# Patient Record
Sex: MALE | Race: WHITE | NOT HISPANIC OR LATINO | Employment: OTHER | URBAN - NONMETROPOLITAN AREA
[De-identification: names, ages, dates, MRNs, and addresses within clinical notes are randomized per-mention and may not be internally consistent; named-entity substitution may affect disease eponyms.]

---

## 2017-09-11 ENCOUNTER — AMBULATORY - GICH (OUTPATIENT)
Dept: ORTHOPEDICS | Facility: OTHER | Age: 67
End: 2017-09-11

## 2017-09-11 DIAGNOSIS — M25.552 PAIN IN LEFT HIP: ICD-10-CM

## 2017-09-11 DIAGNOSIS — M25.551 PAIN IN RIGHT HIP: ICD-10-CM

## 2017-09-12 ENCOUNTER — AMBULATORY - GICH (OUTPATIENT)
Dept: ORTHOPEDICS | Facility: OTHER | Age: 67
End: 2017-09-12

## 2017-09-12 ENCOUNTER — OFFICE VISIT - GICH (OUTPATIENT)
Dept: ORTHOPEDICS | Facility: OTHER | Age: 67
End: 2017-09-12

## 2017-09-12 ENCOUNTER — HOSPITAL ENCOUNTER (OUTPATIENT)
Dept: RADIOLOGY | Facility: OTHER | Age: 67
End: 2017-09-12
Attending: ORTHOPAEDIC SURGERY

## 2017-09-12 ENCOUNTER — HISTORY (OUTPATIENT)
Dept: ORTHOPEDICS | Facility: OTHER | Age: 67
End: 2017-09-12

## 2017-09-12 DIAGNOSIS — M25.552 PAIN IN LEFT HIP: ICD-10-CM

## 2017-09-12 DIAGNOSIS — M25.551 PAIN IN RIGHT HIP: ICD-10-CM

## 2017-09-12 DIAGNOSIS — M70.62 TROCHANTERIC BURSITIS OF LEFT HIP: ICD-10-CM

## 2017-09-12 DIAGNOSIS — M70.61 TROCHANTERIC BURSITIS OF RIGHT HIP: ICD-10-CM

## 2017-09-12 DIAGNOSIS — M16.0 PRIMARY OSTEOARTHRITIS OF BOTH HIPS: ICD-10-CM

## 2017-12-28 NOTE — PROGRESS NOTES
Patient Information     Patient Name MRN Sex Antonio Adamson [id number] Male 1950      Progress Notes by Pascual Faustin DO at 2017 12:45 PM     Author:  Pascual Faustin DO Service:  (none) Author Type:  PHYS- Osteopathic     Filed:  2017  2:58 PM Encounter Date:  2017 Status:  Signed     :  Pascual Faustin DO (PHYS- Osteopathic)            Antonio Rutherford was seen for a chief complaint of bilateral hip pain.    CHIEF COMPLAINT: Antonio Rutherford is a 67 y.o.  male  Chief Complaint     Patient presents with       Consult      Bilateral hip pain       HISTORY OF PRESENTING INJURY:  67-year-old male relates a history of chronic 15+ years of bilateral hip pain. When questioned, the patient describes pain to the outside, lateral aspect of both hips. The right is more painful than the left. No complaint of anterior hip or groin pain. Relates he was seen a couple of years ago and was diagnosed with bursitis of the hip. The patient tried taking Aleve but it cause some problems where he did not feel normal after taking it so he does not want to take Aleve again.  Walks with a limp because of pain on the side of both hips. He often grabs onto objects.  No formal physical therapy.  No prior cortisone injections that he remembers.  Patient used to stand for hours at work as a .    REVIEW OF SYSTEMS:  Constitutional:  Denies constitutional problems  Cardiovascular: normal  Respiratory: normal    The review of systems as documented in the HPI and on the intake questionnaire, completed by the patient 2017, have been reviewed by myself and the pertinent positives and negatives addressed.  The remainder of the complete review of systems was non-contributory.    (PFSH) PAST, FAMILY, and/or SOCIAL HISTORY:    PAST MEDICAL HISTORY:  No past medical history on file.    PAST SURGICAL HISTORY:  No past surgical history on file.    ALLERGIES:  No Active Allergies    CURRENT  "MEDICATIONS:  No current outpatient prescriptions on file.     No current facility-administered medications for this visit.      Medications have been reviewed by me and are current to the best of my knowledge and ability.      FAMILY HISTORY:  Family History       Problem   Relation Age of Onset     Other  Sister      b12 deficiency         Additional PFSH information documented on the intake form completed by the patient 9/12/2017 was reviewed by myself.    PHYSICAL EXAM:   /78  Pulse 72  Ht 1.778 m (5' 10\")  Wt 99.8 kg (220 lb)  BMI 31.57 kg/m2 Body mass index is 31.57 kg/(m^2).    General Appearance: Pleasant male in good appearance, mood and affect.  Alert and orientated times three ( time, date and location).    Musculoskeletal Exam:    Standing, seated, supine exam:  Leg lengths: Equal  Ambulates with a limp: yes  standing exam demonstrates mild weakness of both hip abductors with single leg stand.    bilateral  Hip:  Skin: rashes or lesions: No   Palpation: moderately tender, along the side of both hips over the greater trochanters and trochanteric bursa with palpation. Reproduces symptoms.  No pain with passive motion of the hip joints while seated or supine. No anterior hip or groin pain elicited.  ROM: Passive extension: 0 degrees  Flexion: 90+ degrees  Abduction: 40+ while supine degrees  Adduction: 10-15+ while supine degrees  Internal rotation while seated: 20-30 degrees  External rotation while seated: 40+ degrees  Special maneuvers:         log roll testing painful: no   Strength:   5 / 5  Neurological / Vascular:  sensation grossly intact and distal pulses normal at 2+    XRAY/MRI/MRA:  Radiographic images where independently read by myself and discussed with the patient:  Attending Doctor: JOSE ALVAREZ ([id number])  :       LAKEISHA GREENE ([id number])  Report Date:       09/12/2017 13:58:13  Report Status:       Final  ======================= Begin of Report Content " ======================    PROCEDURE: XR HIP 2 VIEWS WO PELVIS LEFT  HISTORY: Bilateral hip pain.  COMPARISON: None.  TECHNIQUE: 2 views of the left hip were obtained.  FINDINGS: No fracture or dislocation is identified. There are mild to moderate degenerative changes of the left hip joint. There are mild sacroiliac joint degenerative changes.  IMPRESSION: Moderate degenerative disease. No acute fracture.  Electronically Signed By: Lakeisha Greene M.D. on 9/12/2017 1:54 PM    Attending Doctor: JOSE ALVAREZ ([id number])  :       LAKEISHA GREENE ([id number])  Report Date:       09/12/2017 13:57:38  Report Status:       Final  ======================= Begin of Report Content ======================    Procedure: XR HIP 2 OR 3 VIEWS W PELVIS RIGHT  HISTORY: Bilateral hip pain.  TECHNIQUE: Pelvis one view and 2 views right hip.  COMPARISON: None.  FINDINGS:  No acute fracture or dislocation. There are moderate degenerative changes of both hips, right greater than left. There are also mild degenerative changes in the sacroiliac joints bilaterally.  IMPRESSION: Moderate degenerative disease.  Electronically Signed By: Lakeisha Greene M.D. on 9/12/2017 1:53 PM    ASSESSMENT:  Right hip trochanteric tendinitis/bursitis, chronic  left hip trochanteric tendinitis/bursitis, chronic  bilateral hip osteoarthritis on x-ray. Moderate degenerative changes. Joint space remains. No bone-on-bone  symptoms and findings appear to be extra-articular over the side of both hip joints.  Mild bilateral hip abductor weakness    PLAN:  Discussion included review of x-rays. Discussed with the patient he does have arthritis or narrowing of both hip joint spaces on x-ray. He is not bone-on-bone.  Clinically he has good passive motion of both hip joints. Symptoms appear to be extra-articular over the side of the hips.    Recommendation for nonsurgical management and treatment of trochanteric bursitis/tendinitis.    Risks, benefits, conservative,  surgical, and alternatives of treatment were thoroughly outlined. No guarantees were given.   He did verbalize an understanding. All questions and concerns were addressed.    PROCEDURE:  After written and oral informed consent was received from the patient having listed the risks that do include pain, nerve damage, injury to the tendons ,damage to vascular structures and infection but not limited to these; the patients right trochanteric region was sterilely prepped and draped after a timeout was performed. Utilizing ethyl chloride the area was cooled.  With sterile technique a 22 gauge needle was introduced into the trochanteric bursa region and 8 cc of lidocaine and 2 cc of Celestone was injected.  A sterile bandage was applied and the patient tolerated the procedure well.  He was given a handout about injections to take home.     PROCEDURE:  After written and oral informed consent was received from the patient having listed the risks that do include pain, nerve damage, injury to the tendons ,damage to vascular structures and infection but not limited to these; the patients left trochanteric region was sterilely prepped and draped after a timeout was performed. Utilizing ethyl chloride the area was cooled.  With sterile technique a 22 gauge needle was introduced into the trochanteric bursa region and 8 cc of lidocaine and 2 cc of Celestone was injected.  A sterile bandage was applied and the patient tolerated the procedure well.     Recommended physical therapy referral but the patient declined at this point. He will call back if interested.  Questions answered.  Discussed the use of ice to the area.  Discussed the use of Tylenol or ibuprofen. Caution with use.    Pascual Faustin D.O., F.A.O.A.O.  Orthopedic Surgeon    [facility] and [address] [address]  Phone [phone]  Fax [phone]    1:34 PM 9/12/2017

## 2017-12-30 NOTE — NURSING NOTE
Patient Information     Patient Name MRN Sex Antonio Adamson [id number] Male 1950      Nursing Note by Gosselin, Norma J at 2017 12:45 PM     Author:  Gosselin, Norma J  Service:  (none) Author Type:  (none)     Filed:  2017  2:26 PM  Encounter Date:  2017 Status:  Addendum     :  Gosselin, Norma J        Related Notes: Original Note by Gosselin, Norma J filed at 2017 12:45 PM            Consult bilateral hip pain.  Norma J Gosselin LPN....................  2017   12:44 PM    Universal Protocol    A. Pre-procedure verification complete yes  1-relevant information / documentation available, reviewed and properly matched to the patient; 2-consent accurate and complete, 3-equipment and supplies available    B. Site marking complete N/A  Site marked if not in continuous attendance with patient    C. TIME OUT completed yes  Time Out was conducted just prior to starting procedure to verify the eight required elements: 1-patient identity, 2-consent accurate and complete, 3-position, 4-correct side/site marked (if applicable), 5-procedure, 6-relevant images / results properly labeled and displayed (if applicable), 7-antibiotics / irrigation fluids (if applicable), 8-safety precautions.

## 2018-01-26 VITALS
DIASTOLIC BLOOD PRESSURE: 78 MMHG | SYSTOLIC BLOOD PRESSURE: 148 MMHG | WEIGHT: 220 LBS | HEIGHT: 70 IN | HEART RATE: 72 BPM | BODY MASS INDEX: 31.5 KG/M2

## 2018-02-13 ENCOUNTER — DOCUMENTATION ONLY (OUTPATIENT)
Dept: FAMILY MEDICINE | Facility: OTHER | Age: 68
End: 2018-02-13

## 2019-03-05 ENCOUNTER — OFFICE VISIT (OUTPATIENT)
Dept: OTOLARYNGOLOGY | Facility: OTHER | Age: 69
End: 2019-03-05
Attending: OTOLARYNGOLOGY
Payer: MEDICARE

## 2019-03-05 DIAGNOSIS — R13.19 ESOPHAGEAL DYSPHAGIA: Primary | ICD-10-CM

## 2019-03-05 PROCEDURE — G0463 HOSPITAL OUTPT CLINIC VISIT: HCPCS

## 2019-03-08 NOTE — PROGRESS NOTES
ETELVINAALEJANDRA CANALES KIEL    69 Y old Male, : 1950    Account Number: [id number]    [address] [address]    Home: [phone]     Guarantor: ALEJANDRA MAIN Insurance: [facility] MEDICARE B Payer ID: [id number]   PCP: Dr Pascual Faustin   Appointment Facility: [facility]      2019 Progress Notes: Desmond Burnett MD       Reason for Appointment   1. THROAT ISSUES/FOOD GETS STUCK   2. Dysphagia   History of Present Illness   HPI:   The patient is a 69-year-old gentleman who comes to the office today for assistance with dysphagia. He states he has had difficulties for many years but has had substantial progression of his problems over the last year 2. He has gotten to the point were he really can't swallow meets and tougher foods. When he has he has gotten obstructed and feels material stick substernally. He has spent up to a couple of hours before eventually getting material the past or clear from above. He denies hematemesis hemoptysis, odynophagia, otalgia, voice change.   Examination   General Examination:  Oral cavity oropharynx-free of mucosal lesions or inflammation   Indirect laryngoscopy-unremarkable   Neck-no palpable masses or adenopathy   Nasal-no obstruction or purulence   Neck integument-Clear   General-the patient appears well and in no distress   Neuro-there are no focal cranial nerve deficits.     Assessments   1. Esophageal dysphagia - R13.10 (Primary)   Treatment   1. Others   Notes: The patient's symptoms seem substernal and and it would seem reasonable to send him on for upper GI endoscopy. We will make arrangements with Dr. Cancino of General surgery here in [place] for further workup.  Procedures  [ ].          Follow Up   prn               Electronically signed by DESMOND BURNETT MD on 2019 at 08:59 AM CST   Sign off status: Completed          [facility]  [address] [place] [address]  Tel: [phone]  Fax:            Patient: ALJEANDRA MAIN : 1950 Progress Note: Desmond Burnett MD 2019      Note generated by [employer] EMR/PM Software ([email or web address])

## 2019-03-12 ENCOUNTER — OFFICE VISIT (OUTPATIENT)
Dept: SURGERY | Facility: OTHER | Age: 69
End: 2019-03-12
Payer: MEDICARE

## 2019-03-12 ENCOUNTER — TELEPHONE (OUTPATIENT)
Dept: SURGERY | Facility: OTHER | Age: 69
End: 2019-03-12

## 2019-03-12 VITALS
RESPIRATION RATE: 20 BRPM | BODY MASS INDEX: 34.07 KG/M2 | WEIGHT: 230 LBS | TEMPERATURE: 98.3 F | DIASTOLIC BLOOD PRESSURE: 86 MMHG | SYSTOLIC BLOOD PRESSURE: 186 MMHG | HEART RATE: 88 BPM | HEIGHT: 69 IN

## 2019-03-12 DIAGNOSIS — R13.10 DYSPHAGIA, UNSPECIFIED TYPE: Primary | ICD-10-CM

## 2019-03-12 PROCEDURE — 99204 OFFICE O/P NEW MOD 45 MIN: CPT | Performed by: SURGERY

## 2019-03-12 PROCEDURE — G0463 HOSPITAL OUTPT CLINIC VISIT: HCPCS

## 2019-03-12 ASSESSMENT — PAIN SCALES - GENERAL: PAINLEVEL: MILD PAIN (2)

## 2019-03-12 ASSESSMENT — MIFFLIN-ST. JEOR: SCORE: 1788.65

## 2019-03-12 NOTE — H&P (VIEW-ONLY)
Patient Name: Antonio Rutherford  Address: [address]  [facility] [address]  Age:69 year old  Sex: male     Primary Care Physician: none    HPI:   The patient is 69 year old male with complaints of food sticking. The swallowing problems have been present for more than 20 years but are getting worse. He had a XR study more than 10 years ago but hasn't had an EGD. He hasn't had weight loss. The patient denies nausea, vomiting, constipation and diarrhea. He is a smoker of at least a pack, probably 1 1/2 packs a day since he was 4 years old. He reports his sister has had dilation of her esophagus 3 times.     CONSULTATION ASSESSMENT AND PLAN/RECOMMENDATIONS:   I explained to the patient the risks, benefits and alternatives to diagnostic EGD for evaluating his complaint. We specifically discussed the risks of bleeding, infection, perforation, and the risks of sedation. Risk factors include obesity, tobacco use and daily alcohol use. The patient's questions were answered and the patient wished to proceed. This will be scheduled at a time that is convenient for the patient.    REVIEW OF SYSTEMS  GENERAL: No fevers or chills. Denies fatigue, recent weight loss.  HEENT: No sinus drainage. No changes with vision or hearing.  LYMPHATICS:  No swollen nodes in axilla, neck or groin.  CARDIOVASCULAR: Denies chest pain, palpitations and dyspnea on exertion.  PULMONARY: No shortness of breath or cough. No increase in sputum production.  GI: Denies melena, bright red blood in stools. No hematemesis. No constipation or diarrhea.  : No dysuria or hematuria.  SKIN: No recent rashes or ulcers.   HEMATOLOGY:  No history of easy bruising or bleeding.  ENDOCRINE:  No history of diabetes or thyroid problems.  NEUROLOGY:  No history of seizures or headaches. No motor or sensory changes.  Past Medical History:   Diagnosis Date     Tobacco dependence      History reviewed. No pertinent surgical history.  No current outpatient medications on  "file.     No current facility-administered medications for this visit.      Allergies   Allergen Reactions     Naproxen Unknown     Family History   Problem Relation Age of Onset     Other - See Comments Sister         b12 deficiency     Social History     Socioeconomic History     Marital status:      Spouse name: None     Number of children: None     Years of education: None     Highest education level: None   Occupational History     None   Social Needs     Financial resource strain: None     Food insecurity:     Worry: None     Inability: None     Transportation needs:     Medical: None     Non-medical: None   Tobacco Use     Smoking status: Current Every Day Smoker     Packs/day: 1.50     Years: 62.00     Pack years: 93.00     Types: Cigarettes     Smokeless tobacco: Never Used     Tobacco comment: has cut back his smoking   Substance and Sexual Activity     Alcohol use: Yes     Comment: Alcoholic Drinks/day: \"way too much\" 1 L per week     Drug use: Unknown     Types: Other     Comment: Drug use: Not Asked     Sexual activity: None   Lifestyle     Physical activity:     Days per week: None     Minutes per session: None     Stress: None   Relationships     Social connections:     Talks on phone: None     Gets together: None     Attends Evangelical service: None     Active member of club or organization: None     Attends meetings of clubs or organizations: None     Relationship status: None     Intimate partner violence:     Fear of current or ex partner: None     Emotionally abused: None     Physically abused: None     Forced sexual activity: None   Other Topics Concern     None   Social History Narrative    Has worked as a , now retired.      The above history was reviewed today 3/12/2019    PHYSICAL EXAM  Vitals: /86 (BP Location: Right arm, Patient Position: Sitting, Cuff Size: Adult Large)   Pulse 88   Temp 98.3  F (36.8  C) (Tympanic)   Resp 20   Ht 1.753 m (5' 9\")   Wt " 104.3 kg (230 lb)   BMI 33.97 kg/m    GENERAL: patient in no acute distress. Pleasant and cooperative with exam and interview.   HEENT:Head-normocephalic. Eyes-no scleral icterus. Nose-no nasal drainage. No lesions. Mouth-oral mucosa pink and moist, no lesions.  NECK: Supple. No thyroid nodules. Trachea midline.  LYMPHATICS:  No cervical, axillary or supraclavicular adenopathy.  CV: Regular rate and rhythm, no murmurs. No peripheral edema.  LUNGS:  No respiratory distress. Scattered wheezes bilaterally to auscultation.  ABDOMEN: Non distended. Bowel sounds active. Soft, non-tender, no hepatosplenomegaly or umbilical hernia. No peritoneal signs.  SKIN: Pink, warm and dry. No jaundice. No rash.  NEURO:  Cranial nerves II-XII grossly intact. Alert and oriented.  PSYCH: Appropriate mood and affect.

## 2019-03-12 NOTE — TELEPHONE ENCOUNTER
Screening Questions for the Scheduling of Screening Colonoscopies   (If Colonoscopy is diagnostic, Provider should review the chart before scheduling.)  Are you younger than 50 or older than 80?  NO   Do you take aspirin or fish oil?  NO  (if yes, tell patient to stop 1 week prior to Colonoscopy)  Do you take warfarin (Coumadin), clopidogrel (Plavix), apixaban (Eliquis), dabigatram (Pradaxa), rivaroxaban (Xarelto) or any blood thinner? NO   Do you use oxygen at home?  NO   Do you have kidney disease? NO   Are you on dialysis? NO   Have you had a stroke or heart attack in the last year? NO   Have you had a stent in your heart or any blood vessel in the last year? NO   Have you had a transplant of any organ? NO   Have you had a colonoscopy or upper endoscopy (EGD) before? NO          When?    Date of scheduled EGD  - 03/20/2019  Provider  [place]

## 2019-03-12 NOTE — PROGRESS NOTES
Patient Name: Antonio Rutherford  Address: [address]  [facility] [address]  Age:69 year old  Sex: male     Primary Care Physician: none    HPI:   The patient is 69 year old male with complaints of food sticking. The swallowing problems have been present for more than 20 years but are getting worse. He had a XR study more than 10 years ago but hasn't had an EGD. He hasn't had weight loss. The patient denies nausea, vomiting, constipation and diarrhea. He is a smoker of at least a pack, probably 1 1/2 packs a day since he was 4 years old. He reports his sister has had dilation of her esophagus 3 times.     CONSULTATION ASSESSMENT AND PLAN/RECOMMENDATIONS:   I explained to the patient the risks, benefits and alternatives to diagnostic EGD for evaluating his complaint. We specifically discussed the risks of bleeding, infection, perforation, and the risks of sedation. Risk factors include obesity, tobacco use and daily alcohol use. The patient's questions were answered and the patient wished to proceed. This will be scheduled at a time that is convenient for the patient.    REVIEW OF SYSTEMS  GENERAL: No fevers or chills. Denies fatigue, recent weight loss.  HEENT: No sinus drainage. No changes with vision or hearing.  LYMPHATICS:  No swollen nodes in axilla, neck or groin.  CARDIOVASCULAR: Denies chest pain, palpitations and dyspnea on exertion.  PULMONARY: No shortness of breath or cough. No increase in sputum production.  GI: Denies melena, bright red blood in stools. No hematemesis. No constipation or diarrhea.  : No dysuria or hematuria.  SKIN: No recent rashes or ulcers.   HEMATOLOGY:  No history of easy bruising or bleeding.  ENDOCRINE:  No history of diabetes or thyroid problems.  NEUROLOGY:  No history of seizures or headaches. No motor or sensory changes.  Past Medical History:   Diagnosis Date     Tobacco dependence      History reviewed. No pertinent surgical history.  No current outpatient medications on  "file.     No current facility-administered medications for this visit.      Allergies   Allergen Reactions     Naproxen Unknown     Family History   Problem Relation Age of Onset     Other - See Comments Sister         b12 deficiency     Social History     Socioeconomic History     Marital status:      Spouse name: None     Number of children: None     Years of education: None     Highest education level: None   Occupational History     None   Social Needs     Financial resource strain: None     Food insecurity:     Worry: None     Inability: None     Transportation needs:     Medical: None     Non-medical: None   Tobacco Use     Smoking status: Current Every Day Smoker     Packs/day: 1.50     Years: 62.00     Pack years: 93.00     Types: Cigarettes     Smokeless tobacco: Never Used     Tobacco comment: has cut back his smoking   Substance and Sexual Activity     Alcohol use: Yes     Comment: Alcoholic Drinks/day: \"way too much\" 1 L per week     Drug use: Unknown     Types: Other     Comment: Drug use: Not Asked     Sexual activity: None   Lifestyle     Physical activity:     Days per week: None     Minutes per session: None     Stress: None   Relationships     Social connections:     Talks on phone: None     Gets together: None     Attends Restorationism service: None     Active member of club or organization: None     Attends meetings of clubs or organizations: None     Relationship status: None     Intimate partner violence:     Fear of current or ex partner: None     Emotionally abused: None     Physically abused: None     Forced sexual activity: None   Other Topics Concern     None   Social History Narrative    Has worked as a , now retired.      The above history was reviewed today 3/12/2019    PHYSICAL EXAM  Vitals: /86 (BP Location: Right arm, Patient Position: Sitting, Cuff Size: Adult Large)   Pulse 88   Temp 98.3  F (36.8  C) (Tympanic)   Resp 20   Ht 1.753 m (5' 9\")   Wt " 104.3 kg (230 lb)   BMI 33.97 kg/m    GENERAL: patient in no acute distress. Pleasant and cooperative with exam and interview.   HEENT:Head-normocephalic. Eyes-no scleral icterus. Nose-no nasal drainage. No lesions. Mouth-oral mucosa pink and moist, no lesions.  NECK: Supple. No thyroid nodules. Trachea midline.  LYMPHATICS:  No cervical, axillary or supraclavicular adenopathy.  CV: Regular rate and rhythm, no murmurs. No peripheral edema.  LUNGS:  No respiratory distress. Scattered wheezes bilaterally to auscultation.  ABDOMEN: Non distended. Bowel sounds active. Soft, non-tender, no hepatosplenomegaly or umbilical hernia. No peritoneal signs.  SKIN: Pink, warm and dry. No jaundice. No rash.  NEURO:  Cranial nerves II-XII grossly intact. Alert and oriented.  PSYCH: Appropriate mood and affect.

## 2019-03-12 NOTE — NURSING NOTE
"Chief Complaint   Patient presents with     Consult     Esophageal dysphagia       Initial /86 (BP Location: Right arm, Patient Position: Sitting, Cuff Size: Adult Large)   Pulse 88   Temp 98.3  F (36.8  C) (Tympanic)   Resp 20   Ht 1.753 m (5' 9\")   Wt 104.3 kg (230 lb)   BMI 33.97 kg/m   Estimated body mass index is 33.97 kg/m  as calculated from the following:    Height as of this encounter: 1.753 m (5' 9\").    Weight as of this encounter: 104.3 kg (230 lb).  Medication Reconciliation: complete    Antonieta Salvador LPN    "

## 2019-03-20 ENCOUNTER — HOSPITAL ENCOUNTER (OUTPATIENT)
Facility: OTHER | Age: 69
Discharge: HOME | End: 2019-03-20
Attending: SURGERY | Admitting: SURGERY
Payer: MEDICARE

## 2019-03-20 ENCOUNTER — ANESTHESIA EVENT (OUTPATIENT)
Dept: SURGERY | Facility: OTHER | Age: 69
End: 2019-03-20
Payer: MEDICARE

## 2019-03-20 ENCOUNTER — ANESTHESIA (OUTPATIENT)
Dept: SURGERY | Facility: OTHER | Age: 69
End: 2019-03-20
Payer: MEDICARE

## 2019-03-20 VITALS
TEMPERATURE: 98.3 F | SYSTOLIC BLOOD PRESSURE: 186 MMHG | HEART RATE: 64 BPM | RESPIRATION RATE: 18 BRPM | OXYGEN SATURATION: 96 % | HEIGHT: 69 IN | DIASTOLIC BLOOD PRESSURE: 109 MMHG | WEIGHT: 230 LBS | BODY MASS INDEX: 34.07 KG/M2

## 2019-03-20 DIAGNOSIS — K29.80 DUODENITIS: ICD-10-CM

## 2019-03-20 DIAGNOSIS — K22.2 ESOPHAGEAL STRICTURE: Primary | ICD-10-CM

## 2019-03-20 DIAGNOSIS — K29.70 GASTRITIS DETERMINED BY ENDOSCOPY: ICD-10-CM

## 2019-03-20 PROCEDURE — 88305 TISSUE EXAM BY PATHOLOGIST: CPT

## 2019-03-20 PROCEDURE — 43239 EGD BIOPSY SINGLE/MULTIPLE: CPT | Mod: XU | Performed by: SURGERY

## 2019-03-20 PROCEDURE — 25800030 ZZH RX IP 258 OP 636: Performed by: SURGERY

## 2019-03-20 PROCEDURE — 43249 ESOPH EGD DILATION <30 MM: CPT | Performed by: SURGERY

## 2019-03-20 PROCEDURE — 25000125 ZZHC RX 250: Performed by: SURGERY

## 2019-03-20 PROCEDURE — 99100 ANES PT EXTEME AGE<1 YR&>70: CPT | Performed by: NURSE ANESTHETIST, CERTIFIED REGISTERED

## 2019-03-20 PROCEDURE — 25000128 H RX IP 250 OP 636: Performed by: NURSE ANESTHETIST, CERTIFIED REGISTERED

## 2019-03-20 PROCEDURE — 40000010 ZZH STATISTIC ANES STAT CODE-CRNA PER MINUTE: Performed by: SURGERY

## 2019-03-20 PROCEDURE — 25000125 ZZHC RX 250: Performed by: NURSE ANESTHETIST, CERTIFIED REGISTERED

## 2019-03-20 PROCEDURE — 43239 EGD BIOPSY SINGLE/MULTIPLE: CPT | Performed by: NURSE ANESTHETIST, CERTIFIED REGISTERED

## 2019-03-20 PROCEDURE — 43249 ESOPH EGD DILATION <30 MM: CPT

## 2019-03-20 RX ORDER — SODIUM CHLORIDE, SODIUM LACTATE, POTASSIUM CHLORIDE, CALCIUM CHLORIDE 600; 310; 30; 20 MG/100ML; MG/100ML; MG/100ML; MG/100ML
INJECTION, SOLUTION INTRAVENOUS CONTINUOUS
Status: DISCONTINUED | OUTPATIENT
Start: 2019-03-20 | End: 2019-03-20 | Stop reason: HOSPADM

## 2019-03-20 RX ORDER — LIDOCAINE 40 MG/G
CREAM TOPICAL
Status: DISCONTINUED | OUTPATIENT
Start: 2019-03-20 | End: 2019-03-20 | Stop reason: HOSPADM

## 2019-03-20 RX ORDER — PROPOFOL 10 MG/ML
INJECTION, EMULSION INTRAVENOUS CONTINUOUS PRN
Status: DISCONTINUED | OUTPATIENT
Start: 2019-03-20 | End: 2019-03-20

## 2019-03-20 RX ORDER — ONDANSETRON 4 MG/1
4 TABLET, ORALLY DISINTEGRATING ORAL EVERY 6 HOURS PRN
Status: CANCELLED | OUTPATIENT
Start: 2019-03-20

## 2019-03-20 RX ORDER — ONDANSETRON 2 MG/ML
4 INJECTION INTRAMUSCULAR; INTRAVENOUS
Status: DISCONTINUED | OUTPATIENT
Start: 2019-03-20 | End: 2019-03-20 | Stop reason: HOSPADM

## 2019-03-20 RX ORDER — SUCRALFATE 1 G/1
1 TABLET ORAL 3 TIMES DAILY
Qty: 90 TABLET | Refills: 3 | Status: SHIPPED | OUTPATIENT
Start: 2019-03-20 | End: 2019-04-18

## 2019-03-20 RX ORDER — FLUMAZENIL 0.1 MG/ML
0.2 INJECTION, SOLUTION INTRAVENOUS
Status: CANCELLED | OUTPATIENT
Start: 2019-03-20 | End: 2019-03-20

## 2019-03-20 RX ORDER — ONDANSETRON 2 MG/ML
4 INJECTION INTRAMUSCULAR; INTRAVENOUS EVERY 6 HOURS PRN
Status: CANCELLED | OUTPATIENT
Start: 2019-03-20

## 2019-03-20 RX ORDER — NALOXONE HYDROCHLORIDE 0.4 MG/ML
.1-.4 INJECTION, SOLUTION INTRAMUSCULAR; INTRAVENOUS; SUBCUTANEOUS
Status: CANCELLED | OUTPATIENT
Start: 2019-03-20 | End: 2019-03-21

## 2019-03-20 RX ORDER — LIDOCAINE HYDROCHLORIDE 20 MG/ML
INJECTION, SOLUTION INFILTRATION; PERINEURAL PRN
Status: DISCONTINUED | OUTPATIENT
Start: 2019-03-20 | End: 2019-03-20

## 2019-03-20 RX ORDER — PROPOFOL 10 MG/ML
INJECTION, EMULSION INTRAVENOUS PRN
Status: DISCONTINUED | OUTPATIENT
Start: 2019-03-20 | End: 2019-03-20

## 2019-03-20 RX ADMIN — LIDOCAINE HYDROCHLORIDE 40 MG: 20 INJECTION, SOLUTION INFILTRATION; PERINEURAL at 09:52

## 2019-03-20 RX ADMIN — PROPOFOL 80 MG: 10 INJECTION, EMULSION INTRAVENOUS at 09:52

## 2019-03-20 RX ADMIN — SODIUM CHLORIDE, POTASSIUM CHLORIDE, SODIUM LACTATE AND CALCIUM CHLORIDE: 600; 310; 30; 20 INJECTION, SOLUTION INTRAVENOUS at 09:46

## 2019-03-20 RX ADMIN — PROPOFOL 160 MCG/KG/MIN: 10 INJECTION, EMULSION INTRAVENOUS at 09:52

## 2019-03-20 ASSESSMENT — MIFFLIN-ST. JEOR: SCORE: 1788.65

## 2019-03-20 ASSESSMENT — LIFESTYLE VARIABLES: TOBACCO_USE: 1

## 2019-03-20 NOTE — DISCHARGE INSTRUCTIONS
Procedure you had done: EGD with biopsies and dilation  Your health care provider is:  Pascual Faustin  Your surgeon is Dr. Izzy Cancino.   Please call your health care provider or surgeon at [phone] if:    - you feel you are getting worse or having an increase in problems    - fever greater than 101 degrees  - increasing shortness of breath or chest pain  - any signs of infection (increasing redness, swelling, tenderness, warmth, change in appearance, or  increased drainage)  - blood in your urine or stool  - coughing or vomiting blood  - nausea (upset stomach) and vomiting and/or diarrhea that will not stop  - severe pain that is not relieved by medicine, rest or ice  You have had medications for sedation. Please be aware that this can cause drowsiness and impaired judgment for up to 24 hours after your procedure. Do not drive, operate power tools or drink alcohol for 24 hours.  If samples were taken-you will get a phone call and a letter with your results in the next 7-10 days. If you don't get results, please call and let us know!       [facility]  Adult Discharge Orders & Instructions    ________________________________________________________________          For 12 hours after surgery  1. Get plenty of rest.  A responsible adult must stay with you for at least 24 hours after you leave the hospital.   2. You may feel lightheaded.  IF so, sit for a few minutes before standing.  Have someone help you get up.   3. You may have a slight fever. Call the doctor if your fever is over 101 F (38.3 C) (taken under the tongue) or lasts longer than 24 hours.  4. You may have a dry mouth, a sore throat, muscle aches or trouble sleeping.  These should go away after 24 hours.  5. Do not make important or legal decisions.      To contact a doctor, call   [phone]_______________________

## 2019-03-20 NOTE — ANESTHESIA CARE TRANSFER NOTE
Patient: Antonio Rutherford    Procedure(s):  ESOPHAGOSCOPY, GASTROSCOPY, DUODENOSCOPY (EGD), Biuopsies and Dilation of Esophagus    Diagnosis: dysphagia  Diagnosis Additional Information: No value filed.    Anesthesia Type:   MAC     Note:  Airway :Nasal Cannula  Patient transferred to:Phase II  Handoff Report: Identifed the Patient, Identified the Reponsible Provider, Reviewed the pertinent medical history, Discussed the surgical course, Reviewed Intra-OP anesthesia mangement and issues during anesthesia, Set expectations for post-procedure period and Allowed opportunity for questions and acknowledgement of understanding      Vitals: (Last set prior to Anesthesia Care Transfer)    CRNA VITALS  3/20/2019 0947 - 3/20/2019 1018      3/20/2019             Pulse:  85    Ht Rate:  85    SpO2:  91 %    Resp Rate (set):  10                Electronically Signed By: JAKUB GIBSON CRNA  March 20, 2019  10:18 AM

## 2019-03-20 NOTE — ANESTHESIA PREPROCEDURE EVALUATION
Anesthesia Pre-Procedure Evaluation    Patient: Antonio Rutherford   MRN: [id number] : 1950          Preoperative Diagnosis: dysphagia    Procedure(s):  ESOPHAGOSCOPY, GASTROSCOPY, DUODENOSCOPY (EGD)    Past Medical History:   Diagnosis Date     Alcohol abuse      Tobacco dependence      Past Surgical History:   Procedure Laterality Date     NO HISTORY OF SURGERY         Anesthesia Evaluation     .             ROS/MED HX    ENT/Pulmonary:     (+)tobacco use, Current use 1.5 packs/day  , . .    Neurologic:  - neg neurologic ROS     Cardiovascular:     (+) Dyslipidemia, ----. : . . . :. .       METS/Exercise Tolerance:     Hematologic:  - neg hematologic  ROS       Musculoskeletal:   (+) arthritis, , , -       GI/Hepatic: Comment: Dysphagia, heavy alcohol use    (+) GERD       Renal/Genitourinary:  - ROS Renal section negative       Endo:     (+) Obesity, .      Psychiatric:  - neg psychiatric ROS       Infectious Disease:  - neg infectious disease ROS       Malignancy:      - no malignancy   Other:    - neg other ROS                      Physical Exam  Normal systems: cardiovascular    Airway   Mallampati: II  TM distance: >3 FB  Neck ROM: full    Dental   (+) missing  Comment: Missing upper     Cardiovascular   Rhythm and rate: regular and normal      Pulmonary (+) decreased breath sounds               Lab Results   Component Value Date    HGB 15.5 2015    HCT 45.3 2015     2015     2015    POTASSIUM 4.1 2015    CHLORIDE 103 2015    CO2 27 2015    BUN 18 2015    CR 0.80 2015    GLC 86 2015    PAWAN 9.8 2015    ALBUMIN 4.4 2015    PROTTOTAL 7.4 2015    ALKPHOS 53 2015    BILITOTAL 0.5 2015       Preop Vitals  BP Readings from Last 3 Encounters:   19 186/86   17 148/78   06/18/15 (!) 172/96    Pulse Readings from Last 3 Encounters:   19 88   17 72   06/16/15 76      Resp Readings from Last 3  "Encounters:   03/12/19 20    SpO2 Readings from Last 3 Encounters:   No data found for SpO2      Temp Readings from Last 1 Encounters:   03/12/19 98.3  F (36.8  C) (Tympanic)    Ht Readings from Last 1 Encounters:   03/12/19 1.753 m (5' 9\")      Wt Readings from Last 1 Encounters:   03/12/19 104.3 kg (230 lb)    Estimated body mass index is 33.97 kg/m  as calculated from the following:    Height as of 3/12/19: 1.753 m (5' 9\").    Weight as of 3/12/19: 104.3 kg (230 lb).       Anesthesia Plan      History & Physical Review      ASA Status:  2 .    NPO Status:  > 8 hours    Plan for MAC          Postoperative Care      Consents  Anesthetic plan, risks, benefits and alternatives discussed with:  Patient..                 JAKUB Chacon CRNA  "

## 2019-03-20 NOTE — INTERVAL H&P NOTE
The history and physical has been reviewed and the patient has been examined.  There are no changes to the patient's history or physical condition.  I discussed diagnostic EGD with the patient. Anesthesia coverage requested due to age more than 68.

## 2019-03-20 NOTE — ANESTHESIA POSTPROCEDURE EVALUATION
Patient: Antonio Rutherford    Procedure(s):  ESOPHAGOSCOPY, GASTROSCOPY, DUODENOSCOPY (EGD), Biuopsies and Dilation of Esophagus    Diagnosis:dysphagia  Diagnosis Additional Information: No value filed.    Anesthesia Type:  MAC    Note:  Anesthesia Post Evaluation    Patient location during evaluation: Phase 2  Patient participation: Able to fully participate in evaluation  Level of consciousness: awake and alert  Pain management: adequate  Airway patency: patent  Cardiovascular status: acceptable  Respiratory status: acceptable  Hydration status: acceptable  PONV: none             Last vitals:  Vitals:    03/20/19 1022 03/20/19 1030 03/20/19 1045   BP: (!) 176/99 (!) 172/106    Pulse: 75 72    Resp: 18     Temp: 98.3  F (36.8  C)     SpO2: 96% 98% 96%         Electronically Signed By: JAKUB GIBSON CRNA  March 20, 2019  11:17 AM

## 2019-03-20 NOTE — OP NOTE
PROCEDURE NOTE    SURGEON: Izzy Cancino MD.    PRE-OP DIAGNOSIS:   dysphagia      POST-OP DIAGNOSIS: esophageal stricture, gastritis, duodenitis    PROCEDURE PLANNED:   Diagnostic EGD, flexible        PROCEDURE PERFORMED:  EGD with biopsy, flexible, esophageal dilation with balloon to 17 (4.5 Kyler)  SPECIMEN:  Duodenum, antrum biopsies    ANESTHESIA: See anesthesia record,  Coverage requested due to: Age more than 68    ESTIMATED BLOOD LOSS: None    COMPLICATIONS:  None    INDICATION FORTHE PROCEDURE: The patient is a 69 year oldmale. The patient presents with dysphagia. I explained to the patient the risks, benefits and alternatives to diagnostic EGD for evaluating his compl. We specifically discussed the risks of bleeding, infection, perforation and the risks of sedation. The patient's questions were answered and the patient wished to proceed. Informed consent paperwork was completed.    PROCEDURE: The patient was taken to the endoscopy suite. Appropriate monitors were attached. The patient was placed in the left lateral decubitus position. Bite block was positioned.Timeout was performed confirming the patient's identity and procedure to be performed. After appropriate sedation was confirmed, the flexible endoscope was advanced into the oropharynx. The posterior oropharynx appeared grossly normal. The scope was advanced into the proximal esophagus. The esophagus was insufflated with air. The scope was advanced under direct visualization. A distal esophageal stricture was noted. The scope was able to be advanced into the stomach. Gastritis was ntoed. The scope was advanced through the pylorus into the duodenal bulb. The bulb and distal duodenum appeared inflamed. Biopsies were taken. The scope was withdrawn back into the stomach. Antral biopsy was obtained and sent to pathology. The scope was retroflexed and the GE junction inspected. No abnormalities were noted. The scope was returned to a neutral position and  the stomach was decompressed. The scope was withdrawn to the GE junction. The stricture was dilated with a balloon to 17 for 20 seconds-two times. The mucosa of the esophagus was inspected while withdrawing the scope. No abnormalities were noted. The scope was withdrawn from the patient. The bite block was removed. The patient tolerated the procedure with no immediately apparent complication. The patient was taken to recovery in stable condition.    FOLLOW UP:  RECOMMENDATIONS Will call with pathology results. Start carafate liquid three times a day.

## 2019-03-26 DIAGNOSIS — A04.8 H. PYLORI INFECTION: Primary | ICD-10-CM

## 2019-03-26 RX ORDER — CLARITHROMYCIN 500 MG
500 TABLET ORAL 2 TIMES DAILY
Qty: 28 TABLET | Refills: 0 | Status: SHIPPED | OUTPATIENT
Start: 2019-03-26 | End: 2019-04-18

## 2019-03-26 RX ORDER — AMOXICILLIN 500 MG/1
1000 CAPSULE ORAL 2 TIMES DAILY
Qty: 56 CAPSULE | Refills: 0 | Status: SHIPPED | OUTPATIENT
Start: 2019-03-26 | End: 2019-04-18

## 2019-03-27 ENCOUNTER — TELEPHONE (OUTPATIENT)
Dept: SURGERY | Facility: OTHER | Age: 69
End: 2019-03-27

## 2019-04-18 ENCOUNTER — OFFICE VISIT (OUTPATIENT)
Dept: SURGERY | Facility: OTHER | Age: 69
End: 2019-04-18
Attending: SURGERY
Payer: MEDICARE

## 2019-04-18 VITALS
RESPIRATION RATE: 18 BRPM | HEIGHT: 69 IN | TEMPERATURE: 98.5 F | DIASTOLIC BLOOD PRESSURE: 108 MMHG | WEIGHT: 231 LBS | BODY MASS INDEX: 34.21 KG/M2 | SYSTOLIC BLOOD PRESSURE: 162 MMHG | HEART RATE: 72 BPM

## 2019-04-18 DIAGNOSIS — K22.2 ESOPHAGEAL STRICTURE: Primary | ICD-10-CM

## 2019-04-18 DIAGNOSIS — K29.70 GASTRITIS DETERMINED BY ENDOSCOPY: ICD-10-CM

## 2019-04-18 DIAGNOSIS — A04.8 H. PYLORI INFECTION: ICD-10-CM

## 2019-04-18 PROCEDURE — 99213 OFFICE O/P EST LOW 20 MIN: CPT | Performed by: SURGERY

## 2019-04-18 PROCEDURE — G0463 HOSPITAL OUTPT CLINIC VISIT: HCPCS

## 2019-04-18 RX ORDER — SUCRALFATE 1 G/1
1 TABLET ORAL 3 TIMES DAILY PRN
Qty: 90 TABLET | Refills: 3 | Status: SHIPPED | OUTPATIENT
Start: 2019-04-18 | End: 2019-05-29

## 2019-04-18 ASSESSMENT — PAIN SCALES - GENERAL: PAINLEVEL: NO PAIN (0)

## 2019-04-18 ASSESSMENT — MIFFLIN-ST. JEOR: SCORE: 1793.19

## 2019-04-18 NOTE — NURSING NOTE
"Chief Complaint   Patient presents with     RECHECK     3 week follow up dysphagia / H Pylori       Initial BP (!) 172/104 (BP Location: Right arm, Patient Position: Sitting, Cuff Size: Adult Large)   Pulse 72   Temp 98.5  F (36.9  C) (Tympanic)   Resp 18   Ht 1.753 m (5' 9\")   Wt 104.8 kg (231 lb)   BMI 34.11 kg/m   Estimated body mass index is 34.11 kg/m  as calculated from the following:    Height as of this encounter: 1.753 m (5' 9\").    Weight as of this encounter: 104.8 kg (231 lb).  Medication Reconciliation: complete    Antonieta Salvador LPN    "

## 2019-04-18 NOTE — PROGRESS NOTES
Primary Care Physician: Physician No Ref-Primary      HPI:   Patient is here for follow up. The patient is 69 year old male with previous dysphagia. He had EGD that showed lower esophageal stricture and H.pylori infection. He has been doing well with swallowing. One episode of some dry chicken that was a little sticky for a couple of seconds but went right down with water. He has stopped the amoxicillin as he thought it was making his vitamin b 12 low and making him tired. He only had 2 days left. He hasn't had any dark stools or bloody stools. No nausea or reflux problems. He doesn't have a primary care provider.      ASSESSMENT AND PLAN/RECOMMENDATIONS:   H.pylori infection-treated  esophageal stricture-use the carafate 2-3 times a day for any heart burn. Patient reluctant to take any medication routinely. Encouraged limiting or eliminating alcohol use.  Set up appointment with Dr. Rivero to establish primary care as his blood pressure continues to be elevated. tp in agreement.     Past Medical History:   Diagnosis Date     Alcohol abuse      Tobacco dependence       Past Surgical History:   Procedure Laterality Date     ESOPHAGOSCOPY, GASTROSCOPY, DUODENOSCOPY (EGD), COMBINED N/A 3/20/2019    Procedure: ESOPHAGOSCOPY, GASTROSCOPY, DUODENOSCOPY (EGD), Biuopsies and Dilation of Esophagus;  Surgeon: Izzy Cancino MD;  Location:  OR     NO HISTORY OF SURGERY       Family History   Problem Relation Age of Onset     Other - See Comments Sister         b12 deficiency     Dysphagia Sister      Social History     Socioeconomic History     Marital status:      Spouse name: None     Number of children: None     Years of education: None     Highest education level: None   Occupational History     None   Social Needs     Financial resource strain: None     Food insecurity:     Worry: None     Inability: None     Transportation needs:     Medical: None     Non-medical: None   Tobacco Use     Smoking status: Current  "Every Day Smoker     Packs/day: 1.50     Years: 62.00     Pack years: 93.00     Types: Cigarettes     Smokeless tobacco: Never Used     Tobacco comment: has cut back his smoking   Substance and Sexual Activity     Alcohol use: Yes     Comment: Alcoholic Drinks/day: \"way too much\" 1 L per week     Drug use: Unknown     Types: Other     Comment: Drug use: Not Asked     Sexual activity: None   Lifestyle     Physical activity:     Days per week: None     Minutes per session: None     Stress: None   Relationships     Social connections:     Talks on phone: None     Gets together: None     Attends Restorationism service: None     Active member of club or organization: None     Attends meetings of clubs or organizations: None     Relationship status: None     Intimate partner violence:     Fear of current or ex partner: None     Emotionally abused: None     Physically abused: None     Forced sexual activity: None   Other Topics Concern     None   Social History Narrative    Has worked as a , now retired.     Current Outpatient Medications   Medication     clarithromycin (BIAXIN) 500 MG tablet     sucralfate (CARAFATE) 1 GM tablet     No current facility-administered medications for this visit.      Allergies   Allergen Reactions     Naproxen Unknown     Passed out with aleve     REVIEW OF SYSTEMS  GENERAL: No fevers or chills. Has fatigue, no recent weight loss.  HEENT: No sinus drainage. No changes with vision or hearing. No difficulty swallowing.   LYMPHATICS:  No swollen nodes in axilla, neck or groin.  CARDIOVASCULAR: Denies chest pain, palpitations and dyspnea on exertion.  PULMONARY: No shortness of breath or cough. No increase in sputum production.  GI: Denies melena, bright red blood in stools. No hematemesis. No constipation or diarrhea.  : No dysuria or hematuria.  SKIN: No recent rashes or ulcers.   HEMATOLOGY:  No history of easy bruising or bleeding.  ENDOCRINE:  No history of diabetes or thyroid " "problems.  NEUROLOGY:  No history of seizures or headaches. No motor or sensory changes.    PHYSICAL EXAM  Vitals: BP (!) 172/104 (BP Location: Right arm, Patient Position: Sitting, Cuff Size: Adult Large)   Pulse 72   Temp 98.5  F (36.9  C) (Tympanic)   Resp 18   Ht 1.753 m (5' 9\")   Wt 104.8 kg (231 lb)   BMI 34.11 kg/m    GENERAL: patient in no acute distress. Pleasant and cooperative with exam and interview.   HEENT:Head-normocephalic. Eyes-no scleral icterus, pupils equal, round, and reactive to light. Nose-no nasal drainage. No lesions. Mouth-oral mucosa pink and moist, no lesions.  NECK: Supple. No thyroid nodules. Trachea midline.  LYMPHATICS:  No cervical, axillary or supraclavicular adenopathy.  ABDOMEN: Non distended. Bowel sounds active. Soft, non-tender, no hepatosplenomegaly. No peritoneal signs.  SKIN: Pink, warm and dry. No jaundice. No rash.  NEURO:  Cranial nerves II-XII grossly intact. Alert and oriented.  PSYCH: Appropriate mood and affect.    "

## 2019-04-18 NOTE — PATIENT INSTRUCTIONS
Establish care with Dr. Rivero, take vitamin B12 supplement. Can take the coating (sulcrafrate) if you have discomfort. Call if you have swallowing problems-we can do another scope.

## 2019-05-22 ENCOUNTER — TELEPHONE (OUTPATIENT)
Dept: SURGERY | Facility: OTHER | Age: 69
End: 2019-05-22

## 2019-05-22 NOTE — TELEPHONE ENCOUNTER
Patient called stating Dr. Cancino performed surgery on his esophagus 3/20/19. He has been having problems with swallowing, food getting stuck. Please advise.    Alice Shen on 5/22/2019 at 3:45 PM

## 2019-05-23 NOTE — TELEPHONE ENCOUNTER
Ok to schedule for an EGD please let Josephine know. Thanks! Izzy Cancino MD on 5/23/2019 at 12:17 PM

## 2019-05-29 ENCOUNTER — TELEPHONE (OUTPATIENT)
Dept: SURGERY | Facility: OTHER | Age: 69
End: 2019-05-29

## 2019-05-29 NOTE — TELEPHONE ENCOUNTER
Josephine will get him scheduled for the EGD per Dr. Cancino note.  Antonieta Salvador LPN .......5/29/2019 10:07 AM

## 2019-05-29 NOTE — TELEPHONE ENCOUNTER
Screening Questions for the Scheduling of Screening Colonoscopies   (If Colonoscopy is diagnostic, Provider should review the chart before scheduling.)  Are you younger than 50 or older than 80?  YES   Do you take aspirin or fish oil?  NO (if yes, tell patient to stop 1 week prior to Colonoscopy)  Do you take warfarin (Coumadin), clopidogrel (Plavix), apixaban (Eliquis), dabigatram (Pradaxa), rivaroxaban (Xarelto) or any blood thinner? NO   Do you use oxygen at home?  NO   Do you have kidney disease? NO   Are you on dialysis? NO   Have you had a stroke or heart attack in the last year? NO   Have you had a stent in your heart or any blood vessel in the last year? NO   Have you had a transplant of any organ? NO   Have you had a colonoscopy or upper endoscopy (EGD) before? YES         When?  03/2019  Date of scheduled EGD 06/05/20019  Provider PRADHAN   Pharmacy

## 2019-05-29 NOTE — TELEPHONE ENCOUNTER
Patient called today wanting to schedule what is next since his symptoms have come back.    Please call patient.  Thank You Chante Thomas on 5/29/2019 at 9:25 AM

## 2019-06-05 ENCOUNTER — ANESTHESIA (OUTPATIENT)
Dept: SURGERY | Facility: OTHER | Age: 69
End: 2019-06-05
Payer: MEDICARE

## 2019-06-05 ENCOUNTER — ANESTHESIA EVENT (OUTPATIENT)
Dept: SURGERY | Facility: OTHER | Age: 69
End: 2019-06-05
Payer: MEDICARE

## 2019-06-05 ENCOUNTER — HOSPITAL ENCOUNTER (OUTPATIENT)
Facility: OTHER | Age: 69
Discharge: HOME | End: 2019-06-05
Attending: SURGERY | Admitting: SURGERY
Payer: MEDICARE

## 2019-06-05 VITALS
TEMPERATURE: 97.6 F | DIASTOLIC BLOOD PRESSURE: 109 MMHG | RESPIRATION RATE: 18 BRPM | SYSTOLIC BLOOD PRESSURE: 218 MMHG | HEART RATE: 78 BPM | OXYGEN SATURATION: 96 %

## 2019-06-05 DIAGNOSIS — K20.90 ESOPHAGITIS DETERMINED BY ENDOSCOPY: ICD-10-CM

## 2019-06-05 DIAGNOSIS — I85.00 ESOPHAGEAL VARICES WITHOUT BLEEDING, UNSPECIFIED ESOPHAGEAL VARICES TYPE (H): ICD-10-CM

## 2019-06-05 DIAGNOSIS — K29.80 DUODENITIS: Primary | ICD-10-CM

## 2019-06-05 PROCEDURE — 25800030 ZZH RX IP 258 OP 636: Performed by: SURGERY

## 2019-06-05 PROCEDURE — 88342 IMHCHEM/IMCYTCHM 1ST ANTB: CPT

## 2019-06-05 PROCEDURE — 43239 EGD BIOPSY SINGLE/MULTIPLE: CPT | Performed by: NURSE ANESTHETIST, CERTIFIED REGISTERED

## 2019-06-05 PROCEDURE — 25000125 ZZHC RX 250: Performed by: NURSE ANESTHETIST, CERTIFIED REGISTERED

## 2019-06-05 PROCEDURE — 25000128 H RX IP 250 OP 636: Performed by: NURSE ANESTHETIST, CERTIFIED REGISTERED

## 2019-06-05 PROCEDURE — 40000010 ZZH STATISTIC ANES STAT CODE-CRNA PER MINUTE: Performed by: SURGERY

## 2019-06-05 PROCEDURE — 99100 ANES PT EXTEME AGE<1 YR&>70: CPT | Performed by: NURSE ANESTHETIST, CERTIFIED REGISTERED

## 2019-06-05 PROCEDURE — 88305 TISSUE EXAM BY PATHOLOGIST: CPT

## 2019-06-05 PROCEDURE — 25000125 ZZHC RX 250: Performed by: SURGERY

## 2019-06-05 PROCEDURE — 43239 EGD BIOPSY SINGLE/MULTIPLE: CPT | Performed by: SURGERY

## 2019-06-05 RX ORDER — PROPOFOL 10 MG/ML
INJECTION, EMULSION INTRAVENOUS PRN
Status: DISCONTINUED | OUTPATIENT
Start: 2019-06-05 | End: 2019-06-05

## 2019-06-05 RX ORDER — ONDANSETRON 2 MG/ML
4 INJECTION INTRAMUSCULAR; INTRAVENOUS EVERY 6 HOURS PRN
Status: DISCONTINUED | OUTPATIENT
Start: 2019-06-05 | End: 2019-06-05 | Stop reason: HOSPADM

## 2019-06-05 RX ORDER — GLYCOPYRROLATE 0.2 MG/ML
INJECTION, SOLUTION INTRAMUSCULAR; INTRAVENOUS PRN
Status: DISCONTINUED | OUTPATIENT
Start: 2019-06-05 | End: 2019-06-05

## 2019-06-05 RX ORDER — ONDANSETRON 2 MG/ML
4 INJECTION INTRAMUSCULAR; INTRAVENOUS
Status: DISCONTINUED | OUTPATIENT
Start: 2019-06-05 | End: 2019-06-05 | Stop reason: HOSPADM

## 2019-06-05 RX ORDER — SODIUM CHLORIDE, SODIUM LACTATE, POTASSIUM CHLORIDE, CALCIUM CHLORIDE 600; 310; 30; 20 MG/100ML; MG/100ML; MG/100ML; MG/100ML
INJECTION, SOLUTION INTRAVENOUS CONTINUOUS
Status: DISCONTINUED | OUTPATIENT
Start: 2019-06-05 | End: 2019-06-05 | Stop reason: HOSPADM

## 2019-06-05 RX ORDER — ONDANSETRON 4 MG/1
4 TABLET, ORALLY DISINTEGRATING ORAL EVERY 6 HOURS PRN
Status: DISCONTINUED | OUTPATIENT
Start: 2019-06-05 | End: 2019-06-05 | Stop reason: HOSPADM

## 2019-06-05 RX ORDER — PROPOFOL 10 MG/ML
INJECTION, EMULSION INTRAVENOUS CONTINUOUS PRN
Status: DISCONTINUED | OUTPATIENT
Start: 2019-06-05 | End: 2019-06-05

## 2019-06-05 RX ORDER — SUCRALFATE 1 G/1
1 TABLET ORAL
Qty: 90 TABLET | Refills: 3 | Status: ON HOLD | OUTPATIENT
Start: 2019-06-05 | End: 2022-06-18

## 2019-06-05 RX ORDER — NALOXONE HYDROCHLORIDE 0.4 MG/ML
.1-.4 INJECTION, SOLUTION INTRAMUSCULAR; INTRAVENOUS; SUBCUTANEOUS
Status: DISCONTINUED | OUTPATIENT
Start: 2019-06-05 | End: 2019-06-05 | Stop reason: HOSPADM

## 2019-06-05 RX ORDER — FLUMAZENIL 0.1 MG/ML
0.2 INJECTION, SOLUTION INTRAVENOUS
Status: DISCONTINUED | OUTPATIENT
Start: 2019-06-05 | End: 2019-06-05 | Stop reason: HOSPADM

## 2019-06-05 RX ORDER — LIDOCAINE 40 MG/G
CREAM TOPICAL
Status: DISCONTINUED | OUTPATIENT
Start: 2019-06-05 | End: 2019-06-05 | Stop reason: HOSPADM

## 2019-06-05 RX ADMIN — PROPOFOL 100 MG: 10 INJECTION, EMULSION INTRAVENOUS at 09:10

## 2019-06-05 RX ADMIN — SODIUM CHLORIDE, POTASSIUM CHLORIDE, SODIUM LACTATE AND CALCIUM CHLORIDE: 600; 310; 30; 20 INJECTION, SOLUTION INTRAVENOUS at 08:55

## 2019-06-05 RX ADMIN — LIDOCAINE HYDROCHLORIDE 0.1 ML: 10 INJECTION, SOLUTION EPIDURAL; INFILTRATION; INTRACAUDAL; PERINEURAL at 08:56

## 2019-06-05 RX ADMIN — LIDOCAINE HYDROCHLORIDE 5 ML: 10 INJECTION, SOLUTION EPIDURAL; INFILTRATION; INTRACAUDAL; PERINEURAL at 09:10

## 2019-06-05 RX ADMIN — GLYCOPYRROLATE 0.2 MG: 0.2 INJECTION, SOLUTION INTRAMUSCULAR; INTRAVENOUS at 09:06

## 2019-06-05 RX ADMIN — PROPOFOL 150 MCG/KG/MIN: 10 INJECTION, EMULSION INTRAVENOUS at 09:10

## 2019-06-05 ASSESSMENT — COPD QUESTIONNAIRES: COPD: 1

## 2019-06-05 ASSESSMENT — LIFESTYLE VARIABLES: TOBACCO_USE: 1

## 2019-06-05 NOTE — OP NOTE
PROCEDURE NOTE    SURGEON: Izzy Cancino MD.    PRE-OP DIAGNOSIS:   Dysphagia      POST-OP DIAGNOSIS: duodenitis, esophagitis, esophageal varices    PROCEDURE PLANNED:   Diagnostic EGD, flexible        PROCEDURE PERFORMED:  EGD with biopsy, flexible    SPECIMEN:  Duodenum, antrum, GEJ biopsies    ANESTHESIA: See anesthesia record,  Coverage requested due to: age more than 68    ESTIMATED BLOOD LOSS: None    COMPLICATIONS:  None    INDICATION FORTHE PROCEDURE: The patient is a 69 year oldmale. The patient presents with dysphagia. I explained to the patient the risks, benefits and alternatives to diagnostic EGD for evaluating and treating his complaints. We specifically discussed the risks of bleeding, infection, perforation and the risks of sedation. The patient's questions were answered and the patient wished to proceed. Informed consent paperwork was completed.    PROCEDURE: The patient was taken to the endoscopy suite. Appropriate monitors were attached. The patient was placed in the left lateral decubitus position. Bite block was positioned.Timeout was performed confirming the patient's identity and procedure to be performed. After appropriate sedation was confirmed, the flexible endoscope was advanced into the oropharynx. The posterior oropharynx appeared grossly normal. The scope was advanced into the proximal esophagus. The esophagus was insufflated with air. The scope was advanced under direct visualization. No acute abnormalities of the esophagus were noted. There was healing of the previous dilation site. The scope was advanced into the stomach. Mild gastritis was noted. The scope was advanced through the pylorus into the duodenal bulb. The bulb appeared slightly inflamed. Biopsies were taken. The scope was withdrawn back into the stomach. Antral biopsy was obtained and sent to pathology. The scope was retroflexed and the GE junction inspected. The scope was returned to a neutral position and the stomach  was decompressed. The scope was withdrawn to the GE junction. No stricture was noted. Biopsy was obtained. The mucosa of the esophagus was inspected while withdrawing the scope. Varices were noted. No other abnormalities were noted. The scope was withdrawn from the patient. The bite block was removed. The patient tolerated the procedure with no immediately apparent complication. The patient was taken to recovery in stable condition.    FOLLOW UP:  RECOMMENDATIONS Will call with pathology results.carafate 1 gm orally three times a day.

## 2019-06-05 NOTE — H&P
CHIEF COMPLAINT / REASON FOR PROCEDURE:  dysphagia    PERTINENT HISTORY   Patient complains of problems swallowing. Previous EGD 3/2019 had dilation. Was fine for a while but now with problems with food sticking again. Had H.pylori at that EGD as well-took most of his antibiotics. Refuses to establish primary care-he won't take blood pressure medications..     Past Medical History:   Diagnosis Date     Alcohol abuse      Tobacco dependence      Past Surgical History:   Procedure Laterality Date     ESOPHAGOSCOPY, GASTROSCOPY, DUODENOSCOPY (EGD), COMBINED N/A 3/20/2019    Procedure: ESOPHAGOSCOPY, GASTROSCOPY, DUODENOSCOPY (EGD), Biuopsies and Dilation of Esophagus;  Surgeon: Izzy Cancino MD;  Location: [facility] OR     NO HISTORY OF SURGERY       Other:  None  Bleeding tendencies:  No  Relevant Family History:  none    Relevant Social History:  none    A relevant review of systems was performed and wasNegative. See HPI.    ALLERGIES/SENSITIVITIES:   Allergies   Allergen Reactions     Naproxen Unknown     Passed out with aleve        CURRENT MEDICATIONS:    Current Facility-Administered Medications   Medication     lactated ringers infusion     lidocaine (LMX4) cream     lidocaine 1 % 0.1-1 mL     ondansetron (ZOFRAN) injection 4 mg     sodium chloride (PF) 0.9% PF flush 3 mL     sodium chloride (PF) 0.9% PF flush 3 mL       No current facility-administered medications on file prior to encounter.   No current outpatient medications on file prior to encounter.      PRE-SEDATION ASSESSMENT:   BP (!) 209/126   Temp 98.8  F (37.1  C) (Tympanic)   Resp 18   SpO2 97%   Lung Exam:Normal  Heart Exam:  Normal    Comment(s):      IMPRESSION:  dysphagia.    PLAN:  I discussed diagnostic EGD and possible dilation with the patient. Anesthesia requested due to: age more than 68.

## 2019-06-05 NOTE — DISCHARGE INSTRUCTIONS
Procedure you had done: EGD with biopsies  Your health care provider is:  Brian Rivero  Your surgeon is Dr. Izzy Cancino.   Please call your health care provider or surgeon at [phone] if:    - you feel you are getting worse or having an increase in problems    - fever greater than 101 degrees  - increasing shortness of breath or chest pain  - any signs of infection (increasing redness, swelling, tenderness, warmth, change in appearance, or  increased drainage)  - blood in your urine or stool  - coughing or vomiting blood  - nausea (upset stomach) and vomiting and/or diarrhea that will not stop  - severe pain that is not relieved by medicine, rest or ice  You have had medications for sedation. Please be aware that this can cause drowsiness and impaired judgment for up to 24 hours after your procedure. Do not drive, operate power tools or drink alcohol for 24 hours.  If samples were taken-you will get a phone call and a letter with your results in the next 7-10 days. If you don't get results, please call and let us know!     [facility]  Adult Discharge Orders & Instructions      For 24 hours after surgery:  1. Get plenty of rest.  A responsible adult must stay with you for at least 24 hours after you leave the hospital.   2. You may feel lightheaded.  IF so, sit for a few minutes before standing.  Have someone help you get up.   3. You may have a slight fever. Call the doctor if your fever is over 101 F (38.3 C) (taken under the tongue) or lasts longer than 24 hours.  4. You may have a dry mouth, a sore throat, muscle aches or trouble sleeping.  These should go away after 24 hours.  5. Do not make important or legal decisions.  6.   Do not drive or use heavy equipment.  If you have weakness or tingling, don't drive or use heavy equipment until this feeling goes away.                                                                                                                                                                          To contact a doctor, call    [phone]______________

## 2019-06-05 NOTE — ANESTHESIA PREPROCEDURE EVALUATION
Anesthesia Pre-Procedure Evaluation    Patient: Antonio Rutherford   MRN: [id number] : 1950          Preoperative Diagnosis: esophageal stricture    Procedure(s):  ESOPHAGOGASTRODUODENOSCOPY (EGD)    Past Medical History:   Diagnosis Date     Alcohol abuse      Tobacco dependence      Past Surgical History:   Procedure Laterality Date     ESOPHAGOSCOPY, GASTROSCOPY, DUODENOSCOPY (EGD), COMBINED N/A 3/20/2019    Procedure: ESOPHAGOSCOPY, GASTROSCOPY, DUODENOSCOPY (EGD), Biuopsies and Dilation of Esophagus;  Surgeon: Izzy Cancino MD;  Location: [facility] OR     NO HISTORY OF SURGERY         Anesthesia Evaluation     . Pt has had prior anesthetic. Type: General and MAC    No history of anesthetic complications          ROS/MED HX    ENT/Pulmonary:     (+)tobacco use, Current use 2ppd packs/day  COPD, , . .    Neurologic: Comment: Right leg weakness      Cardiovascular: Comment: Non-compliant with medications, uncontrolled HTN    (+) hypertension----. : . . . :. .       METS/Exercise Tolerance:  >4 METS   Hematologic:  - neg hematologic  ROS       Musculoskeletal:  - neg musculoskeletal ROS       GI/Hepatic: Comment: Hx ETOH abuse, dysphagia        Renal/Genitourinary:         Endo:     (+) Obesity, .      Psychiatric:  - neg psychiatric ROS       Infectious Disease:         Malignancy:      - no malignancy   Other:    - neg other ROS                      Physical Exam  Normal systems: cardiovascular and pulmonary    Airway   Mallampati: I  TM distance: >3 FB  Neck ROM: full    Dental   (+) missing    Cardiovascular   Rhythm and rate: regular and normal      Pulmonary    breath sounds clear to auscultation            Lab Results   Component Value Date    HGB 15.5 2015    HCT 45.3 2015     2015     2015    POTASSIUM 4.1 2015    CHLORIDE 103 2015    CO2 27 2015    BUN 18 2015    CR 0.80 2015    GLC 86 2015    PAWAN 9.8 2015    ALBUMIN 4.4 2015  "   PROTTOTAL 7.4 06/16/2015    ALKPHOS 53 06/16/2015    BILITOTAL 0.5 06/16/2015       Preop Vitals  BP Readings from Last 3 Encounters:   04/18/19 (!) 162/108   03/20/19 (!) 186/109   03/12/19 186/86    Pulse Readings from Last 3 Encounters:   04/18/19 72   03/20/19 64   03/12/19 88      Resp Readings from Last 3 Encounters:   04/18/19 18   03/20/19 18   03/12/19 20    SpO2 Readings from Last 3 Encounters:   03/20/19 96%      Temp Readings from Last 1 Encounters:   04/18/19 98.5  F (36.9  C) (Tympanic)    Ht Readings from Last 1 Encounters:   04/18/19 1.753 m (5' 9\")      Wt Readings from Last 1 Encounters:   04/18/19 104.8 kg (231 lb)    Estimated body mass index is 34.11 kg/m  as calculated from the following:    Height as of 4/18/19: 1.753 m (5' 9\").    Weight as of 4/18/19: 104.8 kg (231 lb).       Anesthesia Plan      History & Physical Review  History and physical reviewed and following examination; no interval change.    ASA Status:  3 .    NPO Status:  > 8 hours    Plan for MAC Reason for MAC:  Chronic cardiopulmonary disease (G9) and Procedure to face, neck, head or breast         Postoperative Care      Consents  Anesthetic plan, risks, benefits and alternatives discussed with:  Patient..                 Yannick Chen CRNA, APRN CRNA  "

## 2019-06-05 NOTE — OR NURSING
Patient educated about risks of having uncontrolled high blood pressure. Patient states that he refuses to see primary doctor concerning this issue and refuses to be on medication for high blood pressure.

## 2019-06-05 NOTE — ANESTHESIA CARE TRANSFER NOTE
Patient: Antonio Rutherford    Procedure(s):  ESOPHAGOGASTRODUODENOSCOPY, WITH BIOPSY    Diagnosis: esophageal stricture  Diagnosis Additional Information: No value filed.    Anesthesia Type:   MAC     Note:  Airway :Nasal Cannula  Patient transferred to:Phase II  Handoff Report: Identifed the Patient, Identified the Reponsible Provider, Reviewed the pertinent medical history, Discussed the surgical course, Reviewed Intra-OP anesthesia mangement and issues during anesthesia, Set expectations for post-procedure period and Allowed opportunity for questions and acknowledgement of understanding      Vitals: (Last set prior to Anesthesia Care Transfer)    CRNA VITALS  6/5/2019 0855 - 6/5/2019 0925      6/5/2019             Resp Rate (set):  10                Electronically Signed By: Yannick Chen CRNA, APRN CRNA  June 5, 2019  9:25 AM

## 2019-06-05 NOTE — ANESTHESIA POSTPROCEDURE EVALUATION
Patient: Antonio Rutherford    Procedure(s):  ESOPHAGOGASTRODUODENOSCOPY, WITH BIOPSY    Diagnosis:esophageal stricture  Diagnosis Additional Information: No value filed.    Anesthesia Type:  MAC    Note:  Anesthesia Post Evaluation    Patient location during evaluation: Phase 2 and Bedside  Patient participation: Able to fully participate in evaluation  Level of consciousness: awake and alert  Pain management: adequate  Airway patency: patent  Cardiovascular status: acceptable  Respiratory status: acceptable  Hydration status: acceptable  PONV: none     Anesthetic complications: None          Last vitals:  Vitals:    06/05/19 0837   BP: (!) 209/126   Resp: 18   Temp: 98.8  F (37.1  C)   SpO2: 97%         Electronically Signed By: Yannick Chen CRNA, APRN CRNA  June 5, 2019  9:29 AM

## 2022-06-17 ENCOUNTER — APPOINTMENT (OUTPATIENT)
Dept: CT IMAGING | Facility: OTHER | Age: 72
DRG: 673 | End: 2022-06-17
Attending: PHYSICIAN ASSISTANT
Payer: MEDICARE

## 2022-06-17 ENCOUNTER — APPOINTMENT (OUTPATIENT)
Dept: GENERAL RADIOLOGY | Facility: OTHER | Age: 72
DRG: 673 | End: 2022-06-17
Attending: PHYSICIAN ASSISTANT
Payer: MEDICARE

## 2022-06-17 ENCOUNTER — HOSPITAL ENCOUNTER (INPATIENT)
Facility: OTHER | Age: 72
LOS: 25 days | Discharge: ANOTHER HOSPITAL | DRG: 673 | End: 2022-07-12
Attending: PHYSICIAN ASSISTANT | Admitting: FAMILY MEDICINE
Payer: MEDICARE

## 2022-06-17 DIAGNOSIS — Z11.52 ENCOUNTER FOR SCREENING LABORATORY TESTING FOR COVID-19 VIRUS: ICD-10-CM

## 2022-06-17 DIAGNOSIS — N17.9 ACUTE KIDNEY INJURY (H): ICD-10-CM

## 2022-06-17 DIAGNOSIS — N17.9 AKI (ACUTE KIDNEY INJURY) (H): ICD-10-CM

## 2022-06-17 DIAGNOSIS — F10.930 ALCOHOL WITHDRAWAL SYNDROME WITHOUT COMPLICATION (H): ICD-10-CM

## 2022-06-17 DIAGNOSIS — R53.1 WEAKNESS: ICD-10-CM

## 2022-06-17 DIAGNOSIS — F10.939 ALCOHOL WITHDRAWAL (H): ICD-10-CM

## 2022-06-17 PROBLEM — N17.0 ATN (ACUTE TUBULAR NECROSIS) (H): Status: ACTIVE | Noted: 2022-06-17

## 2022-06-17 PROBLEM — E86.0 DEHYDRATION: Status: ACTIVE | Noted: 2022-06-17

## 2022-06-17 LAB
ALBUMIN SERPL-MCNC: 4.4 G/DL (ref 3.5–5.7)
ALBUMIN UR-MCNC: 30 MG/DL
ALP SERPL-CCNC: 46 U/L (ref 34–104)
ALT SERPL W P-5'-P-CCNC: 24 U/L (ref 7–52)
AMPHETAMINES UR QL: NOT DETECTED
ANION GAP SERPL CALCULATED.3IONS-SCNC: 14 MMOL/L (ref 3–14)
APPEARANCE UR: CLEAR
AST SERPL W P-5'-P-CCNC: 20 U/L (ref 13–39)
BACTERIA #/AREA URNS HPF: ABNORMAL /HPF
BARBITURATES UR QL SCN: NOT DETECTED
BASOPHILS # BLD AUTO: 0.1 10E3/UL (ref 0–0.2)
BASOPHILS NFR BLD AUTO: 2 %
BENZODIAZ UR QL SCN: ABNORMAL
BILIRUB DIRECT SERPL-MCNC: 0.2 MG/DL (ref 0–0.2)
BILIRUB SERPL-MCNC: 1 MG/DL (ref 0.3–1)
BILIRUB UR QL STRIP: NEGATIVE
BUN SERPL-MCNC: 41 MG/DL (ref 7–25)
BUPRENORPHINE UR QL: NOT DETECTED
CALCIUM SERPL-MCNC: 9.5 MG/DL (ref 8.6–10.3)
CANNABINOIDS UR QL: NOT DETECTED
CHLORIDE BLD-SCNC: 99 MMOL/L (ref 98–107)
CO2 SERPL-SCNC: 24 MMOL/L (ref 21–31)
COCAINE UR QL SCN: NOT DETECTED
COLOR UR AUTO: YELLOW
CREAT SERPL-MCNC: 2.72 MG/DL (ref 0.7–1.3)
D-METHAMPHET UR QL: NOT DETECTED
EOSINOPHIL # BLD AUTO: 0.1 10E3/UL (ref 0–0.7)
EOSINOPHIL NFR BLD AUTO: 1 %
ERYTHROCYTE [DISTWIDTH] IN BLOOD BY AUTOMATED COUNT: 13.2 % (ref 10–15)
FLUAV RNA SPEC QL NAA+PROBE: NEGATIVE
FLUBV RNA RESP QL NAA+PROBE: NEGATIVE
GFR SERPL CREATININE-BSD FRML MDRD: 24 ML/MIN/1.73M2
GLUCOSE BLD-MCNC: 80 MG/DL (ref 70–105)
GLUCOSE UR STRIP-MCNC: NEGATIVE MG/DL
HCT VFR BLD AUTO: 45.3 % (ref 40–53)
HGB BLD-MCNC: 15.6 G/DL (ref 13.3–17.7)
HGB UR QL STRIP: ABNORMAL
HYALINE CASTS: 1 /LPF
IMM GRANULOCYTES # BLD: 0 10E3/UL
IMM GRANULOCYTES NFR BLD: 0 %
KETONES UR STRIP-MCNC: 10 MG/DL
LACTATE SERPL-SCNC: 1.7 MMOL/L (ref 0.7–2)
LEUKOCYTE ESTERASE UR QL STRIP: ABNORMAL
LYMPHOCYTES # BLD AUTO: 2 10E3/UL (ref 0.8–5.3)
LYMPHOCYTES NFR BLD AUTO: 28 %
MAGNESIUM SERPL-MCNC: 2.5 MG/DL (ref 1.9–2.7)
MAGNESIUM SERPL-MCNC: 2.6 MG/DL (ref 1.9–2.7)
MCH RBC QN AUTO: 35.1 PG (ref 26.5–33)
MCHC RBC AUTO-ENTMCNC: 34.4 G/DL (ref 31.5–36.5)
MCV RBC AUTO: 102 FL (ref 78–100)
METHADONE UR QL SCN: NOT DETECTED
MONOCYTES # BLD AUTO: 0.7 10E3/UL (ref 0–1.3)
MONOCYTES NFR BLD AUTO: 10 %
MONOCYTES NFR BLD AUTO: NEGATIVE %
MUCOUS THREADS #/AREA URNS LPF: PRESENT /LPF
NEUTROPHILS # BLD AUTO: 4.3 10E3/UL (ref 1.6–8.3)
NEUTROPHILS NFR BLD AUTO: 59 %
NITRATE UR QL: NEGATIVE
NRBC # BLD AUTO: 0 10E3/UL
NRBC BLD AUTO-RTO: 0 /100
OPIATES UR QL SCN: NOT DETECTED
OXYCODONE UR QL SCN: NOT DETECTED
PCP UR QL SCN: NOT DETECTED
PH UR STRIP: 5.5 [PH] (ref 5–9)
PHOSPHATE SERPL-MCNC: 3.8 MG/DL (ref 2.5–5)
PLATELET # BLD AUTO: 283 10E3/UL (ref 150–450)
POTASSIUM BLD-SCNC: 3.8 MMOL/L (ref 3.5–5.1)
PROCALCITONIN SERPL-MCNC: <0.5 NG/ML
PROPOXYPH UR QL: NOT DETECTED
PROT SERPL-MCNC: 7.8 G/DL (ref 6.4–8.9)
RBC # BLD AUTO: 4.44 10E6/UL (ref 4.4–5.9)
RBC URINE: 6 /HPF
RSV RNA SPEC NAA+PROBE: NEGATIVE
SARS-COV-2 RNA RESP QL NAA+PROBE: NEGATIVE
SODIUM SERPL-SCNC: 137 MMOL/L (ref 134–144)
SP GR UR STRIP: 1.02 (ref 1–1.03)
TRICYCLICS UR QL SCN: NOT DETECTED
TROPONIN I SERPL-MCNC: 22.6 PG/ML (ref 0–34)
UROBILINOGEN UR STRIP-MCNC: NORMAL MG/DL
WBC # BLD AUTO: 7.3 10E3/UL (ref 4–11)
WBC URINE: 7 /HPF

## 2022-06-17 PROCEDURE — 83605 ASSAY OF LACTIC ACID: CPT | Performed by: PHYSICIAN ASSISTANT

## 2022-06-17 PROCEDURE — 36410 VNPNXR 3YR/> PHY/QHP DX/THER: CPT | Performed by: NURSE ANESTHETIST, CERTIFIED REGISTERED

## 2022-06-17 PROCEDURE — 99285 EMERGENCY DEPT VISIT HI MDM: CPT | Mod: 25 | Performed by: PHYSICIAN ASSISTANT

## 2022-06-17 PROCEDURE — 83735 ASSAY OF MAGNESIUM: CPT | Performed by: PHYSICIAN ASSISTANT

## 2022-06-17 PROCEDURE — 74176 CT ABD & PELVIS W/O CONTRAST: CPT

## 2022-06-17 PROCEDURE — 96360 HYDRATION IV INFUSION INIT: CPT | Performed by: PHYSICIAN ASSISTANT

## 2022-06-17 PROCEDURE — 93010 ELECTROCARDIOGRAM REPORT: CPT | Performed by: INTERNAL MEDICINE

## 2022-06-17 PROCEDURE — 80306 DRUG TEST PRSMV INSTRMNT: CPT | Performed by: PHYSICIAN ASSISTANT

## 2022-06-17 PROCEDURE — 99285 EMERGENCY DEPT VISIT HI MDM: CPT | Mod: CS | Performed by: PHYSICIAN ASSISTANT

## 2022-06-17 PROCEDURE — 87637 SARSCOV2&INF A&B&RSV AMP PRB: CPT | Performed by: PHYSICIAN ASSISTANT

## 2022-06-17 PROCEDURE — 250N000013 HC RX MED GY IP 250 OP 250 PS 637: Performed by: FAMILY MEDICINE

## 2022-06-17 PROCEDURE — 99223 1ST HOSP IP/OBS HIGH 75: CPT | Mod: AI | Performed by: FAMILY MEDICINE

## 2022-06-17 PROCEDURE — 80053 COMPREHEN METABOLIC PANEL: CPT | Performed by: PHYSICIAN ASSISTANT

## 2022-06-17 PROCEDURE — 81003 URINALYSIS AUTO W/O SCOPE: CPT | Performed by: PHYSICIAN ASSISTANT

## 2022-06-17 PROCEDURE — 71045 X-RAY EXAM CHEST 1 VIEW: CPT

## 2022-06-17 PROCEDURE — 36415 COLL VENOUS BLD VENIPUNCTURE: CPT | Performed by: PHYSICIAN ASSISTANT

## 2022-06-17 PROCEDURE — 93005 ELECTROCARDIOGRAM TRACING: CPT | Performed by: PHYSICIAN ASSISTANT

## 2022-06-17 PROCEDURE — 31500 INSERT EMERGENCY AIRWAY: CPT | Performed by: NURSE ANESTHETIST, CERTIFIED REGISTERED

## 2022-06-17 PROCEDURE — 84484 ASSAY OF TROPONIN QUANT: CPT | Performed by: PHYSICIAN ASSISTANT

## 2022-06-17 PROCEDURE — 82248 BILIRUBIN DIRECT: CPT | Performed by: PHYSICIAN ASSISTANT

## 2022-06-17 PROCEDURE — 258N000003 HC RX IP 258 OP 636: Performed by: FAMILY MEDICINE

## 2022-06-17 PROCEDURE — 96361 HYDRATE IV INFUSION ADD-ON: CPT | Performed by: PHYSICIAN ASSISTANT

## 2022-06-17 PROCEDURE — C9803 HOPD COVID-19 SPEC COLLECT: HCPCS | Performed by: PHYSICIAN ASSISTANT

## 2022-06-17 PROCEDURE — 84100 ASSAY OF PHOSPHORUS: CPT | Performed by: FAMILY MEDICINE

## 2022-06-17 PROCEDURE — 86308 HETEROPHILE ANTIBODY SCREEN: CPT | Performed by: PHYSICIAN ASSISTANT

## 2022-06-17 PROCEDURE — 83735 ASSAY OF MAGNESIUM: CPT | Performed by: FAMILY MEDICINE

## 2022-06-17 PROCEDURE — 87086 URINE CULTURE/COLONY COUNT: CPT | Performed by: PHYSICIAN ASSISTANT

## 2022-06-17 PROCEDURE — 36415 COLL VENOUS BLD VENIPUNCTURE: CPT | Performed by: FAMILY MEDICINE

## 2022-06-17 PROCEDURE — 85025 COMPLETE CBC W/AUTO DIFF WBC: CPT | Performed by: PHYSICIAN ASSISTANT

## 2022-06-17 PROCEDURE — 87040 BLOOD CULTURE FOR BACTERIA: CPT | Mod: 91 | Performed by: PHYSICIAN ASSISTANT

## 2022-06-17 PROCEDURE — 120N000001 HC R&B MED SURG/OB

## 2022-06-17 PROCEDURE — 84145 PROCALCITONIN (PCT): CPT | Performed by: PHYSICIAN ASSISTANT

## 2022-06-17 PROCEDURE — 258N000003 HC RX IP 258 OP 636: Performed by: PHYSICIAN ASSISTANT

## 2022-06-17 RX ORDER — CLONIDINE HYDROCHLORIDE 0.1 MG/1
0.1 TABLET ORAL EVERY 8 HOURS
Status: DISCONTINUED | OUTPATIENT
Start: 2022-06-17 | End: 2022-06-18

## 2022-06-17 RX ORDER — FLUMAZENIL 0.1 MG/ML
0.2 INJECTION, SOLUTION INTRAVENOUS
Status: DISCONTINUED | OUTPATIENT
Start: 2022-06-17 | End: 2022-06-19

## 2022-06-17 RX ORDER — LORAZEPAM 1 MG/1
1-2 TABLET ORAL EVERY 30 MIN PRN
Status: DISCONTINUED | OUTPATIENT
Start: 2022-06-17 | End: 2022-06-19

## 2022-06-17 RX ORDER — IBUPROFEN 400 MG/1
400 TABLET, FILM COATED ORAL 2 TIMES DAILY PRN
Status: ON HOLD | COMMUNITY
End: 2022-07-12

## 2022-06-17 RX ORDER — HALOPERIDOL 5 MG/ML
2.5-5 INJECTION INTRAMUSCULAR EVERY 6 HOURS PRN
Status: DISCONTINUED | OUTPATIENT
Start: 2022-06-17 | End: 2022-06-27

## 2022-06-17 RX ORDER — FOLIC ACID 1 MG/1
1 TABLET ORAL DAILY
Status: DISCONTINUED | OUTPATIENT
Start: 2022-06-18 | End: 2022-06-22

## 2022-06-17 RX ORDER — LIDOCAINE 40 MG/G
CREAM TOPICAL
Status: DISCONTINUED | OUTPATIENT
Start: 2022-06-17 | End: 2022-07-12 | Stop reason: HOSPADM

## 2022-06-17 RX ORDER — LORAZEPAM 2 MG/ML
1-2 INJECTION INTRAMUSCULAR EVERY 30 MIN PRN
Status: DISCONTINUED | OUTPATIENT
Start: 2022-06-17 | End: 2022-06-19

## 2022-06-17 RX ORDER — DICYCLOMINE HCL 20 MG
20 TABLET ORAL 2 TIMES DAILY PRN
COMMUNITY

## 2022-06-17 RX ORDER — SODIUM CHLORIDE 9 MG/ML
INJECTION, SOLUTION INTRAVENOUS CONTINUOUS
Status: DISCONTINUED | OUTPATIENT
Start: 2022-06-17 | End: 2022-06-20

## 2022-06-17 RX ORDER — SULFAMETHOXAZOLE/TRIMETHOPRIM 800-160 MG
1 TABLET ORAL 2 TIMES DAILY
Status: ON HOLD | COMMUNITY
End: 2022-07-12

## 2022-06-17 RX ORDER — OLANZAPINE 5 MG/1
5-10 TABLET, ORALLY DISINTEGRATING ORAL EVERY 6 HOURS PRN
Status: DISCONTINUED | OUTPATIENT
Start: 2022-06-17 | End: 2022-06-27

## 2022-06-17 RX ORDER — LISINOPRIL 20 MG/1
20 TABLET ORAL DAILY
COMMUNITY

## 2022-06-17 RX ORDER — MULTIPLE VITAMINS W/ MINERALS TAB 9MG-400MCG
1 TAB ORAL DAILY
Status: DISCONTINUED | OUTPATIENT
Start: 2022-06-18 | End: 2022-06-22

## 2022-06-17 RX ORDER — NICOTINE 21 MG/24HR
1 PATCH, TRANSDERMAL 24 HOURS TRANSDERMAL DAILY
Status: DISCONTINUED | OUTPATIENT
Start: 2022-06-17 | End: 2022-07-08

## 2022-06-17 RX ORDER — NICOTINE POLACRILEX 4 MG/1
20 GUM, CHEWING ORAL DAILY
COMMUNITY

## 2022-06-17 RX ADMIN — CLONIDINE HYDROCHLORIDE 0.1 MG: 0.1 TABLET ORAL at 21:49

## 2022-06-17 RX ADMIN — SODIUM CHLORIDE: 9 INJECTION, SOLUTION INTRAVENOUS at 21:49

## 2022-06-17 RX ADMIN — SODIUM CHLORIDE 1000 ML: 9 INJECTION, SOLUTION INTRAVENOUS at 16:20

## 2022-06-17 RX ADMIN — NICOTINE 1 PATCH: 14 PATCH, EXTENDED RELEASE TRANSDERMAL at 21:49

## 2022-06-17 ASSESSMENT — ACTIVITIES OF DAILY LIVING (ADL)
CHANGE_IN_FUNCTIONAL_STATUS_SINCE_ONSET_OF_CURRENT_ILLNESS/INJURY: NO
ADLS_ACUITY_SCORE: 38
WALKING_OR_CLIMBING_STAIRS_DIFFICULTY: YES
FALL_HISTORY_WITHIN_LAST_SIX_MONTHS: NO
DOING_ERRANDS_INDEPENDENTLY_DIFFICULTY: YES
DIFFICULTY_EATING/SWALLOWING: NO
DRESSING/BATHING: BATHING DIFFICULTY, ASSISTANCE 1 PERSON;DRESSING DIFFICULTY, ASSISTANCE 1 PERSON
BATHING: 1-->ASSISTANCE NEEDED
CONCENTRATING,_REMEMBERING_OR_MAKING_DECISIONS_DIFFICULTY: YES
TOILETING_ISSUES: NO
DRESS: 1-->ASSISTANCE (EQUIPMENT/PERSON) NEEDED
DRESS: 0-->ASSISTANCE NEEDED (DEVELOPMENTALLY APPROPRIATE)
DRESSING/BATHING_DIFFICULTY: YES
ADLS_ACUITY_SCORE: 41
TRANSFERRING: 0-->ASSISTANCE NEEDED (DEVELOPMENTALLY APPROPRIATE)
TRANSFERRING: 1-->ASSISTANCE (EQUIPMENT/PERSON) NEEDED
WALKING_OR_CLIMBING_STAIRS: AMBULATION DIFFICULTY, ASSISTANCE 1 PERSON;STAIR CLIMBING DIFFICULTY, ASSISTANCE 1 PERSON;TRANSFERRING DIFFICULTY, ASSISTANCE 1 PERSON
WEAR_GLASSES_OR_BLIND: NO

## 2022-06-17 NOTE — ED TRIAGE NOTES
Patient here from the local care home for weakness and patient reports his entire body hurts for the last 5 days.  Officer reports that patient has not had anything to eat or drink for 5 days.     Triage Assessment     Row Name 06/17/22 1602       Triage Assessment (Adult)    Airway WDL WDL       Respiratory WDL    Respiratory WDL WDL       Skin Circulation/Temperature WDL    Skin Circulation/Temperature WDL X       Cardiac WDL    Cardiac WDL WDL       Peripheral/Neurovascular WDL    Peripheral Neurovascular WDL X
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
## 2022-06-17 NOTE — PROGRESS NOTES
Call received from RONALDO Torres at [facility]. Pt is c/o generalized weakness, unsteady gait, decreased appetite, SpO2 74% room air, /60. Pt was incarcerated on 6/10/2022 and is on an alcohol withdrawal protocol.

## 2022-06-18 LAB
ALBUMIN SERPL-MCNC: 3.6 G/DL (ref 3.5–5.7)
ALP SERPL-CCNC: 40 U/L (ref 34–104)
ALT SERPL W P-5'-P-CCNC: 18 U/L (ref 7–52)
ANION GAP SERPL CALCULATED.3IONS-SCNC: 13 MMOL/L (ref 3–14)
AST SERPL W P-5'-P-CCNC: 17 U/L (ref 13–39)
ATRIAL RATE - MUSE: 80 BPM
BILIRUB SERPL-MCNC: 0.9 MG/DL (ref 0.3–1)
BUN SERPL-MCNC: 36 MG/DL (ref 7–25)
CALCIUM SERPL-MCNC: 8.6 MG/DL (ref 8.6–10.3)
CHLORIDE BLD-SCNC: 106 MMOL/L (ref 98–107)
CO2 SERPL-SCNC: 21 MMOL/L (ref 21–31)
CREAT SERPL-MCNC: 2.06 MG/DL (ref 0.7–1.3)
DIASTOLIC BLOOD PRESSURE - MUSE: NORMAL MMHG
ERYTHROCYTE [DISTWIDTH] IN BLOOD BY AUTOMATED COUNT: 13.2 % (ref 10–15)
GFR SERPL CREATININE-BSD FRML MDRD: 33 ML/MIN/1.73M2
GLUCOSE BLD-MCNC: 69 MG/DL (ref 70–105)
HCT VFR BLD AUTO: 43.1 % (ref 40–53)
HGB BLD-MCNC: 14.5 G/DL (ref 13.3–17.7)
INTERPRETATION ECG - MUSE: NORMAL
MCH RBC QN AUTO: 35.3 PG (ref 26.5–33)
MCHC RBC AUTO-ENTMCNC: 33.6 G/DL (ref 31.5–36.5)
MCV RBC AUTO: 105 FL (ref 78–100)
P AXIS - MUSE: 38 DEGREES
PLATELET # BLD AUTO: 234 10E3/UL (ref 150–450)
POTASSIUM BLD-SCNC: 4.1 MMOL/L (ref 3.5–5.1)
PR INTERVAL - MUSE: 168 MS
PROT SERPL-MCNC: 6.1 G/DL (ref 6.4–8.9)
QRS DURATION - MUSE: 94 MS
QT - MUSE: 396 MS
QTC - MUSE: 456 MS
R AXIS - MUSE: 67 DEGREES
RBC # BLD AUTO: 4.11 10E6/UL (ref 4.4–5.9)
SODIUM SERPL-SCNC: 140 MMOL/L (ref 134–144)
SYSTOLIC BLOOD PRESSURE - MUSE: NORMAL MMHG
T AXIS - MUSE: 57 DEGREES
VENTRICULAR RATE- MUSE: 80 BPM
WBC # BLD AUTO: 5.2 10E3/UL (ref 4–11)

## 2022-06-18 PROCEDURE — 36415 COLL VENOUS BLD VENIPUNCTURE: CPT | Performed by: FAMILY MEDICINE

## 2022-06-18 PROCEDURE — 250N000013 HC RX MED GY IP 250 OP 250 PS 637: Performed by: FAMILY MEDICINE

## 2022-06-18 PROCEDURE — 85027 COMPLETE CBC AUTOMATED: CPT | Performed by: FAMILY MEDICINE

## 2022-06-18 PROCEDURE — 99232 SBSQ HOSP IP/OBS MODERATE 35: CPT | Performed by: FAMILY MEDICINE

## 2022-06-18 PROCEDURE — 120N000001 HC R&B MED SURG/OB

## 2022-06-18 PROCEDURE — 80053 COMPREHEN METABOLIC PANEL: CPT | Performed by: FAMILY MEDICINE

## 2022-06-18 PROCEDURE — 258N000003 HC RX IP 258 OP 636: Performed by: FAMILY MEDICINE

## 2022-06-18 RX ORDER — PANTOPRAZOLE SODIUM 40 MG/1
40 TABLET, DELAYED RELEASE ORAL
Status: DISCONTINUED | OUTPATIENT
Start: 2022-06-18 | End: 2022-06-22

## 2022-06-18 RX ORDER — LANOLIN ALCOHOL/MO/W.PET/CERES
3 CREAM (GRAM) TOPICAL
Status: DISCONTINUED | OUTPATIENT
Start: 2022-06-18 | End: 2022-06-22 | Stop reason: CLARIF

## 2022-06-18 RX ORDER — CLONIDINE HYDROCHLORIDE 0.1 MG/1
0.1 TABLET ORAL 2 TIMES DAILY PRN
Status: DISCONTINUED | OUTPATIENT
Start: 2022-06-18 | End: 2022-07-08

## 2022-06-18 RX ORDER — LANOLIN ALCOHOL/MO/W.PET/CERES
100 CREAM (GRAM) TOPICAL DAILY
COMMUNITY

## 2022-06-18 RX ADMIN — OLANZAPINE 10 MG: 5 TABLET, ORALLY DISINTEGRATING ORAL at 22:42

## 2022-06-18 RX ADMIN — CLONIDINE HYDROCHLORIDE 0.1 MG: 0.1 TABLET ORAL at 04:35

## 2022-06-18 RX ADMIN — MELATONIN TAB 3 MG 3 MG: 3 TAB at 21:14

## 2022-06-18 RX ADMIN — Medication 1 TABLET: at 07:46

## 2022-06-18 RX ADMIN — LORAZEPAM 1 MG: 1 TABLET ORAL at 07:47

## 2022-06-18 RX ADMIN — SODIUM CHLORIDE: 9 INJECTION, SOLUTION INTRAVENOUS at 04:34

## 2022-06-18 RX ADMIN — LORAZEPAM 1 MG: 1 TABLET ORAL at 01:43

## 2022-06-18 RX ADMIN — SODIUM CHLORIDE: 9 INJECTION, SOLUTION INTRAVENOUS at 21:41

## 2022-06-18 RX ADMIN — FOLIC ACID 1 MG: 1 TABLET ORAL at 07:47

## 2022-06-18 RX ADMIN — THIAMINE HCL TAB 100 MG 100 MG: 100 TAB at 07:47

## 2022-06-18 RX ADMIN — LORAZEPAM 1 MG: 1 TABLET ORAL at 21:14

## 2022-06-18 RX ADMIN — PANTOPRAZOLE SODIUM 40 MG: 40 TABLET, DELAYED RELEASE ORAL at 11:36

## 2022-06-18 ASSESSMENT — ACTIVITIES OF DAILY LIVING (ADL)
ADLS_ACUITY_SCORE: 40
ADLS_ACUITY_SCORE: 45
ADLS_ACUITY_SCORE: 40
ADLS_ACUITY_SCORE: 40
ADLS_ACUITY_SCORE: 44
ADLS_ACUITY_SCORE: 41
ADLS_ACUITY_SCORE: 40

## 2022-06-18 ASSESSMENT — ENCOUNTER SYMPTOMS
ABDOMINAL PAIN: 1
CONFUSION: 0
DYSURIA: 0
NAUSEA: 0
FEVER: 0
VOMITING: 0
SHORTNESS OF BREATH: 0
FATIGUE: 1

## 2022-06-18 NOTE — PHARMACY-ADMISSION MEDICATION HISTORY
Pharmacy -- Admission Medication Reconciliation    Prior to admission (PTA) medications were reviewed and the patient's PTA medication list was updated.    Sources Consulted: [facility] Gottlieb    The reliability of this Medication Reconciliation is: Reliability: Borderline reliable    The following significant changes were made:  -Added Thiamine  -Removed Sucralfate (no fills per Leigh Annrison)  -Removed B complex    6/17 1941 ED nursing note from Aria DUMONT (RN) regarding additional changes made to med list per [facility]:  Called [facility] for current med list:      *Dicyclomine 20 mg BID for diarrhea  *Ibuprofen 400 mg BID PRN Lisinopril 20 mg daily  *Omeprazole 20 mg daily  *Bactrim DS 1 tablet twice daily.  Started this on 6-13.  Pt was unable to give a UA in the longterm so NP treated for bladder infection.    *Vitamin B 100mg daily     Was booked on 06-. He was treated for alcohol WD but finished the protocol. Has only drank 1 bottle of Gatorade since he was admitted.  Pt is a poor historian.    In addition, the patient's allergies were reviewed and left as follows:   Allergies: Naproxen       Medication barriers identified: [facility] has been administering medications   Medication adherence concerns: none noted   Understanding of emergency medications: N/A    Kuldeep Hanson RPH, 6/18/2022,  6:57 PM

## 2022-06-18 NOTE — PROGRESS NOTES
[facility]    Medicine Progress Note - Hospitalist Service    Date of Admission:  6/17/2022    Assessment & Plan               Antonio Rutherford is a 72 year old male admitted on 6/17/2022. He presented from the long-term for concerns of possible kidney stone versus sepsis.    Patient was admitted to the long-term 7 days ago.  He is a daily drinker of whiskey and was treated for alcohol withdrawal at the long-term and then started on Bactrim for possible UTI 4 days ago.  He has had extremely poor oral intake.    He was found in the emergency department to have acute kidney injury likely due to ATN secondary to Bactrim.  He is clinically dehydrated and likely still with a component of alcohol withdrawal.  Also has been given ibuprofen.  We have not found evidence of sepsis at this time.    Patient was admitted treated with thiamine, folate and IV fluids, strict I's and O's and close monitoring.    Patient has had over 2 L in intravenously with poor oral hydration overnight.  He has urinated twice now and does feel better this morning.  Kidney function has improved.  He has exhibited signs and symptoms of mild withdrawal and has required 2 mg of Ativan overnight    Active Problems:    Alcohol withdrawal (H)    Assessment: Stable with likely mild withdrawal versus transient delirium.    Plan: CIWA protocol.  Ativan.  Thiamine and folate.      ATN (acute tubular necrosis) (H)    Assessment: Patient likely has acute kidney injury from dehydration but I suspect this may also be due to administration of Bactrim causing possible ATN.  Has improved significantly from admit suggesting this was more likely prerenal.    Plan: Hydrate.  Monitor I's and O's closely.  Recheck BMP in AM.    Dehydration    Assessment: Clinically now with mild to moderate dehydration.    Plan: Patient has been given greater than 2 L of fluids.  Continue to encourage oral hydration.  Reduce IV rate to 100 mL/h.    CHARLOTTE (acute kidney injury) (H)     "Assessment: Likely multifactorial with prerenal azotemia and possible ATN.    Plan: Hydrate and monitor closely.  Hold all NSAIDs.         Diet: Combination Diet Regular Diet Adult    DVT Prophylaxis: Pneumatic Compression Devices  Escobedo Catheter: Not present  Central Lines: None  Cardiac Monitoring: ACTIVE order. Indication: Electrolyte Imbalance (24 hours)- Magnesium <1.3 mg/ml; Potassium < =2.8 or > 5.5 mg/ml  Code Status: Full Code      Disposition Plan   Expected Discharge:  1-2d   Anticipated discharge location:  Awaiting care coordination huddle  Delays:     [facility]I        The patient's care was discussed with the Patient.    Karl Menjivar MD  Hospitalist Service  [facility]  Securely message with the Vocera Web Console (learn more here)  Text page via [employer] Paging/Directory         Clinically Significant Risk Factors Present on Admission                  # Overweight: Estimated body mass index is 28.52 kg/m  as calculated from the following:    Height as of this encounter: 1.778 m (5' 10\").    Weight as of this encounter: 90.2 kg (198 lb 12.8 oz).      ______________________________________________________________________    Interval History   Patient had a reasonably good night.  Slept for most of the night.  Minimal oral intake but when encouraged is able to drink.  Did not eat much breakfast this morning.  Tells me that he feels better and that his pain has improved significantly.  Currently with no abdominal pain.  Did urinate twice overnight although they were not able to collect volumes.  No bowel movement.  No fevers.    Data reviewed today: I reviewed all medications, new labs and imaging results over the last 24 hours. I personally reviewed no images or EKG's today.    Physical Exam   Vital Signs: Temp: 97.7  F (36.5  C) Temp src: Tympanic BP: (!) 167/89 Pulse: 79   Resp: 16 SpO2: 97 % O2 Device: None (Room air)    Weight: 198 lbs 12.8 oz  Constitutional: Patient resting " comfortably and easily aroused.  He is cooperative and appropriate  Respiratory: Clear to auscultation bilaterally  Cardiovascular: Regular rate and rhythm  GI: Abdomen is soft, nondistended.  No tenderness even with deep palpation.  Bowel sounds heard in all quadrants  Musculoskeletal: Generalized weakness with no focal deficits  Neuropsychiatric: Currently alert and oriented to person place and situation but not date    Data   Recent Labs   Lab 06/18/22  0609 06/17/22  1618   WBC 5.2 7.3   HGB 14.5 15.6   * 102*    283    137   POTASSIUM 4.1 3.8   CHLORIDE 106 99   CO2 21 24   BUN 36* 41*   CR 2.06* 2.72*   ANIONGAP 13 14   PAWAN 8.6 9.5   GLC 69* 80   ALBUMIN 3.6 4.4   PROTTOTAL 6.1* 7.8   BILITOTAL 0.9 1.0   ALKPHOS 40 46   ALT 18 24   AST 17 20     Recent Results (from the past 24 hour(s))   XR Chest Port 1 View    Narrative    XR CHEST PORT 1 VIEW    HISTORY: 72 yearsMale weakness    TECHNIQUE: A single view of the chest was performed    COMPARISON: None    FINDINGS: Heart size and pulmonary vascularity are within normal  limits. Lungs are clear. No consolidating airspace opacities are  present.        Impression    IMPRESSION: Clear chest    ASH FOX MD         SYSTEM ID:  [id number]   CT Abdomen Pelvis w/o Contrast    Narrative    Exam:CT ABDOMEN PELVIS W/O CONTRAST    History:  72 years Male with flank pain.      Technique: Axial CT imaging of the abdomen and pelvis was performed  without contrast. Coronal and sagittal reconstructions were obtained      Findings:      Lung bases:The lung bases are clear.        Kidneys: Multiple bilateral renal cysts are present. There is a  small calculus in the hilum of the left kidney, possibly vascular. No  renal calculi are otherwise seen. There is no hydronephrosis or  evidence of a ureteral calculus.       Abdomen: Evaluation is limited due to lack of intravenous contrast.  Unenhanced images of the liver spleen pancreas and adrenal glands  are  unremarkable.  There is advanced diverticulosis of the colon. No inflammatory changes  are seen at this time. No abnormally distended or thickened loops of  bowel are present.  The appendix is unremarkable.       Pelvis:There is no mass or lymphadenopathy. No abnormal fluid  collections are present.                Impression    Impression: Acute intra-abdominal findings. There is no evidence of a  ureteral calculus or hydronephrosis at this time.      ASH FOX MD         SYSTEM ID:  [id number]

## 2022-06-18 NOTE — PLAN OF CARE
"BP (!) 166/82   Pulse 80   Temp 98  F (36.7  C) (Tympanic)   Resp 16   Ht 1.778 m (5' 10\")   Wt 90.2 kg (198 lb 12.8 oz)   SpO2 97%   BMI 28.52 kg/m      Problem: Plan of Care - These are the overarching goals to be used throughout the patient stay.    Goal: Plan of Care Review/Shift Note  Description: The Plan of Care Review/Shift note should be completed every shift.  The Outcome Evaluation is a brief statement about your assessment that the patient is improving, declining, or no change.  This information will be displayed automatically on your shift note.  Outcome: Ongoing, Not Progressing  Flowsheets (Taken 6/18/2022 1848)  Plan of Care Reviewed With: patient  Overall Patient Progress: no change  Goal: Patient-Specific Goal (Individualized)  Description: You can add care plan individualizations to a care plan. Examples of Individualization might be:  \"Parent requests to be called daily at 9am for status\", \"I have a hard time hearing out of my right ear\", or \"Do not touch me to wake me up as it startles me\".  Outcome: Ongoing, Not Progressing  Flowsheets (Taken 6/18/2022 1848)  Individualized Care Needs: none stated  Anxieties, Fears or Concerns: none stated  Patient-Specific Goals (Include Timeframe): non stated  Goal: Absence of Hospital-Acquired Illness or Injury  Outcome: Ongoing, Not Progressing  Intervention: Prevent Skin Injury  Recent Flowsheet Documentation  Taken 6/18/2022 1734 by Olivia Workman RN  Body Position: position changed independently  Goal: Optimal Comfort and Wellbeing  Outcome: Ongoing, Not Progressing  Goal: Readiness for Transition of Care  Outcome: Ongoing, Not Progressing  Flowsheets (Taken 6/18/2022 1848)  Anticipated Changes Related to Illness: inability to care for self  Transportation Anticipated: family or friend will provide  Concerns to be Addressed: discharge planning  Intervention: Mutually Develop Transition Plan  Recent Flowsheet Documentation  Taken 6/18/2022 1848 " by Olivia Workman RN  Anticipated Changes Related to Illness: inability to care for self  Transportation Anticipated: family or friend will provide  Concerns to be Addressed: discharge planning     Problem: Fall Injury Risk  Goal: Absence of Fall and Fall-Related Injury  Outcome: Ongoing, Not Progressing     Problem: Behavioral Health Comorbidity  Goal: Maintenance of Behavioral Health Symptom Control  Outcome: Ongoing, Not Progressing     Problem: Alcohol Withdrawal  Goal: Alcohol Withdrawal Symptom Control  Outcome: Ongoing, Not Progressing   Goal Outcome Evaluation:    Plan of Care Reviewed With: patient     Overall Patient Progress: no change

## 2022-06-18 NOTE — PLAN OF CARE
Wandergard #5 placed on patient's Right ankle without any issues. Aurora Hdz RN on 6/18/2022 at 5:20 AM

## 2022-06-18 NOTE — PROGRESS NOTES
More oriented as the day went on.  He talked with his wife.  She and son, Jorge, stated his withdrawal is normal for him.  Wife would like for him to have help someplace for his drinking after discharge.

## 2022-06-18 NOTE — ED NOTES
Called [facility] for current med list:     *Dicyclomine 20 mg BID for diarrhea  *Ibuprofen 400 mg BID PRN Lisinopril 20 mg daily  *Omeprazole 20 mg daily  *Bactrim DS 1 tablet twice daily.  Started this on 6-13.  Pt was unable to give a UA in the residential so NP treated for bladder infection.    *Vitamin B 100mg daily    Was booked on 06-. He was treated for alcohol WD but finished the protocol. Has only drank 1 bottle of Gatorade since he was admitted.  Pt is a poor historian.    Billie Durham RN on 6/17/2022 at 8:04 PM

## 2022-06-18 NOTE — PROVIDER NOTIFICATION
06/17/22 2032   Valuables   Patient Belongings remains with patient   Patient Belongings Remaining with Patient clothing;ring   Did you bring any home meds/supplements to the hospital?  No   [facility] will make every effort per our policy to help keep your items safe while in the hospital.  If you choose to keep any items at the bedside, we cannot be held responsible for any items that are lost or broken.      List items sent to safe: NA    I have reviewed my belongings list on admission and verify that it is correct.     Patient signature_______________________________  Date/Time_____________________    2nd Staff person if patient unable to sign __________________________  Date/Time ______________________      I have received all my belongings noted above at discharge.    Patient signature________________________________  Date/Time  __________________________

## 2022-06-18 NOTE — PLAN OF CARE
Goal Outcome Evaluation:    Plan of Care Reviewed With: patient        Patient is alert, oriented to self only, impulsive, does not use call light, is SBA for mobility in room.  CIWA 9- clammy, anxious, agitated, altered mental status- ativan 1mg.  Denied pain.  Room by nurses station with door open to monitor more closely due to impulsiveness and potential flight risk- wanderguard initiated.  Continues to think that he is still in [facility] for 'they said I was messing with the girl' despite reorientation.  Encouraged fluid intake, voiding yellow/clear.  Reported that he has not eaten anything and has only had 20 oz fluid in the past week.  BP was elevated, now WDL.  Patient reported no bowel movement x5 days- bowel sounds active, abdomen rounded.  Summer Santoyo RN on 6/18/2022 at 3:15 AM

## 2022-06-18 NOTE — PLAN OF CARE
Problem: Behavioral Health Comorbidity  Goal: Maintenance of Behavioral Health Symptom Control  6/17/2022 2212 by Isaac Smith, RN  Outcome: Ongoing, Not Progressing  6/17/2022 2211 by Isaac Smith, RN  Outcome: Ongoing, Not Progressing  Intervention: Maintain Behavioral Health Symptom Control  Flowsheets  Taken 6/17/2022 2211  Medication Review/Management: medications reviewed  Taken 6/17/2022 2051  Medication Review/Management:   medications reviewed   infusion initiated     Problem: Alcohol Withdrawal  Goal: Alcohol Withdrawal Symptom Control  Outcome: Ongoing, Not Progressing  Intervention: Minimize or Manage Alcohol Withdrawal Symptoms  Flowsheets (Taken 6/17/2022 2211)  Sensory Stimulation Regulation:   auditory stimulation minimized   care clustered   quiet environment promoted   tactile stimulation minimized   visitors limited   visual stimulation minimized  Aspiration Precautions: awake/alert before oral intake  Seizure Precautions:   activity supervised   clutter-free environment maintained     Problem: Fall Injury Risk  Goal: Absence of Fall and Fall-Related Injury  Outcome: Ongoing, Not Progressing  Intervention: Identify and Manage Contributors  Flowsheets  Taken 6/17/2022 2211  Self-Care Promotion: safe use of adaptive equipment encouraged  Medication Review/Management: medications reviewed  Taken 6/17/2022 2051  Medication Review/Management:   medications reviewed   infusion initiated  Intervention: Promote Injury-Free Environment  Flowsheets  Taken 6/17/2022 2211  Safety Promotion/Fall Prevention: activity supervised  Taken 6/17/2022 2051  Safety Promotion/Fall Prevention:   activity supervised   assistive device/personal items within reach   bed alarm on   clutter free environment maintained   fall prevention program maintained   lighting adjusted   nonskid shoes/slippers when out of bed   patient and family education   room near nurse's station   room organization consistent    safety round/check completed   room door open   supervised activity   Goal Outcome Evaluation:

## 2022-06-18 NOTE — H&P
[facility]    History and Physical - Hospitalist Service       Date of Admission:  6/17/2022    Assessment & Plan      Antonio Rutherford is a 72 year old male admitted on 6/17/2022. He presented from the nursing home for concerns of possible kidney stone versus sepsis.    Patient was admitted to the nursing home 7 days ago.  He is a daily drinker of whiskey and was treated for alcohol withdrawal at the nursing home and then started on Bactrim for possible UTI 4 days ago.  He has had extremely poor oral intake.    He was found in the emergency department to have acute kidney injury likely due to ATN secondary to Bactrim.  He is clinically dehydrated and likely still with a component of alcohol withdrawal.  Also has been given ibuprofen.  We have not found evidence of sepsis at this time.    Patient will be admitted treated with thiamine, folate and IV fluids, strict I's and O's and close monitoring.    Active Problems:    Alcohol withdrawal (H)    Assessment: Currently alert and oriented to place and situation but not time.  He is not encephalopathic but likely has some degree of ongoing withdrawal delirium    Plan: CIWA protocol.  Ativan.  Thiamine and folate.      ATN (acute tubular necrosis) (H)    Assessment: Patient likely has acute kidney injury from dehydration but I suspect this may also be due to administration of Bactrim causing possible ATN.    Plan: Hydrate.  Monitor I's and O's closely.  Recheck BMP in AM.    Dehydration    Assessment: Clinically with moderate to severe dehydration.    Plan: Patient was given 1 L bolus in ED.  We will start with normal saline at 150 mL/h tonight and monitor I's/O's.    CHARLOTTE (acute kidney injury) (H)    Assessment: Likely multifactorial with prerenal azotemia and possible ATN.    Plan: Hydrate and monitor closely.  Hold all NSAIDs.       Diet: Combination Diet Regular Diet Adult    DVT Prophylaxis: Pneumatic Compression Devices  Escobedo Catheter: Not present  Central Lines:  "None  Cardiac Monitoring: None  Code Status: Full Code      Clinically Significant Risk Factors Present on Admission                  # Obesity: Estimated body mass index is 30.42 kg/m  as calculated from the following:    Height as of this encounter: 1.778 m (5' 10\").    Weight as of this encounter: 96.2 kg (212 lb).      Disposition Plan   Expected Discharge:  2-3d   Anticipated discharge location:  Awaiting care coordination huddle  Delays:     back to correction        The patient's care was discussed with the Patient.  I tried calling his brother Jorge who did not answer    Karl Menjivar MD  Hospitalist Service  [facility]  Securely message with the Vocera Web Console (learn more here)  Text page via [employer] Paging/Directory         ______________________________________________________________________    Chief Complaint   Dehydration, felt sick    History is obtained from the patient but also from correction nurse and chart    History of Present Illness   Antonio Rutherford is a 72 year old male who voluntarily went to correction 7 days ago on \"commitment\".  On further discussion it sounds as if he there was concern about sexual interaction with children.  Patient denies that anything of the sort occurred.  He tells me that he is  and has \"never been away from his wife for this long\".  For many years he has drank whiskey and water.  Estimates he goes through a liter of whiskey at least once a week.  Is not aware of going through alcohol withdrawal in the past but shortly after being in correction had signs and symptoms of withdrawal and was put on protocol.  penitentiary nurse reports this seems to have subsided a couple days ago but they were concerned he may be having bladder spasms and could not leave a urine sample so was placed on Bactrim.  He has only drank about 20 ounces of fluids in the past 7 days per correction staff.  Refuses to eat.    He tells me he just does not like to be away from home and thinks that is " "why he has been refusing to eat or drink much.  Denies any nausea or vomiting.  Later on tells me that he was hoping to \"lose a few pounds\".  He tells me he has not had a bowel movement for 5 days.  He is not aware of any pain or burning with urination. No hematuria.    He tells me he has muscle and joint aches \"all over\".  Has had some migratory abdominal pain but currently none.  Not aware of any fevers or exposure to illness.  Other than the Bactrim not aware of any new medications.    And list reviewed which includes ibuprofen and dicyclomine    Review of Systems    CONSTITUTIONAL: See HPI  INTEGUMENTARY/SKIN: NEGATIVE for worrisome rashes, moles or lesions  EYES: NEGATIVE for vision changes or irritation  ENT/MOUTH: NEGATIVE for ear, mouth and throat problems  RESP: NEGATIVE for significant cough or SOB  CV: NEGATIVE for chest pain, palpitations or peripheral edema  GI: NEGATIVE for nausea, abdominal pain, heartburn, or change in bowel habits  : NEGATIVE for frequency, dysuria, or hematuria  MUSCULOSKELETAL: See HPI  NEURO: NEGATIVE for transient or persistent neurologic deficits  ENDOCRINE: NEGATIVE for temperature intolerance, skin/hair changes  HEME: NEGATIVE for bleeding problems  PSYCHIATRIC: See HPI    Past Medical History    I have reviewed this patient's medical history and updated it with pertinent information if needed.   Past Medical History:   Diagnosis Date     Alcohol abuse      Tobacco dependence        Past Surgical History   I have reviewed this patient's surgical history and updated it with pertinent information if needed.  Past Surgical History:   Procedure Laterality Date     ESOPHAGOSCOPY, GASTROSCOPY, DUODENOSCOPY (EGD), COMBINED N/A 3/20/2019    Procedure: ESOPHAGOSCOPY, GASTROSCOPY, DUODENOSCOPY (EGD), Biuopsies and Dilation of Esophagus;  Surgeon: Izzy Cancino MD;  Location:  OR     ESOPHAGOSCOPY, GASTROSCOPY, DUODENOSCOPY (EGD), COMBINED N/A 6/5/2019    Procedure: " "ESOPHAGOGASTRODUODENOSCOPY, WITH BIOPSY;  Surgeon: Izzy Cancino MD;  Location: [facility] OR     NO HISTORY OF SURGERY         Social History   I have reviewed this patient's social history and updated it with pertinent information if needed.  Social History     Tobacco Use     Smoking status: Current Every Day Smoker     Packs/day: 1.50     Years: 62.00     Pack years: 93.00     Types: Cigarettes     Smokeless tobacco: Never Used     Tobacco comment: has cut back his smoking   Substance Use Topics     Alcohol use: Yes     Comment: Alcoholic Drinks/day: \"way too much\" 1 L per week       Family History   I have reviewed this patient's family history and updated it with pertinent information if needed.  Family History   Problem Relation Age of Onset     Other - See Comments Sister         b12 deficiency     Dysphagia Sister        Prior to Admission Medications   Prior to Admission Medications   Prescriptions Last Dose Informant Patient Reported? Taking?   dicyclomine (BENTYL) 20 MG tablet   Yes Yes   Sig: Take 20 mg by mouth 2 times daily as needed   ibuprofen (ADVIL/MOTRIN) 400 MG tablet   Yes Yes   Sig: Take 400 mg by mouth 2 times daily as needed for moderate pain   lisinopril (ZESTRIL) 20 MG tablet   Yes Yes   Sig: Take 20 mg by mouth daily   omeprazole 20 MG tablet   Yes Yes   Sig: Take 20 mg by mouth daily   sucralfate (CARAFATE) 1 GM tablet   No No   Sig: Take 1 tablet (1 g) by mouth 3 times daily (before meals)   sulfamethoxazole-trimethoprim (BACTRIM DS) 800-160 MG tablet   Yes Yes   Sig: Take 1 tablet by mouth 2 times daily   vitamin B complex with vitamin C (VITAMIN  B COMPLEX) tablet   Yes Yes   Sig: Take 1 tablet by mouth daily      Facility-Administered Medications: None     Allergies   Allergies   Allergen Reactions     Naproxen Unknown     Passed out with aleve       Physical Exam   Vital Signs: Temp: 98.2  F (36.8  C) Temp src: Tympanic BP: (!) 160/74 Pulse: 63   Resp: 15 SpO2: 95 % O2 Device: None (Room " air)    Weight: 212 lbs 0 oz    Constitutional: awake, alert, cooperative, no apparent distress, and appears stated age  Eyes: Lids and lashes normal, pupils equal, round and reactive to light, extra ocular muscles intact, sclera clear, conjunctiva red and irritated  ENT: Normocephalic, without obvious abnormality, atraumatic, sinuses nontender on palpation, external ears without lesions, dry and tacky mucous membranes without any sores or desquamation.  Hematologic / Lymphatic: No lymphadenopathy  Respiratory: Clear to auscultation bilaterally  Cardiovascular: Regular rate and rhythm.  No murmurs rubs or gallops  GI: Abdomen soft, did have some mid abdominal tenderness to palpation but this was not reproducible later in exam.  No hernia appreciated  Genitounirinary: No suprapubic or CVA tenderness  Skin: No rashes or lesions concerning for infection.  He does have very long fingernails that are tobacco stained with generally poor hygiene.  Skin is dry but no tenting.  Musculoskeletal: No synovitis.  Muscle strength equal and even  Neurologic: Cranial nerves II through XII intact.  Reflexes are symmetric.  Cerebellar function intact.  Sensation intact.  No focal motor deficits.  Neuropsychiatric: Alert and oriented to place and situation but not time    Data   Data reviewed today: I reviewed all medications, new labs and imaging results over the last 24 hours. I personally reviewed the abdominal CT image(s) showing No acute intra-abdominal disease.  Chest x-ray clear as well.  EKG with normal sinus rhythm and no evidence of acute ischemia    Recent Labs   Lab 06/17/22  1618   WBC 7.3   HGB 15.6   *         POTASSIUM 3.8   CHLORIDE 99   CO2 24   BUN 41*   CR 2.72*   ANIONGAP 14   PAWAN 9.5   GLC 80   ALBUMIN 4.4   PROTTOTAL 7.8   BILITOTAL 1.0   ALKPHOS 46   ALT 24   AST 20     Recent Results (from the past 24 hour(s))   XR Chest Port 1 View    Narrative    XR CHEST PORT 1 VIEW    HISTORY: 72  yearsMale weakness    TECHNIQUE: A single view of the chest was performed    COMPARISON: None    FINDINGS: Heart size and pulmonary vascularity are within normal  limits. Lungs are clear. No consolidating airspace opacities are  present.        Impression    IMPRESSION: Clear chest    ASH FOX MD         SYSTEM ID:  [id number]   CT Abdomen Pelvis w/o Contrast    Narrative    Exam:CT ABDOMEN PELVIS W/O CONTRAST    History:  72 years Male with flank pain.      Technique: Axial CT imaging of the abdomen and pelvis was performed  without contrast. Coronal and sagittal reconstructions were obtained      Findings:      Lung bases:The lung bases are clear.        Kidneys: Multiple bilateral renal cysts are present. There is a  small calculus in the hilum of the left kidney, possibly vascular. No  renal calculi are otherwise seen. There is no hydronephrosis or  evidence of a ureteral calculus.       Abdomen: Evaluation is limited due to lack of intravenous contrast.  Unenhanced images of the liver spleen pancreas and adrenal glands are  unremarkable.  There is advanced diverticulosis of the colon. No inflammatory changes  are seen at this time. No abnormally distended or thickened loops of  bowel are present.  The appendix is unremarkable.       Pelvis:There is no mass or lymphadenopathy. No abnormal fluid  collections are present.                Impression    Impression: Acute intra-abdominal findings. There is no evidence of a  ureteral calculus or hydronephrosis at this time.      ASH FOX MD         SYSTEM ID:  [id number]

## 2022-06-18 NOTE — PROGRESS NOTES
Impulsive at times.  Sets off bed alarm.  Drinking fluids but refusing breakfast.  Unsteady gait to BR.  Took his morning vitamins and ativan 1 mg PO.  CIWA 12.  He asked what is wrong with him and where he was at.  Refused nicotine patch saying it isn't working.  Leg band on.  Darkened the room.  Bed alarm on. Will let him sleep.        SAFETY CHECKLIST  ID Bands and Risk clasps correct and in place (DNR, Fall risk, Allergy, Latex, Limb):  Yes  All Lines Reconciled and labeled correctly: Yes  Whiteboard updated:Yes  Environmental interventions (bed/chair alarm on, call light, side rails, restraints, sitter....): Yes  Verify Tele #:

## 2022-06-19 ENCOUNTER — APPOINTMENT (OUTPATIENT)
Dept: CT IMAGING | Facility: OTHER | Age: 72
DRG: 673 | End: 2022-06-19
Attending: FAMILY MEDICINE
Payer: MEDICARE

## 2022-06-19 LAB
ANION GAP SERPL CALCULATED.3IONS-SCNC: 12 MMOL/L (ref 3–14)
BUN SERPL-MCNC: 22 MG/DL (ref 7–25)
CALCIUM SERPL-MCNC: 8.7 MG/DL (ref 8.6–10.3)
CHLORIDE BLD-SCNC: 106 MMOL/L (ref 98–107)
CO2 SERPL-SCNC: 21 MMOL/L (ref 21–31)
CREAT SERPL-MCNC: 1.42 MG/DL (ref 0.7–1.3)
ERYTHROCYTE [DISTWIDTH] IN BLOOD BY AUTOMATED COUNT: 12.9 % (ref 10–15)
GFR SERPL CREATININE-BSD FRML MDRD: 52 ML/MIN/1.73M2
GLUCOSE BLD-MCNC: 66 MG/DL (ref 70–105)
HCT VFR BLD AUTO: 43.6 % (ref 40–53)
HGB BLD-MCNC: 15 G/DL (ref 13.3–17.7)
INR PPP: 1.14 (ref 0.85–1.15)
LACTATE SERPL-SCNC: 0.8 MMOL/L (ref 0.7–2)
MCH RBC QN AUTO: 36.2 PG (ref 26.5–33)
MCHC RBC AUTO-ENTMCNC: 34.4 G/DL (ref 31.5–36.5)
MCV RBC AUTO: 105 FL (ref 78–100)
PLATELET # BLD AUTO: 237 10E3/UL (ref 150–450)
POTASSIUM BLD-SCNC: 4.4 MMOL/L (ref 3.5–5.1)
PROCALCITONIN SERPL-MCNC: <0.5 NG/ML
RBC # BLD AUTO: 4.14 10E6/UL (ref 4.4–5.9)
SODIUM SERPL-SCNC: 139 MMOL/L (ref 134–144)
WBC # BLD AUTO: 6.6 10E3/UL (ref 4–11)

## 2022-06-19 PROCEDURE — 85014 HEMATOCRIT: CPT | Performed by: FAMILY MEDICINE

## 2022-06-19 PROCEDURE — 200N000001 HC R&B ICU

## 2022-06-19 PROCEDURE — 83605 ASSAY OF LACTIC ACID: CPT | Performed by: FAMILY MEDICINE

## 2022-06-19 PROCEDURE — 99232 SBSQ HOSP IP/OBS MODERATE 35: CPT | Performed by: FAMILY MEDICINE

## 2022-06-19 PROCEDURE — 36415 COLL VENOUS BLD VENIPUNCTURE: CPT | Performed by: FAMILY MEDICINE

## 2022-06-19 PROCEDURE — 258N000003 HC RX IP 258 OP 636: Performed by: FAMILY MEDICINE

## 2022-06-19 PROCEDURE — 250N000013 HC RX MED GY IP 250 OP 250 PS 637: Performed by: FAMILY MEDICINE

## 2022-06-19 PROCEDURE — 250N000011 HC RX IP 250 OP 636: Performed by: FAMILY MEDICINE

## 2022-06-19 PROCEDURE — 70450 CT HEAD/BRAIN W/O DYE: CPT | Mod: TC

## 2022-06-19 PROCEDURE — 82310 ASSAY OF CALCIUM: CPT | Performed by: FAMILY MEDICINE

## 2022-06-19 PROCEDURE — 85610 PROTHROMBIN TIME: CPT | Performed by: FAMILY MEDICINE

## 2022-06-19 PROCEDURE — 84145 PROCALCITONIN (PCT): CPT | Performed by: FAMILY MEDICINE

## 2022-06-19 RX ORDER — FUROSEMIDE 10 MG/ML
20 INJECTION INTRAMUSCULAR; INTRAVENOUS ONCE
Status: COMPLETED | OUTPATIENT
Start: 2022-06-19 | End: 2022-06-19

## 2022-06-19 RX ORDER — FLUMAZENIL 0.1 MG/ML
0.2 INJECTION, SOLUTION INTRAVENOUS
Status: DISCONTINUED | OUTPATIENT
Start: 2022-06-19 | End: 2022-06-27

## 2022-06-19 RX ORDER — DIAZEPAM 10 MG/2ML
5-10 INJECTION, SOLUTION INTRAMUSCULAR; INTRAVENOUS EVERY 30 MIN PRN
Status: DISCONTINUED | OUTPATIENT
Start: 2022-06-19 | End: 2022-06-27

## 2022-06-19 RX ORDER — DIAZEPAM 5 MG
10 TABLET ORAL EVERY 30 MIN PRN
Status: DISCONTINUED | OUTPATIENT
Start: 2022-06-19 | End: 2022-06-27

## 2022-06-19 RX ADMIN — THIAMINE HCL TAB 100 MG 100 MG: 100 TAB at 09:07

## 2022-06-19 RX ADMIN — DIAZEPAM 10 MG: 10 INJECTION, SOLUTION INTRAMUSCULAR; INTRAVENOUS at 20:24

## 2022-06-19 RX ADMIN — SODIUM CHLORIDE: 9 INJECTION, SOLUTION INTRAVENOUS at 19:23

## 2022-06-19 RX ADMIN — DIAZEPAM 10 MG: 10 INJECTION, SOLUTION INTRAMUSCULAR; INTRAVENOUS at 15:59

## 2022-06-19 RX ADMIN — FUROSEMIDE 20 MG: 10 INJECTION, SOLUTION INTRAVENOUS at 09:02

## 2022-06-19 RX ADMIN — HYALURONIDASE (HUMAN RECOMBINANT) 150 UNITS: 150 INJECTION, SOLUTION SUBCUTANEOUS at 17:28

## 2022-06-19 RX ADMIN — LORAZEPAM 2 MG: 2 INJECTION, SOLUTION INTRAMUSCULAR; INTRAVENOUS at 10:20

## 2022-06-19 RX ADMIN — HALOPERIDOL LACTATE 2.5 MG: 5 INJECTION, SOLUTION INTRAMUSCULAR at 20:51

## 2022-06-19 RX ADMIN — DIAZEPAM 10 MG: 10 INJECTION, SOLUTION INTRAMUSCULAR; INTRAVENOUS at 21:00

## 2022-06-19 RX ADMIN — FOLIC ACID 1 MG: 1 TABLET ORAL at 09:07

## 2022-06-19 RX ADMIN — LORAZEPAM 2 MG: 1 TABLET ORAL at 00:30

## 2022-06-19 RX ADMIN — Medication 1 TABLET: at 09:07

## 2022-06-19 RX ADMIN — CLONIDINE HYDROCHLORIDE 0.1 MG: 0.1 TABLET ORAL at 15:57

## 2022-06-19 RX ADMIN — SODIUM CHLORIDE: 9 INJECTION, SOLUTION INTRAVENOUS at 01:13

## 2022-06-19 RX ADMIN — DIAZEPAM 10 MG: 10 INJECTION, SOLUTION INTRAMUSCULAR; INTRAVENOUS at 19:25

## 2022-06-19 RX ADMIN — DIAZEPAM 10 MG: 10 INJECTION, SOLUTION INTRAMUSCULAR; INTRAVENOUS at 18:38

## 2022-06-19 RX ADMIN — LORAZEPAM 2 MG: 2 INJECTION, SOLUTION INTRAMUSCULAR; INTRAVENOUS at 03:46

## 2022-06-19 RX ADMIN — DIAZEPAM 5 MG: 10 INJECTION, SOLUTION INTRAMUSCULAR; INTRAVENOUS at 21:34

## 2022-06-19 RX ADMIN — PANTOPRAZOLE SODIUM 40 MG: 40 TABLET, DELAYED RELEASE ORAL at 09:07

## 2022-06-19 ASSESSMENT — ACTIVITIES OF DAILY LIVING (ADL)
ADLS_ACUITY_SCORE: 54
ADLS_ACUITY_SCORE: 50
ADLS_ACUITY_SCORE: 54
ADLS_ACUITY_SCORE: 50
ADLS_ACUITY_SCORE: 50
ADLS_ACUITY_SCORE: 45
ADLS_ACUITY_SCORE: 49
ADLS_ACUITY_SCORE: 44
ADLS_ACUITY_SCORE: 54
ADLS_ACUITY_SCORE: 44
ADLS_ACUITY_SCORE: 49
ADLS_ACUITY_SCORE: 50

## 2022-06-19 NOTE — PROGRESS NOTES
Patient anxious and agitated throughout the night.  CIWA scale completed as ordered. Patient insistent on getting out of bed to use bathroom but unable sit up in bed unassisted. Patient demanded to use bathroom, refusing urinal and was assisted to standing position with 2 assist and gait belt. Patient too unsteady and RN requested patient sit back down on the bed before he fell. Patient refused to sit on bed. RN and aide assisted patient back to bed as patient was becoming increasingly unsteady and was being held up by RN via gait belt.

## 2022-06-19 NOTE — PLAN OF CARE
"  Problem: Plan of Care - These are the overarching goals to be used throughout the patient stay.    Goal: Plan of Care Review/Shift Note  Description: The Plan of Care Review/Shift note should be completed every shift.  The Outcome Evaluation is a brief statement about your assessment that the patient is improving, declining, or no change.  This information will be displayed automatically on your shift note.  Outcome: Ongoing, Progressing  Flowsheets (Taken 6/19/2022 0129)  Plan of Care Reviewed With: patient  Overall Patient Progress: no change  Goal: Patient-Specific Goal (Individualized)  Description: You can add care plan individualizations to a care plan. Examples of Individualization might be:  \"Parent requests to be called daily at 9am for status\", \"I have a hard time hearing out of my right ear\", or \"Do not touch me to wake me up as it startles me\".  Outcome: Ongoing, Progressing  Flowsheets (Taken 6/19/2022 0129)  Individualized Care Needs: none stated  Anxieties, Fears or Concerns: non stated  Patient-Specific Goals (Include Timeframe): non stated  Goal: Absence of Hospital-Acquired Illness or Injury  Outcome: Ongoing, Progressing  Goal: Optimal Comfort and Wellbeing  Outcome: Ongoing, Progressing  Goal: Readiness for Transition of Care  Outcome: Ongoing, Progressing  Flowsheets (Taken 6/19/2022 0129)  Anticipated Changes Related to Illness: inability to care for self  Transportation Anticipated: family or friend will provide  Concerns to be Addressed:   substance/tobacco abuse/use   discharge planning  Intervention: Mutually Develop Transition Plan  Flowsheets (Taken 6/19/2022 0129)  Anticipated Changes Related to Illness: inability to care for self  Transportation Anticipated: family or friend will provide  Concerns to be Addressed:   substance/tobacco abuse/use   discharge planning  Equipment Currently Used at Home: none     Problem: Fall Injury Risk  Goal: Absence of Fall and Fall-Related " Injury  Outcome: Ongoing, Progressing     Problem: Behavioral Health Comorbidity  Goal: Maintenance of Behavioral Health Symptom Control  Outcome: Ongoing, Progressing     Problem: Alcohol Withdrawal  Goal: Alcohol Withdrawal Symptom Control  Outcome: Ongoing, Progressing   Goal Outcome Evaluation:    Plan of Care Reviewed With: patient     Overall Patient Progress: no change

## 2022-06-19 NOTE — PLAN OF CARE
"Patient is restless and impulsive. Continues to jump out of bed. He thinks he is in retirement and stated that he was seeing \"things\" on the wall. He jumped out of bed and pulled out his IV. [facility] staff attempted to redirect resident and reassure that he is in the hospital and that staff are here to help. Patient is covered in sweat from top of head to chest. Visible tremors noted in both hands. Staff were able to get patient cleaned up and bed changed. Bleeding from IV site has stopped. PRN Zyprexa given per CIWA protocol. Aurora Hdz RN on 6/18/2022 at 10:49 PM    "

## 2022-06-19 NOTE — PROGRESS NOTES
[facility]    Medicine Progress Note - Hospitalist Service    Date of Admission:  6/17/2022    Assessment & Plan                          Antonio Rutherford is a 72 year old male admitted on 6/17/2022. He presented from the [facility] for concerns of possible kidney stone versus sepsis.    Patient was admitted to the [facility] 7 days PTA.  He is a daily drinker of whiskey and was treated for alcohol withdrawal at the [facility] and then started on Bactrim for possible UTI 4 days PTA.  He has had extremely poor oral intake.    He was found in the emergency department to have acute kidney injury likely due to ATN secondary to Bactrim.  He is clinically dehydrated and likely still with a component of alcohol withdrawal.  Also has been given ibuprofen.  We have not found evidence of sepsis at this time.    Patient was admitted treated with thiamine, folate and IV fluids, strict I's and O's and close monitoring.    Patient's renal function has improved with IV hydration.  He has continued to exhibit signs and symptoms of alcohol withdrawal and has required 6 mg of IV Ativan over the last 24 hours as well as 10 mg of Zyprexa.    Active Problems:    Alcohol withdrawal (H)    Assessment: Stable.  Currently somewhat somnolent since 4 AM dose of Ativan.    Plan: CIWA protocol.  Ativan.  Thiamine and folate.      ATN (acute tubular necrosis) (H)    Assessment: Bactrim and ibuprofen potentially causing ATN.  Also significant prerenal component as evidenced by improvement with hydration.  Now slightly hypervolemic.    Plan: Patient has been incontinent and urine unmeasured but he has been urinating frequently and what seems like large volumes.  We will continue to encourage I's/O's but I think the risks of Escobedo catheter outweighs the benefit given his improvement in renal function.      Dehydration    Assessment: Resolved.  Mediocre oral intake.    Plan: Continue to encourage oral hydration.  Reduce IV rate to 20 mL/h.  Give 1  "dose of IV Lasix now due to respiratory crackles without hypoxia      CHARLOTTE (acute kidney injury) (H)    Assessment: Likely multifactorial with prerenal azotemia and possible ATN.    Plan: Recheck in a.m.  Hold all NSAIDs.           Diet: Combination Diet Regular Diet Adult    DVT Prophylaxis: Pneumatic Compression Devices  Escobedo Catheter: Not present  Central Lines: None  Cardiac Monitoring: ACTIVE order. Indication: Electrolyte Imbalance (24 hours)- Magnesium <1.3 mg/ml; Potassium < =2.8 or > 5.5 mg/ml  Code Status: Full Code      Disposition Plan   Expected Discharge:  2 to 3 days  Anticipated discharge location:  Awaiting care coordination huddle  Delays:    Back to [facility] versus SNF       The patient's care was discussed with the patient and his son.    Karl Menjivar MD  Hospitalist Service  [facility]  Securely message with the Vocera Web Console (learn more here)  Text page via [employer] Paging/Directory         Clinically Significant Risk Factors Present on Admission             # Overweight: Estimated body mass index is 28.52 kg/m  as calculated from the following:    Height as of this encounter: 1.778 m (5' 10\").    Weight as of this encounter: 90.2 kg (198 lb 12.8 oz).      ______________________________________________________________________    Interval History   Patient tells me that he feels \"fine\".  He knows he is in the hospital and he knows he was in [facility].  He is not sure why he is in the hospital but with prompting is able to tell me that he had pain and now has some problem with his kidneys.  Currently he denies any pain.  He has limited insight as to the severity of his alcohol issue.  Nursing notes reviewed.  Patient did receive Zyprexa overnight due to agitation and has required more frequent Ativan dosing over the past 16 hours.    Data reviewed today: I reviewed all medications, new labs and imaging results over the last 24 hours. I personally reviewed no images or EKG's " today.    Physical Exam   Vital Signs: Temp: (!) 96.6  F (35.9  C) Temp src: Tympanic BP: 131/75 Pulse: 89   Resp: 18 SpO2: 92 % O2 Device: None (Room air)    Weight: 198 lbs 12.8 oz  Constitutional: Patient is resting comfortably.  He is easily aroused but remains somewhat groggy.  Respiratory: Bilateral crackles at the posterior bases  Cardiovascular: Regular rate and rhythm.  No murmurs rubs or gallops.  No definite JVD.  Trace bilateral lower extremity edema  GI: Abdomen soft, nontender.  Bowel sounds heard in all quadrants  Musculoskeletal: Generalized weakness with no focal deficits  Neuropsychiatric: Alert and oriented to person and place, situation with prompting.  Not alert to time.    Data   Recent Labs   Lab 06/19/22  0806 06/18/22  0609 06/17/22  1618   WBC  --  5.2 7.3   HGB  --  14.5 15.6   MCV  --  105* 102*   PLT  --  234 283    140 137   POTASSIUM 4.4 4.1 3.8   CHLORIDE 106 106 99   CO2 21 21 24   BUN 22 36* 41*   CR 1.42* 2.06* 2.72*   ANIONGAP 12 13 14   PAWAN 8.7 8.6 9.5   GLC 66* 69* 80   ALBUMIN  --  3.6 4.4   PROTTOTAL  --  6.1* 7.8   BILITOTAL  --  0.9 1.0   ALKPHOS  --  40 46   ALT  --  18 24   AST  --  17 20     No results found for this or any previous visit (from the past 24 hour(s)).

## 2022-06-19 NOTE — PLAN OF CARE
SAFETY CHECKLIST  ID Bands and Risk clasps correct and in place (DNR, Fall risk, Allergy, Latex, Limb):  Yes  All Lines Reconciled and labeled correctly: Yes  Whiteboard updated:Yes  Environmental interventions (bed/chair alarm on, call light, side rails, restraints, sitter....): Yes  Verify Tele #: 1

## 2022-06-19 NOTE — PHARMACY
Pharmacy - Transfer Medication Reconciliation     The patient's transfer medication orders have been compared to the medication administration record and to the Prior to Admissions Medications list - any noted discrepancies were resolved with the MD.     Thank you. Pharmacy will continue to monitor.     Kuldeep Hanson [facility] ....................  6/19/2022   5:45 PM

## 2022-06-19 NOTE — PROGRESS NOTES
Report received from RONALDO Alvarez. Transferred via bed to room 915. Assist of 4 for transfer. Tolerated well. Denies any pain. Seizure pads placed on bed. Bed alarm on. Will continue to monitor.

## 2022-06-19 NOTE — PROGRESS NOTES
Patient was getting very restless on Med Surg.  2  Mg Ativan was given IV.  Lasix was given and archer was placed as patient is very impulsive when needing to urinate.  Archer captured 1250 ml urine.  Transferred to ICU about 1425 as patient needed one on one care.

## 2022-06-19 NOTE — PROGRESS NOTES
Patient has continued to have more agitation and worsening confusion as the day goes on.  Ativan does seem to be effective with agitation.  Patient denies pain.  He is oriented to person and place.    Cranial nerves II through XII intact.  Reflexes symmetric.  Patient is able to follow commands and cerebellar function is largely normal with fine tremor.  He is able to move all extremities.  He does have intermittent delirium and forgets he is in the hospital and tries to get out of bed.    Crackles in lung fields have improved.  No hypoxia.    Patient will continue to require higher doses of Ativan most likely.  I talked with his son who reports that he typically drinks 4 L of whiskey per week and has likely had severe withdrawal in the past.      Patient will be transferred to the ICU for closer monitoring and anticipation of need of higher dose of Ativan for withdrawal.    We will recheck infectious markers and if elevated start Rocephin for possible UTI although this does not seem consistent with sepsis and his UA was more suggestive of contaminant.    Will also obtain CT of head.    Karl Menjivar MD

## 2022-06-19 NOTE — PROGRESS NOTES
Groggy today.  Speech is hard to understand.  Too weak to transfer to BR today.  Lasix for crackles.  IV fluids decreased.

## 2022-06-20 ENCOUNTER — APPOINTMENT (OUTPATIENT)
Dept: GENERAL RADIOLOGY | Facility: OTHER | Age: 72
DRG: 673 | End: 2022-06-20
Attending: SURGERY
Payer: MEDICARE

## 2022-06-20 LAB
ANION GAP SERPL CALCULATED.3IONS-SCNC: 14 MMOL/L (ref 3–14)
ANION GAP SERPL CALCULATED.3IONS-SCNC: 17 MMOL/L (ref 3–14)
BACTERIA UR CULT: NO GROWTH
BUN SERPL-MCNC: 20 MG/DL (ref 7–25)
BUN SERPL-MCNC: 21 MG/DL (ref 7–25)
CALCIUM SERPL-MCNC: 8.5 MG/DL (ref 8.6–10.3)
CALCIUM SERPL-MCNC: 9.4 MG/DL (ref 8.6–10.3)
CHLORIDE BLD-SCNC: 103 MMOL/L (ref 98–107)
CHLORIDE BLD-SCNC: 107 MMOL/L (ref 98–107)
CO2 SERPL-SCNC: 11 MMOL/L (ref 21–31)
CO2 SERPL-SCNC: 23 MMOL/L (ref 21–31)
CREAT SERPL-MCNC: 1.28 MG/DL (ref 0.7–1.3)
CREAT SERPL-MCNC: 1.42 MG/DL (ref 0.7–1.3)
GFR SERPL CREATININE-BSD FRML MDRD: 52 ML/MIN/1.73M2
GFR SERPL CREATININE-BSD FRML MDRD: 59 ML/MIN/1.73M2
GLUCOSE BLD-MCNC: 105 MG/DL (ref 70–105)
GLUCOSE BLD-MCNC: 83 MG/DL (ref 70–105)
GLUCOSE BLDC GLUCOMTR-MCNC: 124 MG/DL (ref 70–99)
GLUCOSE BLDC GLUCOMTR-MCNC: 71 MG/DL (ref 70–99)
LACTATE SERPL-SCNC: 1 MMOL/L (ref 0.7–2)
POTASSIUM BLD-SCNC: 4.4 MMOL/L (ref 3.5–5.1)
POTASSIUM BLD-SCNC: 5.1 MMOL/L (ref 3.5–5.1)
SODIUM SERPL-SCNC: 135 MMOL/L (ref 134–144)
SODIUM SERPL-SCNC: 140 MMOL/L (ref 134–144)

## 2022-06-20 PROCEDURE — 80048 BASIC METABOLIC PNL TOTAL CA: CPT | Performed by: FAMILY MEDICINE

## 2022-06-20 PROCEDURE — 36415 COLL VENOUS BLD VENIPUNCTURE: CPT | Performed by: FAMILY MEDICINE

## 2022-06-20 PROCEDURE — 250N000011 HC RX IP 250 OP 636: Performed by: FAMILY MEDICINE

## 2022-06-20 PROCEDURE — 83605 ASSAY OF LACTIC ACID: CPT | Performed by: FAMILY MEDICINE

## 2022-06-20 PROCEDURE — 258N000001 HC RX 258: Performed by: FAMILY MEDICINE

## 2022-06-20 PROCEDURE — 99232 SBSQ HOSP IP/OBS MODERATE 35: CPT | Performed by: FAMILY MEDICINE

## 2022-06-20 PROCEDURE — 250N000013 HC RX MED GY IP 250 OP 250 PS 637: Performed by: FAMILY MEDICINE

## 2022-06-20 PROCEDURE — 250N000011 HC RX IP 250 OP 636: Performed by: SURGERY

## 2022-06-20 PROCEDURE — 200N000001 HC R&B ICU

## 2022-06-20 PROCEDURE — 71045 X-RAY EXAM CHEST 1 VIEW: CPT | Mod: TC

## 2022-06-20 RX ORDER — FUROSEMIDE 10 MG/ML
20 INJECTION INTRAMUSCULAR; INTRAVENOUS ONCE
Status: COMPLETED | OUTPATIENT
Start: 2022-06-20 | End: 2022-06-20

## 2022-06-20 RX ADMIN — NICOTINE 1 PATCH: 14 PATCH, EXTENDED RELEASE TRANSDERMAL at 09:25

## 2022-06-20 RX ADMIN — DEXTROSE AND SODIUM CHLORIDE: 5; 450 INJECTION, SOLUTION INTRAVENOUS at 18:47

## 2022-06-20 RX ADMIN — FUROSEMIDE 20 MG: 10 INJECTION, SOLUTION INTRAVENOUS at 03:07

## 2022-06-20 RX ADMIN — DEXTROSE AND SODIUM CHLORIDE: 5; 450 INJECTION, SOLUTION INTRAVENOUS at 08:10

## 2022-06-20 RX ADMIN — HALOPERIDOL LACTATE 5 MG: 5 INJECTION, SOLUTION INTRAMUSCULAR at 05:49

## 2022-06-20 RX ADMIN — DIAZEPAM 5 MG: 10 INJECTION, SOLUTION INTRAMUSCULAR; INTRAVENOUS at 00:29

## 2022-06-20 RX ADMIN — DIAZEPAM 10 MG: 10 INJECTION, SOLUTION INTRAMUSCULAR; INTRAVENOUS at 05:49

## 2022-06-20 RX ADMIN — DIAZEPAM 10 MG: 10 INJECTION, SOLUTION INTRAMUSCULAR; INTRAVENOUS at 02:13

## 2022-06-20 ASSESSMENT — ACTIVITIES OF DAILY LIVING (ADL)
ADLS_ACUITY_SCORE: 54
ADLS_ACUITY_SCORE: 50
ADLS_ACUITY_SCORE: 54
ADLS_ACUITY_SCORE: 54
ADLS_ACUITY_SCORE: 50
ADLS_ACUITY_SCORE: 54
ADLS_ACUITY_SCORE: 50
ADLS_ACUITY_SCORE: 50

## 2022-06-20 NOTE — PROGRESS NOTES
[facility]    Medicine Progress Note - Hospitalist Service    Date of Admission:  6/17/2022    Assessment & Plan                 Antonio Rutherford is a 72 year old male admitted on 6/17/2022. He presented from the assisted for concerns of possible kidney stone versus sepsis.    Patient was admitted to the assisted 7 days PTA.  He is a daily drinker of whiskey and was treated for alcohol withdrawal at the assisted and then started on Bactrim for possible UTI 4 days PTA.  He has had extremely poor oral intake.    He was found in the emergency department to have acute kidney injury likely due to ATN secondary to Bactrim.  He is clinically dehydrated and likely still with a component of alcohol withdrawal.  Also has been given ibuprofen.  We have not found evidence of sepsis at this time.    Patient was admitted treated with thiamine, folate and IV fluids, strict I's and O's and close monitoring.    Patient's renal function has improved with IV hydration.  He has continued to exhibit signs and symptoms of alcohol withdrawal and has required 25 mg of IV Diazepam over the last 24 hours as well as 5 mg of Haldol.    Active Problems:    Alcohol withdrawal (H)    Assessment: Stable with no significant change in condition since yesterday.    Plan: CIWA protocol.  Diazepam.  Thiamine and folate.      ATN (acute tubular necrosis) (H)    Assessment: Bactrim and ibuprofen potentially causing ATN.  Also significant prerenal component as evidenced by improvement with hydration.  Now slightly hypervolemic.    Plan: Good urine output, tolerating archer.  Improved renal function.      Dehydration    Assessment: Resolved.  Mediocre oral intake.    Plan: D5 1/2NS at 75ml/hr.  Appears euvolemic now.      CHARLOTTE (acute kidney injury) (H)    Assessment: Likely multifactorial with prerenal azotemia and possible ATN.    Plan: Recheck in a.m.  Hold all NSAIDs.             Diet: Combination Diet Regular Diet Adult    DVT Prophylaxis:  "Pneumatic Compression Devices  Escobedo Catheter: PRESENT, indication: Strict 1-2 Hour I&O  Central Lines: None  Cardiac Monitoring: ACTIVE order. Indication: Electrolyte Imbalance (24 hours)- Magnesium <1.3 mg/ml; Potassium < =2.8 or > 5.5 mg/ml  Code Status: Full Code      Disposition Plan   Expected Discharge:  several days   Anticipated discharge location:  Awaiting care coordination huddle  Delays:            The patient's care was discussed with the Patient and Patient's Family.    Karl Menjivar MD  Hospitalist Service  [facility]  Securely message with the Vocera Web Console (learn more here)  Text page via [employer] Paging/Directory         Clinically Significant Risk Factors Present on Admission             # Overweight: Estimated body mass index is 28.52 kg/m  as calculated from the following:    Height as of this encounter: 1.778 m (5' 10\").    Weight as of this encounter: 90.2 kg (198 lb 12.8 oz).  # Severe Malnutrition: based on nutrition assessment     ______________________________________________________________________    Interval History   And is somnolent but does arouse with questions.  I can understand what he is saying and he is answers appear appropriate.  He tells me he is in no pain.  Specifically no chest pain, no abdominal pain.  He does not feel short of breath.  He is tired.  Feels a little anxious.  No fevers overnight.    Data reviewed today: I reviewed all medications, new labs and imaging results over the last 24 hours. I personally reviewed the head CT image(s) showing No intracranial pathology.  Chest x-ray shows no infiltrate.    Physical Exam   Vital Signs: Temp: 99.1  F (37.3  C) Temp src: Temporal BP: (!) 157/104 Pulse: 101   Resp: (!) 32 SpO2: 95 % O2 Device: Nasal cannula Oxygen Delivery: 2 LPM  Weight: 198 lbs 12.8 oz  Constitutional: Somnolent but easily arousable.  Follows commands and answers questions appropriately  Respiratory: Lung fields are clear " however patient does have upper airway congestion and loud snoring.  Cardiovascular: Regular rate and rhythm.  No murmurs rubs or gallops.  GI: Abdomen soft, nontender  Musculoskeletal: Moves all extremities  Neuropsychiatric: Oriented to person and with prompting place but not situation or time    Data   Recent Labs   Lab 06/20/22  1206 06/20/22  0905 06/20/22  0655 06/20/22  0441 06/19/22  1143 06/19/22  1139 06/19/22  0806 06/18/22  0609 06/17/22  1618   WBC  --   --   --   --  6.6  --   --  5.2 7.3   HGB  --   --   --   --  15.0  --   --  14.5 15.6   MCV  --   --   --   --  105*  --   --  105* 102*   PLT  --   --   --   --  237  --   --  234 283   INR  --   --   --   --   --  1.14  --   --   --    NA  --  140  --  135  --   --  139 140 137   POTASSIUM  --  4.4  --  5.1  --   --  4.4 4.1 3.8   CHLORIDE  --  103  --  107  --   --  106 106 99   CO2  --  23  --  11*  --   --  21 21 24   BUN  --  20  --  21  --   --  22 36* 41*   CR  --  1.42*  --  1.28  --   --  1.42* 2.06* 2.72*   ANIONGAP  --  14  --  17*  --   --  12 13 14   PAWAN  --  9.4  --  8.5*  --   --  8.7 8.6 9.5   * 105 71 83  --   --  66* 69* 80   ALBUMIN  --   --   --   --   --   --   --  3.6 4.4   PROTTOTAL  --   --   --   --   --   --   --  6.1* 7.8   BILITOTAL  --   --   --   --   --   --   --  0.9 1.0   ALKPHOS  --   --   --   --   --   --   --  40 46   ALT  --   --   --   --   --   --   --  18 24   AST  --   --   --   --   --   --   --  17 20     Recent Results (from the past 24 hour(s))   CT Head w/o Contrast    Narrative    PROCEDURE INFORMATION:   Exam: CT Head Without Contrast   Exam date and time: 6/19/2022 2:15 PM   Age: 72 years old   Clinical indication: Altered mental status/memory loss and other: ETOH     TECHNIQUE:   Imaging protocol: Computed tomography of the head without contrast.   Radiation optimization: All CT scans at this facility use at least one of these   dose optimization techniques: automated exposure control; mA  and/or kV   adjustment per patient size (includes targeted exams where dose is matched to   clinical indication); or iterative reconstruction.     COMPARISON:   No relevant prior studies available.     FINDINGS:   Brain:  Atrophy. No hemorrhage.  Periventricular white matter changes   consistent with chronic small vessel disease. No mass effect.   Cerebral ventricles: No ventriculomegaly.   Paranasal sinuses: Visualized sinuses are unremarkable. No fluid levels.   Mastoid air cells: Visualized mastoid air cells are well aerated.     Bones/joints: Unremarkable. No acute fracture.   Soft tissues: Unremarkable.       Impression    IMPRESSION:   No acute intracranial abnormality.     THIS DOCUMENT HAS BEEN ELECTRONICALLY SIGNED BY TRACY INIGUEZ MD   XR Chest Port 1 View    Narrative    PROCEDURE INFORMATION:   Exam: XR Chest   Exam date and time: 6/20/2022 2:56 AM   Age: 72 years old   Clinical indication: Wheezing; Additional info: Coarse lung sounds     TECHNIQUE:   Imaging protocol: Radiologic exam of the chest.   Views: 1 view.     COMPARISON:   CR XR CHEST PORT 1 VIEW 6/17/2022 5:08 PM     FINDINGS:   Lungs: No focal consolidation or pulmonary edema.   Pleural spaces: No pleural effusion. No pneumothorax.   Heart/Mediastinum:  Likely unchanged allowing for differences in technique.    Bones/joints: No acute findings. No acute fracture.       Impression    IMPRESSION:   No evidence of acute cardiopulmonary disease.     THIS DOCUMENT HAS BEEN ELECTRONICALLY SIGNED BY MOMO CLAY MD

## 2022-06-20 NOTE — PROGRESS NOTES
Patient agitated and restless at the beginning of shift.  Patient is pulling at BP cuff and pulse ox.  Patient is orientated to self only and believes he is still in [facility].  Patient is calling out to unknown persons not in the room.  Continuing CIWA scoring and attempting to reorient patient

## 2022-06-20 NOTE — PROGRESS NOTES
Patient is currently seeing unknown amount of people in his hospital room that he would like to get out of bed and join.  Patient is still agitated, trying to remove gown, BP cuff and pulse ox.  Patient would like a whiskey and water.  Tele-ICU updated on patients progress

## 2022-06-20 NOTE — PLAN OF CARE
PT/OT on hold as pt not able to participate in therapies at this time. Will remain involved and initiate therapy when appropriate.     Rosenda Gonzalez, OT on 6/20/2022 at 2:53 PM

## 2022-06-20 NOTE — PLAN OF CARE
Goal Outcome Evaluation:      Patient resting comfortably. No behaviors. Continue to monitor.

## 2022-06-20 NOTE — PROGRESS NOTES
Clinical Nutrition / Initial Assessment     Reason for Assessment:  Screened at nutritional risk due to:  Eating poorly due to decreased appetite-refusing to eat    Assessment:   Client History:  Pt with alcohol withdrawal, comes from [facility] and has been there since 6/10. During this time, he has been refusing to eat. Drinking a little. Supplements likely to be refused as well but recommend at least offering them until he starts to eat. Has had ~30# wt loss in past 3 years, no information in the mean time to compare.   Diet Order:  Regular  Oral Intake:  Refusing to eat  Weight:   Wt Readings from Last 10 Encounters:   06/18/22 90.2 kg (198 lb 12.8 oz)   04/18/19 104.8 kg (231 lb)   03/20/19 104.3 kg (230 lb)   03/12/19 104.3 kg (230 lb)   09/12/17 99.8 kg (220 lb)   06/18/15 100.7 kg (222 lb)   06/16/15 100.2 kg (221 lb)   11/29/12 90.7 kg (200 lb)     Malnutrition Criteria:  (Need to have 2 indicators to qualify recommendation)  Energy Intake:  Acute Severe: </= 50% of estimated energy requirement for >/= 5 days  Interpretation of Weight Loss:  No significant weight loss in past year noted per chart review and lack of information in the time frame  Physical Findings:  No significant findings  Reduced  Strength:  noted weakness, likely reduced with current illness    Recommended Nutrition Diagnosis:   Severe Malnutrition in the context of acute illness or injury - based on AND/ASPEN Clinical Characterstics of Malnutrition May 2012      Nutrition Education: Nutrition education will be provided as appropriate.    Nutrition Diagnosis: Oral or Nutrition Support Intake:  inadequate energy intakes related to refusal to consume oral nutrition as evidenced by intakes 0% at meals, minimal fluid intake.    Intervention:  Nutrition Prescription:     Nutrition Intervention(s):Recommended general, healthful diet  1. Meals and Snacks: regular diet   2. Supplements: offer supplements as he allows    Nutrition Goal(s):  1. Pt  will consume 50% or more at meals  2. Pt will not have unplanned wt loss during hospitalization   3. Pt will tolerate diet as ordered    Monitoring and Evaluation:   Food Intake, tolerance, weight     Discharge Recommendation:   Nutrition Discharge Planning  encourage intakes, supplements if intakes are less than 50%    RD will reassess in within 1-5 days or sooner.  Leidy Joyce RD on 6/20/2022 at 9:54 AM

## 2022-06-21 ENCOUNTER — APPOINTMENT (OUTPATIENT)
Dept: CARDIOLOGY | Facility: OTHER | Age: 72
DRG: 673 | End: 2022-06-21
Attending: FAMILY MEDICINE
Payer: MEDICARE

## 2022-06-21 ENCOUNTER — ANESTHESIA EVENT (OUTPATIENT)
Dept: INTENSIVE CARE | Facility: OTHER | Age: 72
DRG: 673 | End: 2022-06-21
Payer: MEDICARE

## 2022-06-21 ENCOUNTER — APPOINTMENT (OUTPATIENT)
Dept: CT IMAGING | Facility: OTHER | Age: 72
DRG: 673 | End: 2022-06-21
Attending: FAMILY MEDICINE
Payer: MEDICARE

## 2022-06-21 ENCOUNTER — ANESTHESIA (OUTPATIENT)
Dept: INTENSIVE CARE | Facility: OTHER | Age: 72
DRG: 673 | End: 2022-06-21
Payer: MEDICARE

## 2022-06-21 ENCOUNTER — APPOINTMENT (OUTPATIENT)
Dept: GENERAL RADIOLOGY | Facility: OTHER | Age: 72
DRG: 673 | End: 2022-06-21
Attending: INTERNAL MEDICINE
Payer: MEDICARE

## 2022-06-21 ENCOUNTER — APPOINTMENT (OUTPATIENT)
Dept: GENERAL RADIOLOGY | Facility: OTHER | Age: 72
DRG: 673 | End: 2022-06-21
Attending: SURGERY
Payer: MEDICARE

## 2022-06-21 ENCOUNTER — APPOINTMENT (OUTPATIENT)
Dept: MRI IMAGING | Facility: OTHER | Age: 72
DRG: 673 | End: 2022-06-21
Attending: FAMILY MEDICINE
Payer: MEDICARE

## 2022-06-21 LAB
ALBUMIN SERPL-MCNC: 4 G/DL (ref 3.5–5.7)
ALBUMIN UR-MCNC: 70 MG/DL
ALP SERPL-CCNC: 41 U/L (ref 34–104)
ALT SERPL W P-5'-P-CCNC: 17 U/L (ref 7–52)
AMMONIA PLAS-SCNC: 18 UMOL/L (ref 16–53)
AMORPH CRY #/AREA URNS HPF: ABNORMAL /HPF
ANION GAP SERPL CALCULATED.3IONS-SCNC: 10 MMOL/L (ref 3–14)
APPEARANCE UR: ABNORMAL
AST SERPL W P-5'-P-CCNC: 33 U/L (ref 13–39)
BASE EXCESS BLDA CALC-SCNC: -1.4 MMOL/L (ref -9–1.8)
BASE EXCESS BLDA CALC-SCNC: -1.6 MMOL/L (ref -9–1.8)
BASE EXCESS BLDA CALC-SCNC: -2.4 MMOL/L (ref -9–1.8)
BILIRUB DIRECT SERPL-MCNC: 0.4 MG/DL (ref 0–0.2)
BILIRUB SERPL-MCNC: 1.6 MG/DL (ref 0.3–1)
BILIRUB UR QL STRIP: NEGATIVE
BUN SERPL-MCNC: 25 MG/DL (ref 7–25)
CALCIUM SERPL-MCNC: 9.6 MG/DL (ref 8.6–10.3)
CHLORIDE BLD-SCNC: 102 MMOL/L (ref 98–107)
CO2 SERPL-SCNC: 25 MMOL/L (ref 21–31)
COLOR UR AUTO: YELLOW
CREAT SERPL-MCNC: 1.53 MG/DL (ref 0.7–1.3)
D DIMER PPP FEU-MCNC: >=20 UG/ML FEU (ref 0–0.5)
ERYTHROCYTE [DISTWIDTH] IN BLOOD BY AUTOMATED COUNT: 13 % (ref 10–15)
GFR SERPL CREATININE-BSD FRML MDRD: 47 ML/MIN/1.73M2
GLUCOSE BLD-MCNC: 196 MG/DL (ref 70–105)
GLUCOSE UR STRIP-MCNC: NEGATIVE MG/DL
HCO3 BLD-SCNC: 23 MMOL/L (ref 21–28)
HCO3 BLD-SCNC: 24 MMOL/L (ref 21–28)
HCO3 BLD-SCNC: 26 MMOL/L (ref 21–28)
HCT VFR BLD AUTO: 46.3 % (ref 40–53)
HGB BLD-MCNC: 15.4 G/DL (ref 13.3–17.7)
HGB UR QL STRIP: ABNORMAL
KETONES UR STRIP-MCNC: NEGATIVE MG/DL
LACTATE SERPL-SCNC: 1.9 MMOL/L (ref 0.7–2)
LEUKOCYTE ESTERASE UR QL STRIP: NEGATIVE
LVEF ECHO: NORMAL
MCH RBC QN AUTO: 34.8 PG (ref 26.5–33)
MCHC RBC AUTO-ENTMCNC: 33.3 G/DL (ref 31.5–36.5)
MCV RBC AUTO: 105 FL (ref 78–100)
NITRATE UR QL: NEGATIVE
O2/TOTAL GAS SETTING VFR VENT: 2 %
O2/TOTAL GAS SETTING VFR VENT: 40 %
O2/TOTAL GAS SETTING VFR VENT: 50 %
PCO2 BLD: 36 MM HG (ref 35–45)
PCO2 BLD: 48 MM HG (ref 35–45)
PCO2 BLD: 51 MM HG (ref 35–45)
PH BLD: 7.31 [PH] (ref 7.35–7.45)
PH BLD: 7.31 [PH] (ref 7.35–7.45)
PH BLD: 7.41 [PH] (ref 7.35–7.45)
PH UR STRIP: 5.5 [PH] (ref 5–9)
PLATELET # BLD AUTO: 229 10E3/UL (ref 150–450)
PO2 BLD: 48 MM HG (ref 80–105)
PO2 BLD: 52 MM HG (ref 80–105)
PO2 BLD: 83 MM HG (ref 80–105)
POTASSIUM BLD-SCNC: 4.1 MMOL/L (ref 3.5–5.1)
PROCALCITONIN SERPL-MCNC: <0.5 NG/ML
PROT SERPL-MCNC: 7.5 G/DL (ref 6.4–8.9)
RBC # BLD AUTO: 4.42 10E6/UL (ref 4.4–5.9)
RBC URINE: >182 /HPF
SODIUM SERPL-SCNC: 137 MMOL/L (ref 134–144)
SP GR UR STRIP: <1 (ref 1–1.03)
TRI-PHOS CRY #/AREA URNS HPF: ABNORMAL /HPF
UROBILINOGEN UR STRIP-MCNC: NORMAL MG/DL
WBC # BLD AUTO: 13.8 10E3/UL (ref 4–11)
WBC URINE: 17 /HPF

## 2022-06-21 PROCEDURE — 999N000065 XR CHEST PORT 1 VIEW

## 2022-06-21 PROCEDURE — 94002 VENT MGMT INPAT INIT DAY: CPT

## 2022-06-21 PROCEDURE — 258N000003 HC RX IP 258 OP 636: Performed by: FAMILY MEDICINE

## 2022-06-21 PROCEDURE — 200N000001 HC R&B ICU

## 2022-06-21 PROCEDURE — 84145 PROCALCITONIN (PCT): CPT | Performed by: FAMILY MEDICINE

## 2022-06-21 PROCEDURE — 83605 ASSAY OF LACTIC ACID: CPT | Performed by: FAMILY MEDICINE

## 2022-06-21 PROCEDURE — 36556 INSERT NON-TUNNEL CV CATH: CPT | Performed by: SURGERY

## 2022-06-21 PROCEDURE — 250N000009 HC RX 250: Performed by: FAMILY MEDICINE

## 2022-06-21 PROCEDURE — 250N000011 HC RX IP 250 OP 636: Performed by: FAMILY MEDICINE

## 2022-06-21 PROCEDURE — 36600 WITHDRAWAL OF ARTERIAL BLOOD: CPT | Performed by: FAMILY MEDICINE

## 2022-06-21 PROCEDURE — 93005 ELECTROCARDIOGRAM TRACING: CPT

## 2022-06-21 PROCEDURE — 82248 BILIRUBIN DIRECT: CPT | Performed by: FAMILY MEDICINE

## 2022-06-21 PROCEDURE — 80053 COMPREHEN METABOLIC PANEL: CPT | Performed by: FAMILY MEDICINE

## 2022-06-21 PROCEDURE — 258N000001 HC RX 258: Performed by: FAMILY MEDICINE

## 2022-06-21 PROCEDURE — 82140 ASSAY OF AMMONIA: CPT | Performed by: FAMILY MEDICINE

## 2022-06-21 PROCEDURE — 255N000002 HC RX 255 OP 636: Performed by: FAMILY MEDICINE

## 2022-06-21 PROCEDURE — 99232 SBSQ HOSP IP/OBS MODERATE 35: CPT | Performed by: FAMILY MEDICINE

## 2022-06-21 PROCEDURE — 250N000013 HC RX MED GY IP 250 OP 250 PS 637: Performed by: INTERNAL MEDICINE

## 2022-06-21 PROCEDURE — 250N000009 HC RX 250: Performed by: INTERNAL MEDICINE

## 2022-06-21 PROCEDURE — 250N000009 HC RX 250: Performed by: NURSE ANESTHETIST, CERTIFIED REGISTERED

## 2022-06-21 PROCEDURE — 258N000003 HC RX IP 258 OP 636: Performed by: INTERNAL MEDICINE

## 2022-06-21 PROCEDURE — 5A1945Z RESPIRATORY VENTILATION, 24-96 CONSECUTIVE HOURS: ICD-10-PCS | Performed by: FAMILY MEDICINE

## 2022-06-21 PROCEDURE — 70553 MRI BRAIN STEM W/O & W/DYE: CPT

## 2022-06-21 PROCEDURE — 71275 CT ANGIOGRAPHY CHEST: CPT

## 2022-06-21 PROCEDURE — 250N000013 HC RX MED GY IP 250 OP 250 PS 637: Performed by: FAMILY MEDICINE

## 2022-06-21 PROCEDURE — 85027 COMPLETE CBC AUTOMATED: CPT | Performed by: FAMILY MEDICINE

## 2022-06-21 PROCEDURE — 0BH18EZ INSERTION OF ENDOTRACHEAL AIRWAY INTO TRACHEA, VIA NATURAL OR ARTIFICIAL OPENING ENDOSCOPIC: ICD-10-PCS | Performed by: FAMILY MEDICINE

## 2022-06-21 PROCEDURE — 93306 TTE W/DOPPLER COMPLETE: CPT

## 2022-06-21 PROCEDURE — A9575 INJ GADOTERATE MEGLUMI 0.1ML: HCPCS | Performed by: FAMILY MEDICINE

## 2022-06-21 PROCEDURE — 85379 FIBRIN DEGRADATION QUANT: CPT | Performed by: FAMILY MEDICINE

## 2022-06-21 PROCEDURE — 82803 BLOOD GASES ANY COMBINATION: CPT | Performed by: INTERNAL MEDICINE

## 2022-06-21 PROCEDURE — 87086 URINE CULTURE/COLONY COUNT: CPT | Performed by: FAMILY MEDICINE

## 2022-06-21 PROCEDURE — 999N000157 HC STATISTIC RCP TIME EA 10 MIN

## 2022-06-21 PROCEDURE — 36600 WITHDRAWAL OF ARTERIAL BLOOD: CPT | Performed by: INTERNAL MEDICINE

## 2022-06-21 PROCEDURE — 93010 ELECTROCARDIOGRAM REPORT: CPT | Performed by: INTERNAL MEDICINE

## 2022-06-21 PROCEDURE — 82803 BLOOD GASES ANY COMBINATION: CPT | Performed by: FAMILY MEDICINE

## 2022-06-21 PROCEDURE — 81001 URINALYSIS AUTO W/SCOPE: CPT | Performed by: FAMILY MEDICINE

## 2022-06-21 PROCEDURE — 250N000011 HC RX IP 250 OP 636: Performed by: INTERNAL MEDICINE

## 2022-06-21 PROCEDURE — 02HV33Z INSERTION OF INFUSION DEVICE INTO SUPERIOR VENA CAVA, PERCUTANEOUS APPROACH: ICD-10-PCS | Performed by: SURGERY

## 2022-06-21 PROCEDURE — 36415 COLL VENOUS BLD VENIPUNCTURE: CPT | Performed by: FAMILY MEDICINE

## 2022-06-21 PROCEDURE — 93306 TTE W/DOPPLER COMPLETE: CPT | Mod: 26 | Performed by: INTERNAL MEDICINE

## 2022-06-21 RX ORDER — ACETAMINOPHEN 10 MG/ML
1000 INJECTION, SOLUTION INTRAVENOUS ONCE
Status: COMPLETED | OUTPATIENT
Start: 2022-06-21 | End: 2022-06-22

## 2022-06-21 RX ORDER — NOREPINEPHRINE BITARTRATE 0.02 MG/ML
.01-.6 INJECTION, SOLUTION INTRAVENOUS CONTINUOUS
Status: DISCONTINUED | OUTPATIENT
Start: 2022-06-21 | End: 2022-06-22

## 2022-06-21 RX ORDER — PROPOFOL 10 MG/ML
5-75 INJECTION, EMULSION INTRAVENOUS CONTINUOUS
Status: DISCONTINUED | OUTPATIENT
Start: 2022-06-21 | End: 2022-06-24

## 2022-06-21 RX ORDER — METOPROLOL TARTRATE 1 MG/ML
5 INJECTION, SOLUTION INTRAVENOUS ONCE
Status: COMPLETED | OUTPATIENT
Start: 2022-06-21 | End: 2022-06-21

## 2022-06-21 RX ORDER — CHLORHEXIDINE GLUCONATE ORAL RINSE 1.2 MG/ML
15 SOLUTION DENTAL EVERY 12 HOURS
Status: DISCONTINUED | OUTPATIENT
Start: 2022-06-21 | End: 2022-06-24

## 2022-06-21 RX ORDER — HYDRALAZINE HYDROCHLORIDE 20 MG/ML
10 INJECTION INTRAMUSCULAR; INTRAVENOUS EVERY 6 HOURS PRN
Status: DISCONTINUED | OUTPATIENT
Start: 2022-06-21 | End: 2022-06-25

## 2022-06-21 RX ORDER — HYDROMORPHONE HYDROCHLORIDE 1 MG/ML
.3-.5 INJECTION, SOLUTION INTRAMUSCULAR; INTRAVENOUS; SUBCUTANEOUS
Status: DISCONTINUED | OUTPATIENT
Start: 2022-06-21 | End: 2022-06-27

## 2022-06-21 RX ORDER — NALOXONE HYDROCHLORIDE 0.4 MG/ML
0.2 INJECTION, SOLUTION INTRAMUSCULAR; INTRAVENOUS; SUBCUTANEOUS
Status: DISCONTINUED | OUTPATIENT
Start: 2022-06-21 | End: 2022-07-12 | Stop reason: HOSPADM

## 2022-06-21 RX ORDER — IOPAMIDOL 755 MG/ML
83 INJECTION, SOLUTION INTRAVASCULAR ONCE
Status: COMPLETED | OUTPATIENT
Start: 2022-06-21 | End: 2022-06-21

## 2022-06-21 RX ORDER — ENOXAPARIN SODIUM 100 MG/ML
1 INJECTION SUBCUTANEOUS ONCE
Status: COMPLETED | OUTPATIENT
Start: 2022-06-21 | End: 2022-06-21

## 2022-06-21 RX ORDER — ALBUTEROL SULFATE 90 UG/1
6 AEROSOL, METERED RESPIRATORY (INHALATION) EVERY 4 HOURS PRN
Status: DISCONTINUED | OUTPATIENT
Start: 2022-06-21 | End: 2022-06-27

## 2022-06-21 RX ORDER — ENOXAPARIN SODIUM 100 MG/ML
80 INJECTION SUBCUTANEOUS ONCE
Status: COMPLETED | OUTPATIENT
Start: 2022-06-21 | End: 2022-06-21

## 2022-06-21 RX ORDER — PHENYLEPHRINE HCL IN 0.9% NACL 50MG/250ML
.1-1.25 PLASTIC BAG, INJECTION (ML) INTRAVENOUS CONTINUOUS
Status: DISCONTINUED | OUTPATIENT
Start: 2022-06-21 | End: 2022-06-24

## 2022-06-21 RX ORDER — NALOXONE HYDROCHLORIDE 0.4 MG/ML
0.4 INJECTION, SOLUTION INTRAMUSCULAR; INTRAVENOUS; SUBCUTANEOUS
Status: DISCONTINUED | OUTPATIENT
Start: 2022-06-21 | End: 2022-07-12 | Stop reason: HOSPADM

## 2022-06-21 RX ADMIN — CHLORHEXIDINE GLUCONATE 15 ML: 1.2 RINSE ORAL at 22:22

## 2022-06-21 RX ADMIN — IOPAMIDOL 83 ML: 755 INJECTION, SOLUTION INTRAVENOUS at 13:21

## 2022-06-21 RX ADMIN — HYDRALAZINE HYDROCHLORIDE 10 MG: 20 INJECTION INTRAMUSCULAR; INTRAVENOUS at 07:55

## 2022-06-21 RX ADMIN — ENOXAPARIN SODIUM 80 MG: 80 INJECTION SUBCUTANEOUS at 12:00

## 2022-06-21 RX ADMIN — PROPOFOL 35 MCG/KG/MIN: 10 INJECTION, EMULSION INTRAVENOUS at 21:10

## 2022-06-21 RX ADMIN — ROCURONIUM BROMIDE 50 MG: 50 INJECTION, SOLUTION INTRAVENOUS at 14:31

## 2022-06-21 RX ADMIN — DEXTROSE AND SODIUM CHLORIDE: 5; 450 INJECTION, SOLUTION INTRAVENOUS at 07:19

## 2022-06-21 RX ADMIN — NICOTINE 1 PATCH: 14 PATCH, EXTENDED RELEASE TRANSDERMAL at 16:37

## 2022-06-21 RX ADMIN — FOLIC ACID: 5 INJECTION, SOLUTION INTRAMUSCULAR; INTRAVENOUS; SUBCUTANEOUS at 08:50

## 2022-06-21 RX ADMIN — MIDAZOLAM 2 MG: 1 INJECTION INTRAMUSCULAR; INTRAVENOUS at 14:31

## 2022-06-21 RX ADMIN — PROPOFOL 5 MCG/KG/MIN: 10 INJECTION, EMULSION INTRAVENOUS at 14:29

## 2022-06-21 RX ADMIN — TAZOBACTAM SODIUM AND PIPERACILLIN SODIUM 4.5 G: 500; 4 INJECTION, SOLUTION INTRAVENOUS at 11:55

## 2022-06-21 RX ADMIN — METOPROLOL TARTRATE 5 MG: 5 INJECTION INTRAVENOUS at 09:38

## 2022-06-21 RX ADMIN — SODIUM CHLORIDE 500 ML: 9 INJECTION, SOLUTION INTRAVENOUS at 09:50

## 2022-06-21 RX ADMIN — TAZOBACTAM SODIUM AND PIPERACILLIN SODIUM 4.5 G: 500; 4 INJECTION, SOLUTION INTRAVENOUS at 21:11

## 2022-06-21 RX ADMIN — ENOXAPARIN SODIUM 90 MG: 100 INJECTION SUBCUTANEOUS at 22:22

## 2022-06-21 RX ADMIN — AMIODARONE HYDROCHLORIDE 1 MG/MIN: 50 INJECTION, SOLUTION INTRAVENOUS at 18:42

## 2022-06-21 RX ADMIN — GADOTERATE MEGLUMINE 18 ML: 376.9 INJECTION INTRAVENOUS at 10:54

## 2022-06-21 RX ADMIN — SODIUM CHLORIDE, POTASSIUM CHLORIDE, SODIUM LACTATE AND CALCIUM CHLORIDE 500 ML: 600; 310; 30; 20 INJECTION, SOLUTION INTRAVENOUS at 15:57

## 2022-06-21 RX ADMIN — Medication 0.1 MCG/KG/MIN: at 17:43

## 2022-06-21 ASSESSMENT — ACTIVITIES OF DAILY LIVING (ADL)
ADLS_ACUITY_SCORE: 54

## 2022-06-21 NOTE — PROGRESS NOTES
IV Contrast- Discharge Instructions After Your CT Scan      The IV contrast you received today will be filtered from your bloodstream by your kidneys during the next 24 hours and pass from the body in urine.  You will not be aware of this process and your urine will not change in color.  To help this process you should drink at least 4 additional glasses of water or juice today.  This reduces stress on your kidneys.    Most contrast reactions are immediate.  Should you develop symptoms of concern after discharge, contact the department at the number below.  After hours you should contact your personal physician.  If you develop breathing distress or wheezing, call 911.      1.  Has the patient had a previous reaction to IV contrast? n    2.  Does the patient have kidney disease? n acute kidney injury. GFR ok    3.  Is the patient on dialysis? n    If YES to any of these questions, exam will be reviewed with a Radiologist before administering contrast.

## 2022-06-21 NOTE — PROCEDURES
Preoperative diagnosis:   Pulmonary embolism    Postoperative diagnoses:    Same with need for Central access    Procedure: Placement of right   Internal jugular triple lumen catheter    Surgeon:  Moy Robledo MD FACS    Anesthesia:   Local 1 % Lidocaine      Indication for procedure:  Patient is a 72 year old male and needs venous access for pressors and multiple meds. Risks of bleeding ( increased because of anti-coagulation which cannot safely be reversed), malposition, pneumothorax discussed with francisco Patel who wished for us to proceed.     Procedure: Patient prepped and draped in the usual fashion. The right  neck area was infiltrated with 1 % Lidocaine local anesthesia.  A right  internal jugular stick stick was made on the  second pass. The guide wire was threaded through the needle and the needle removed. A nick was made at guidewire entrance and dilator placed and removed.  The triple lumen catheter  was placed over the guidewire and the guidewire removed.The catheter was threaded to near the hub. The catheter was secured with 3-0 Vicryl sutures.  The catheter was accessed , aspirated easily, and was flushed with saline.   A clean tagaderm dressing  was applied .  A chest x-ray is pending at the time of this note.      CXR: line in good position. No pneumothorax.    Moy Robledo MD FACS

## 2022-06-21 NOTE — PROGRESS NOTES
TELE:  72M presented for etoh withdrawal and bridget.  ETOH withdrawal well managed with benzos.  bridget improving with hydration.  Bili slightly elevated, but pt has known heavy etoh use.  Vitals acceptable (mild tachycardia and htn--etoh wd?), satting well on 2L nc.   Pt seems to be responding well to current cares.  Tele hub is available for questions as needed.    ADDENDUM:  Notified by Dr. Menjivar of worsening mentation and Ct findings for pneumonia and PE (no clear R heart blowout).  Will proceed with intubation given obtundation--placed orders for vent and sedation.  PE:  Obtaining echo but based on CT results therapeutic heparin or lovenox seem appropriate for now.    Pneumonia:  Zosyn seems appropriate.  Ordering sputum culture.    ADDENDUM 1620:  abg 7.31/48 -- increasing vent rate from 14-16  Persistently tachy to average of 130s, but does not appear to be a fib.  Irregular with p waves so suspect MAT.  Treating underlying disease (pneumonia/PE).  bp's soft but acceptable (80/60s).  Echo with acceptable bi-V function.  Would not use prn metoprolol now given soft bp's.  If rhythm remains MAT vs sinus would tolerate tachycardia and/or ensure adequate pain ctrl sedation.  If coverts to a fib will plan to amio load.    ADDENDUM 1750:  Repeated ekg; now showing afib with rvr.  Amio loading.

## 2022-06-21 NOTE — PLAN OF CARE
Pt not seen this date for PT/OT as he continues to not be fully able to participate in therapy, will remain involved as needed.     Rosenda Gonzalez OT on 6/21/2022 at 2:37 PM

## 2022-06-21 NOTE — PROGRESS NOTES
Assisted CRNA with intubation.  He was intubated with an 8.0 ETT secured at 23 cm at the teeth.  Bilateral breath sounds heard and positive End Tidal CO2.  Patient placed on Ventilator with settings of Vt 450, RR 14, PEEP 10, titrated FiO2 to keep sats above 90%.  Breath sounds coarse throughout.  Suctioned large amount of thick creamy yellow secretions.

## 2022-06-21 NOTE — PROGRESS NOTES
Rouses to touch, minimal garbled verbal response. Falls back to sleep quickly. No nonverbal signs of pain. Will continue to monitor.

## 2022-06-21 NOTE — PROGRESS NOTES
[facility]    Medicine Progress Note - Hospitalist Service    Date of Admission:  6/17/2022    Assessment & Plan                            Antonio Rutherford is a 72 year old male admitted on 6/17/2022. He presented from the senior care for concerns of possible kidney stone versus sepsis.    Patient was admitted to the senior care 7 days PTA.  He is a daily drinker of whiskey and was treated for alcohol withdrawal at the senior care and then started on Bactrim for possible UTI 4 days PTA.  He had extremely poor oral intake PTA (20 oz in 7d per senior care nurse report).    He was found in the emergency department to have acute kidney injury likely due to ATN secondary to Bactrim.  He is clinically dehydrated and likely still with a component of alcohol withdrawal.  Also has been given ibuprofen.  He did not appear to have an active infection at that time.    Patient was admitted treated with thiamine, folate and IV fluids, strict I's and O's and close monitoring.    Patient's renal function had improved with IV hydration with initial creatinine improving from 2.72 to 1.28 on 6/20 but has now worsened slightly to 1.53 today.  He exhibited signs and symptoms of alcohol withdrawal and required a moderate amount of Ativan which was then transitioned to diazepam.  Over the past 24 hours however he has not been given any benzodiazepine and this morning is lethargic    Active Problems:    Alcohol withdrawal (H)    Assessment: Mental status has worsened overnight despite no benzodiazapine.    Plan: [facility] protocol.  Diazepam.  Change to oral vitamins.      ATN (acute tubular necrosis) (H)    Assessment: Bactrim and ibuprofen potentially causing ATN.  Also significant prerenal component as evidenced by improvement with hydration.  IMproved with fluids, slight worsening over past 48hours associated with diuresis and reduced IVF.  IVF moved back to maintenance dose yesterday.    Plan: Less urine output overnight.  IVF has been  "increased.      Dehydration    Assessment: Resolved.  Mediocre oral intake.    Plan: D5 1/2NS increased to 100ml/hr.  Appears hypovolemic.      CHARLOTTE (acute kidney injury) (H)    Assessment: Likely multifactorial with prerenal azotemia and possible ATN.    Plan: Recheck in a.m.  Hold all NSAIDs.     Sinus tachycardia   Assessment:  May be due to withdrawal, but unclear.     Plan:  Increase fluids, monitor.  Consider b blocker but dont want to mask acute withdrawal.    Altered mental status  Assessment: Progressive worsening which I would have expected to improve as he is no longer on sedating medications.  Plan: We will check ABG and order MRI.  Labs overall unremarkable.               Diet: Combination Diet Regular Diet Adult    DVT Prophylaxis: Pneumatic Compression Devices  Escobedo Catheter: PRESENT, indication: Strict 1-2 Hour I&O  Central Lines: None  Cardiac Monitoring: ACTIVE order. Indication: Electrolyte Imbalance (24 hours)- Magnesium <1.3 mg/ml; Potassium < =2.8 or > 5.5 mg/ml  Code Status: Full Code      Disposition Plan   Expected Discharge:  several days   Anticipated discharge location:  Awaiting care coordination huddle  Delays:            The patient's care was discussed with the Patient and will discuss with telehealth and his family.    Karl Menjivar MD  Hospitalist Service  [facility]  Securely message with the [employer]oc[employer] Web Console (learn more here)  Text page via [email or web address] Paging/Directory         Clinically Significant Risk Factors Present on Admission             # Overweight: Estimated body mass index is 28.69 kg/m  as calculated from the following:    Height as of this encounter: 1.778 m (5' 10\").    Weight as of this encounter: 90.7 kg (199 lb 15.3 oz).  # Severe Malnutrition: based on nutrition assessment     ______________________________________________________________________    Interval History   Patient was calm overnight.  He did not receive any benzodiazepines or " sedating medications.  Still sedated this morning.  Arouses to voice but difficult to understand.  Seems like he is still able to understand us and able to follow commands.  Denies any pain.  Denies any shortness of breath.    Data reviewed today: I reviewed all medications, new labs and imaging results over the last 24 hours. I personally reviewed no images or EKG's today.    Physical Exam   Vital Signs: Temp: 98.1  F (36.7  C) Temp src: Tympanic BP: (!) 151/88 Pulse: 106   Resp: 26 SpO2: 96 %   Oxygen Delivery: 2 LPM  Weight: 199 lbs 15.32 oz  Constitutional: Somnolent versus lethargic.  Arousable.  No distress.  Respiratory: Lung fields are clear to auscultation bilaterally.  Upper airway congestion heard.  Cardiovascular: Currently regular rate, borderline tachycardic.  GI: Abdomen soft, nontender  Musculoskeletal: Patient is able to move extremities.  Neurologic: Reflexes are symmetric.  Sensation appears intact.  Remainder exam difficult for him to perform as he keeps falling asleep.  Neuropsychiatric: Patient currently seems lethargic and has been somnolent overnight.    Data   Recent Labs   Lab 06/21/22  0523 06/20/22  1206 06/20/22  0905 06/20/22  0655 06/20/22  0441 06/19/22  1143 06/19/22  1139 06/19/22  0806 06/18/22  0609   WBC 13.8*  --   --   --   --  6.6  --   --  5.2   HGB 15.4  --   --   --   --  15.0  --   --  14.5   *  --   --   --   --  105*  --   --  105*     --   --   --   --  237  --   --  234   INR  --   --   --   --   --   --  1.14  --   --      --  140  --  135  --   --    < > 140   POTASSIUM 4.1  --  4.4  --  5.1  --   --    < > 4.1   CHLORIDE 102  --  103  --  107  --   --    < > 106   CO2 25  --  23  --  11*  --   --    < > 21   BUN 25  --  20  --  21  --   --    < > 36*   CR 1.53*  --  1.42*  --  1.28  --   --    < > 2.06*   ANIONGAP 10  --  14  --  17*  --   --    < > 13   PAWAN 9.6  --  9.4  --  8.5*  --   --    < > 8.6   * 124* 105   < > 83  --   --    < >  69*   ALBUMIN 4.0  --   --   --   --   --   --   --  3.6   PROTTOTAL 7.5  --   --   --   --   --   --   --  6.1*   BILITOTAL 1.6*  --   --   --   --   --   --   --  0.9   ALKPHOS 41  --   --   --   --   --   --   --  40   ALT 17  --   --   --   --   --   --   --  18   AST 33  --   --   --   --   --   --   --  17    < > = values in this interval not displayed.     No results found for this or any previous visit (from the past 24 hour(s)).

## 2022-06-21 NOTE — ANESTHESIA PROCEDURE NOTES
Airway       Patient location during procedure: ICU       Procedure Start/Stop Times: 6/21/2022 2:33 PM  Staff -        CRNA: Kellerman, David, APRN CRNA       Performed By: CRNA  Consent for Airway        Urgency: emergent       Consent: The procedure was performed in an emergent situation.  Report Obtained from Primary Care Team       History regarding most recent potassium obtained: Yes       History regarding presence/absence of renal failure obtained:Yes       History regarding stroke/CVA obtained:Yes       History regarding presence/absence of NM disorder: YesIndications and Patient Condition       Indications for airway management: altered level of consciousness       Mallampati: Not Assessed     Induction type:intravenous       Mask difficulty assessment: 0 - not attempted    Final Airway Details       Final airway type: endotracheal airway       Successful airway: ETT - single  Endotracheal Airway Details        ETT size (mm): 8.0       Cuffed: yes       Cuff volume (mL): 8       Successful intubation technique: video laryngoscopy       VL Blade Size: Glidescope 4       Grade View of Cords: 1       Adjucts: stylet       Position: Right       Measured from: gums/teeth       Secured at (cm): 23       Bite block used: None    Post intubation assessment        ETT secured, Vent settings by primary/ICU team, Primary/ICU team to review CXR, Sedation to be ordered by primary/ICU team and No apparent complications       Placement verified by: capnometry, equal breath sounds, chest rise and x-ray        Number of attempts at approach: 1       Number of other approaches attempted: 0       Secured with: commercial tube salazar       Ease of procedure: easy       Dentition: Intact and Unchanged    Medication(s) Administered   Medication Administration Time: 6/21/2022 2:33 PM

## 2022-06-21 NOTE — PROGRESS NOTES
Upon returning from CT scan patient requiring 5 L of oxygen and only responsive to sternal rub.  Reviewed CT with radiologist.  Patient has right pulmonary embolism at segmental level.  Also has left sided pneumonia.  He has already received 80 mg of enoxaparin and has been on Zosyn since earlier today.    In evaluation of his mental status I am concerned that he may not adequately protect his airway and would suggest intubation.    Called and discussed situation with his son who is in agreement with the plan of care.  Also called Dr. Evans who graciously put in sedation and vent setting orders.  Talked with anesthesia and nursing staff.  Will intubate patient.    Karl Menjivar MD

## 2022-06-21 NOTE — PROGRESS NOTES
This morning patient developed more prominent sinus tachycardia and more increased hypoxia.  Yesterday he was on 2 L of oxygen but O2 sats were greater than 96%.  Overnight respiratory rate increased to 32-40, pulse 105-110.  This morning he was lethargic as opposed to previously being somnolent.    D-dimer and brain MRI ordered as well as UA and procalcitonin added on.  Procalcitonin was negative.  Brain MRI was negative.  Did start Zosyn although of an abundance of caution given his interval worsening of mental status.  D-dimer came back markedly elevated.    Due to power outage this morning there is a delay in getting CT scan.  We will initiate treatment with Lovenox 1 mg/kg now as to not delay treatment and get CT as soon as able to confirm diagnosis for probable PE.  Conversely this could represent multifocal pulmonary infection although chest x-ray yesterday was reassuring.    At this time he remains hemodynamically stable with O2 saturation 96% on 2 L, pulse 95.  Blood pressure 137/86.  Respiratory rate 34.    Son was updated.    Karl Menjivar MD

## 2022-06-21 NOTE — ANESTHESIA POSTPROCEDURE EVALUATION
Patient: Antonio Rutherford    Procedure: * No procedures listed *       Anesthesia Type:  No value filed.    Note:  Disposition: ICU            ICU Sign Out: Anesthesiologist/ICU physician sign out WAS performed   Postop Pain Control:            Sign Out: Well controlled pain   PONV:    Neuro/Psych:             Sign Out: Acceptable/Baseline neuro status   Airway/Respiratory:             Sign Out: AIRWAY IN SITU/Resp. Support; Acceptable/Baseline resp. status               Airway in situ/Resp. Support: ETT   CV/Hemodynamics:             Sign Out: Acceptable CV status   Other NRE: NONE   DID A NON-ROUTINE EVENT OCCUR? No           Last vitals:  Vitals:    06/21/22 1000 06/21/22 1100 06/21/22 1200   BP: 137/86  (!) 140/81   Pulse: 87 95 100   Resp:      Temp:      SpO2: 95% 97% 92%       Electronically Signed By: David Kellerman, APRN CRNA  June 21, 2022  2:44 PM

## 2022-06-22 ENCOUNTER — APPOINTMENT (OUTPATIENT)
Dept: CARDIOLOGY | Facility: OTHER | Age: 72
DRG: 673 | End: 2022-06-22
Attending: FAMILY MEDICINE
Payer: MEDICARE

## 2022-06-22 LAB
ALBUMIN SERPL-MCNC: 3 G/DL (ref 3.5–5.7)
ALP SERPL-CCNC: 40 U/L (ref 34–104)
ALT SERPL W P-5'-P-CCNC: 16 U/L (ref 7–52)
AMMONIA PLAS-SCNC: 34 UMOL/L (ref 16–53)
ANION GAP SERPL CALCULATED.3IONS-SCNC: 11 MMOL/L (ref 3–14)
AST SERPL W P-5'-P-CCNC: 56 U/L (ref 13–39)
ATRIAL RATE - MUSE: 123 BPM
ATRIAL RATE - MUSE: 134 BPM
BACTERIA BLD CULT: NO GROWTH
BACTERIA BLD CULT: NO GROWTH
BASE EXCESS BLDA CALC-SCNC: -2.3 MMOL/L (ref -9–1.8)
BILIRUB SERPL-MCNC: 1.2 MG/DL (ref 0.3–1)
BUN SERPL-MCNC: 34 MG/DL (ref 7–25)
CALCIUM SERPL-MCNC: 8.6 MG/DL (ref 8.6–10.3)
CHLORIDE BLD-SCNC: 108 MMOL/L (ref 98–107)
CO2 SERPL-SCNC: 21 MMOL/L (ref 21–31)
CREAT SERPL-MCNC: 1.63 MG/DL (ref 0.7–1.3)
DIASTOLIC BLOOD PRESSURE - MUSE: NORMAL MMHG
DIASTOLIC BLOOD PRESSURE - MUSE: NORMAL MMHG
ERYTHROCYTE [DISTWIDTH] IN BLOOD BY AUTOMATED COUNT: 13.3 % (ref 10–15)
GFR SERPL CREATININE-BSD FRML MDRD: 44 ML/MIN/1.73M2
GLUCOSE BLD-MCNC: 118 MG/DL (ref 70–105)
GLUCOSE BLDC GLUCOMTR-MCNC: 103 MG/DL (ref 70–99)
HCO3 BLD-SCNC: 24 MMOL/L (ref 21–28)
HCT VFR BLD AUTO: 43.6 % (ref 40–53)
HGB BLD-MCNC: 14.5 G/DL (ref 13.3–17.7)
INTERPRETATION ECG - MUSE: NORMAL
INTERPRETATION ECG - MUSE: NORMAL
LACTATE SERPL-SCNC: 2.9 MMOL/L (ref 0.7–2)
LVEF ECHO: NORMAL
MCH RBC QN AUTO: 34.6 PG (ref 26.5–33)
MCHC RBC AUTO-ENTMCNC: 33.3 G/DL (ref 31.5–36.5)
MCV RBC AUTO: 104 FL (ref 78–100)
O2/TOTAL GAS SETTING VFR VENT: 30 %
P AXIS - MUSE: 17 DEGREES
P AXIS - MUSE: NORMAL DEGREES
PCO2 BLD: 43 MM HG (ref 35–45)
PH BLD: 7.34 [PH] (ref 7.35–7.45)
PLATELET # BLD AUTO: 213 10E3/UL (ref 150–450)
PO2 BLD: 73 MM HG (ref 80–105)
POTASSIUM BLD-SCNC: 3.5 MMOL/L (ref 3.5–5.1)
PR INTERVAL - MUSE: 146 MS
PR INTERVAL - MUSE: NORMAL MS
PROCALCITONIN SERPL-MCNC: 1.5 NG/ML
PROT SERPL-MCNC: 6 G/DL (ref 6.4–8.9)
QRS DURATION - MUSE: 88 MS
QRS DURATION - MUSE: 96 MS
QT - MUSE: 298 MS
QT - MUSE: 304 MS
QTC - MUSE: 445 MS
QTC - MUSE: 448 MS
R AXIS - MUSE: 91 DEGREES
R AXIS - MUSE: 94 DEGREES
RBC # BLD AUTO: 4.19 10E6/UL (ref 4.4–5.9)
SODIUM SERPL-SCNC: 140 MMOL/L (ref 134–144)
SYSTOLIC BLOOD PRESSURE - MUSE: NORMAL MMHG
SYSTOLIC BLOOD PRESSURE - MUSE: NORMAL MMHG
T AXIS - MUSE: -41 DEGREES
T AXIS - MUSE: 27 DEGREES
VENTRICULAR RATE- MUSE: 131 BPM
VENTRICULAR RATE- MUSE: 134 BPM
WBC # BLD AUTO: 14.1 10E3/UL (ref 4–11)

## 2022-06-22 PROCEDURE — 82140 ASSAY OF AMMONIA: CPT | Performed by: FAMILY MEDICINE

## 2022-06-22 PROCEDURE — 250N000011 HC RX IP 250 OP 636: Performed by: INTERNAL MEDICINE

## 2022-06-22 PROCEDURE — 80053 COMPREHEN METABOLIC PANEL: CPT | Performed by: FAMILY MEDICINE

## 2022-06-22 PROCEDURE — 93306 TTE W/DOPPLER COMPLETE: CPT | Mod: 26 | Performed by: INTERNAL MEDICINE

## 2022-06-22 PROCEDURE — 999N000157 HC STATISTIC RCP TIME EA 10 MIN

## 2022-06-22 PROCEDURE — 250N000011 HC RX IP 250 OP 636: Performed by: FAMILY MEDICINE

## 2022-06-22 PROCEDURE — 258N000001 HC RX 258: Performed by: FAMILY MEDICINE

## 2022-06-22 PROCEDURE — 87040 BLOOD CULTURE FOR BACTERIA: CPT | Performed by: FAMILY MEDICINE

## 2022-06-22 PROCEDURE — 258N000003 HC RX IP 258 OP 636: Performed by: FAMILY MEDICINE

## 2022-06-22 PROCEDURE — 200N000001 HC R&B ICU

## 2022-06-22 PROCEDURE — 255N000002 HC RX 255 OP 636: Performed by: FAMILY MEDICINE

## 2022-06-22 PROCEDURE — 999N000253 HC STATISTIC WEANING TRIALS

## 2022-06-22 PROCEDURE — 36415 COLL VENOUS BLD VENIPUNCTURE: CPT | Performed by: FAMILY MEDICINE

## 2022-06-22 PROCEDURE — 84145 PROCALCITONIN (PCT): CPT | Performed by: FAMILY MEDICINE

## 2022-06-22 PROCEDURE — 94003 VENT MGMT INPAT SUBQ DAY: CPT

## 2022-06-22 PROCEDURE — 250N000013 HC RX MED GY IP 250 OP 250 PS 637: Performed by: INTERNAL MEDICINE

## 2022-06-22 PROCEDURE — C9113 INJ PANTOPRAZOLE SODIUM, VIA: HCPCS | Performed by: INTERNAL MEDICINE

## 2022-06-22 PROCEDURE — 85027 COMPLETE CBC AUTOMATED: CPT | Performed by: FAMILY MEDICINE

## 2022-06-22 PROCEDURE — 999N000208 ECHOCARDIOGRAM COMPLETE

## 2022-06-22 PROCEDURE — 250N000009 HC RX 250: Performed by: FAMILY MEDICINE

## 2022-06-22 PROCEDURE — 99291 CRITICAL CARE FIRST HOUR: CPT | Performed by: FAMILY MEDICINE

## 2022-06-22 PROCEDURE — 36415 COLL VENOUS BLD VENIPUNCTURE: CPT | Performed by: INTERNAL MEDICINE

## 2022-06-22 PROCEDURE — 36600 WITHDRAWAL OF ARTERIAL BLOOD: CPT | Performed by: FAMILY MEDICINE

## 2022-06-22 PROCEDURE — 250N000009 HC RX 250: Performed by: INTERNAL MEDICINE

## 2022-06-22 PROCEDURE — 82803 BLOOD GASES ANY COMBINATION: CPT | Performed by: FAMILY MEDICINE

## 2022-06-22 PROCEDURE — 83605 ASSAY OF LACTIC ACID: CPT | Performed by: FAMILY MEDICINE

## 2022-06-22 RX ORDER — ENOXAPARIN SODIUM 150 MG/ML
1.5 INJECTION SUBCUTANEOUS EVERY 24 HOURS
Status: DISCONTINUED | OUTPATIENT
Start: 2022-06-22 | End: 2022-06-27

## 2022-06-22 RX ORDER — METHYLPREDNISOLONE SODIUM SUCCINATE 40 MG/ML
40 INJECTION, POWDER, LYOPHILIZED, FOR SOLUTION INTRAMUSCULAR; INTRAVENOUS EVERY 24 HOURS
Status: DISCONTINUED | OUTPATIENT
Start: 2022-06-22 | End: 2022-06-25

## 2022-06-22 RX ORDER — SODIUM CHLORIDE 9 MG/ML
INJECTION, SOLUTION INTRAVENOUS CONTINUOUS
Status: DISCONTINUED | OUTPATIENT
Start: 2022-06-22 | End: 2022-07-12 | Stop reason: HOSPADM

## 2022-06-22 RX ADMIN — CHLORHEXIDINE GLUCONATE 15 ML: 1.2 RINSE ORAL at 20:49

## 2022-06-22 RX ADMIN — Medication 10 MG: at 22:57

## 2022-06-22 RX ADMIN — METHYLPREDNISOLONE SODIUM SUCCINATE 40 MG: 40 INJECTION, POWDER, FOR SOLUTION INTRAMUSCULAR; INTRAVENOUS at 10:18

## 2022-06-22 RX ADMIN — PROPOFOL 20 MCG/KG/MIN: 10 INJECTION, EMULSION INTRAVENOUS at 13:59

## 2022-06-22 RX ADMIN — ACETAMINOPHEN 1000 MG: 10 INJECTION, SOLUTION INTRAVENOUS at 22:39

## 2022-06-22 RX ADMIN — TAZOBACTAM SODIUM AND PIPERACILLIN SODIUM 4.5 G: 500; 4 INJECTION, SOLUTION INTRAVENOUS at 07:42

## 2022-06-22 RX ADMIN — Medication 10 MG: at 00:18

## 2022-06-22 RX ADMIN — SODIUM CHLORIDE: 900 INJECTION, SOLUTION INTRAVENOUS at 12:36

## 2022-06-22 RX ADMIN — PERFLUTREN 2 ML: 6.52 INJECTION, SUSPENSION INTRAVENOUS at 10:12

## 2022-06-22 RX ADMIN — TAZOBACTAM SODIUM AND PIPERACILLIN SODIUM 4.5 G: 500; 4 INJECTION, SOLUTION INTRAVENOUS at 04:00

## 2022-06-22 RX ADMIN — CHLORHEXIDINE GLUCONATE 15 ML: 1.2 RINSE ORAL at 10:15

## 2022-06-22 RX ADMIN — TAZOBACTAM SODIUM AND PIPERACILLIN SODIUM 4.5 G: 500; 4 INJECTION, SOLUTION INTRAVENOUS at 18:18

## 2022-06-22 RX ADMIN — ENOXAPARIN SODIUM 150 MG: 150 INJECTION SUBCUTANEOUS at 10:15

## 2022-06-22 RX ADMIN — PROPOFOL 25 MCG/KG/MIN: 10 INJECTION, EMULSION INTRAVENOUS at 02:00

## 2022-06-22 RX ADMIN — FOLIC ACID: 5 INJECTION, SOLUTION INTRAMUSCULAR; INTRAVENOUS; SUBCUTANEOUS at 11:15

## 2022-06-22 RX ADMIN — Medication 0.4 MCG/KG/MIN: at 03:45

## 2022-06-22 RX ADMIN — PROPOFOL 12.5 MCG/KG/MIN: 10 INJECTION, EMULSION INTRAVENOUS at 22:58

## 2022-06-22 RX ADMIN — DEXTROSE AND SODIUM CHLORIDE: 5; 450 INJECTION, SOLUTION INTRAVENOUS at 15:28

## 2022-06-22 RX ADMIN — TAZOBACTAM SODIUM AND PIPERACILLIN SODIUM 4.5 G: 500; 4 INJECTION, SOLUTION INTRAVENOUS at 13:12

## 2022-06-22 RX ADMIN — PANTOPRAZOLE SODIUM 40 MG: 40 INJECTION, POWDER, FOR SOLUTION INTRAVENOUS at 07:45

## 2022-06-22 ASSESSMENT — ACTIVITIES OF DAILY LIVING (ADL)
ADLS_ACUITY_SCORE: 54
ADLS_ACUITY_SCORE: 50
ADLS_ACUITY_SCORE: 54
ADLS_ACUITY_SCORE: 50
ADLS_ACUITY_SCORE: 54
ADLS_ACUITY_SCORE: 54
ADLS_ACUITY_SCORE: 50
ADLS_ACUITY_SCORE: 54
ADLS_ACUITY_SCORE: 50
ADLS_ACUITY_SCORE: 54

## 2022-06-22 NOTE — PROGRESS NOTES
Patient Remained stable throughout the night changes done to vent peep 10 - 5 and FiO2 from 50% - 30% patient remained stable with no other changes made.      Johnny Weldon RT on 6/22/2022 at 6:10 AM

## 2022-06-22 NOTE — PROGRESS NOTES
Spoke with [employer].  Wean trial stopped at 20 minutes.  Patient was able maintain RR and Vt, but he is unable to follow commands and has large amounts of secretions.  Placed back on previous settings of Vt 450, RR 16, PEEP 5, 30%.

## 2022-06-22 NOTE — PROGRESS NOTES
Patient on Ventilator current settings are Vt 450, RR 16, PEEP 10, 50%.  SpO2 in remains in the 90s.  He does desat when laid flat in bed.  Breath sounds clear.  ETT moved Q2 to prevent pressure sores.

## 2022-06-22 NOTE — PROGRESS NOTES
Time out performed prior to central line placement confirming patient and procedure being performed. Tolerated procedure. Will continue to monitor.

## 2022-06-22 NOTE — PROGRESS NOTES
Kapil started and titrated with goal of SBP above 90 to be able to start amiodarone for rate control per tele icu. Oral cares provided. Fanny remains in place and patent.

## 2022-06-22 NOTE — PROGRESS NOTES
Tele ICU Note:    72 yom admitted initially with etoh withdrawal and CHARLOTTE but ultimately required on intubation on 6/21 for obtundation in the setting of new PE and pneumonia.    Vent 16/450/5/30% w/ most recent ABG 7.34/43/73    TTE: a-fib w/ RVR.  EF 55-60%; RV size and function nl.    On phenylephrine at 0.3      Abbreviated Assessment and Plan:    # Acute hypoxic respiratory failure secondary to below  # PE  # Pneumonia  -vent settings minimal; suggest a spontaneous breathing trial (PS 5/5) to assess candidacy for extubation if mental status allows  -Target RASS 0/-1  -Cont. LMWH for PE, Zosyn for pneumonia      # Shock - likely distributive etiology; TTE demonstrates essentially normal biventricular function  -wean phenylephrine as able, target MAP > 65    D/w Dr. Shelton.    Please call tele if questions.    Rupa Nuñez MD  Pulmonary and Critical Care Medicine        1040 Addendum  Called by RT.  Pt placed on PS 5/5 x 20 minutes and tolerating well w/ RR 20 and Vt 400.  Mental status remains poor and patient has copious secretions, both of which are barriers to extubation.  Return to full vent settings.

## 2022-06-22 NOTE — PROGRESS NOTES
HR converted to SR 2305.  Amiodarone dose change to 0.5  Carolina Menjivar RN.............................6/21/2022 11:30 PM

## 2022-06-22 NOTE — PROGRESS NOTES
Started wean trial per MD.  Patient placed on pressures 10/5, 30%. Initial Spontaneous RR 18, spontaneous Vt 400.

## 2022-06-22 NOTE — PROGRESS NOTES
Contacted Tele-ICU for feedback on weaning down propofol slowly d/t RASS of -4 and possible decrease of eric-synephrine d/t decreasing HR. Tele-ICU doc recommended cutting propofol to 10 mcg and Ne

## 2022-06-22 NOTE — PROGRESS NOTES
Patient remains intubated. Heart rated decreased to mid 40s, spoke with tele ICU and titrated medications. Heart rate currently in the mid 60s. Repositioned and oral cares provided, will continue to monitor.

## 2022-06-22 NOTE — PROGRESS NOTES
Echo showed probable calcific nodule. Blood cultures have been negative. Euvolemic.   incr IVF rate.   Monitor renal function/urine output

## 2022-06-22 NOTE — PLAN OF CARE
Pt not appropriate to initiate PT/OT services yet, will remain involved and attempt to see pt when pt appropriate.     Rosenda Gonzalez, OT on 6/22/2022 at 2:19 PM

## 2022-06-22 NOTE — PROGRESS NOTES
Present during intubation. Bleeding to upper gums noted prior to intubation. Oral cares provided significant amount of oral secretions. Propofol started for sedation. MD updated on heart rate. Will continue to monitor.

## 2022-06-22 NOTE — PROGRESS NOTES
[facility]    Medicine Progress Note - Hospitalist Service    Date of Admission:  6/17/2022    Assessment & Plan     Antonio Rutherford is a 72 year old male admitted on 6/17/2022. He presented from the [facility] for concerns of possible kidney stone versus sepsis.     Patient was admitted to the [facility] 7 days PTA.  He is a daily drinker of whiskey and was treated for alcohol withdrawal at the [facility] and then started on Bactrim for possible UTI 4 days PTA.  He had extremely poor oral intake PTA (20 oz in 7d per [facility] nurse report).     He was found in the emergency department to have acute kidney injury likely due to ATN secondary to Bactrim.  He is clinically dehydrated and likely still with a component of alcohol withdrawal.  Also has been given ibuprofen.  He did not appear to have an active infection at that time.     Patient was admitted treated with thiamine, folate and IV fluids, strict I's and O's and close monitoring.     Active Problems:    Alcohol withdrawal (H) with alcohol dependence.   Assessment: Sedated and intubated in the ICU on 6/21/22 for somnolence. Ammonia level was normal, ABG did not show significant CO2 retention, head CT and brain MRI neg for acute findings.     Plan: should be out of time frame for withdrawal.       Acute PE, POA  -lovenox 1.5mg/kg/d    ATN (acute tubular necrosis) (H)    Assessment: Bactrim and ibuprofen potentially causing ATN.  Also significant prerenal component as evidenced by improvement with hydration.  IMproved with fluids, slight worsening over past 48hours associated with diuresis and reduced IVF.  IVF moved back to maintenance dose yesterday. Hematuria today.     Plan: monitor labs. Check echo for volume status. Avoid NSAIDs and nephrotoxins as able. Renally dose zosyn.     ? Of UTI. POA. Was given bactrim PTA. Urine culture negative. On zosyn for pneumonia.     Hypotension likely due to propofol sedation, on pressors.  -repeat lactate  -continue  zosyn  -check procalcitonin      Dehydration    Assessment: Resolved.      Plan: D5 1/2NS at 50ml/hr as he is intubated/NPO. Appears hypovolemic. Repeat echo today now that HR is improved.        CHARLOTTE (acute kidney injury) (H)    Assessment: Likely multifactorial with prerenal azotemia and possible ATN.    Plan: Recheck in a.m.  Hold all NSAIDs.      Sinus tachycardia, improved. Got amiodarone x24 hours. Now HR regular and controlled.    Assessment:  May be due to withdrawal, but unclear.     Plan: repeat echo. Monitor volume status.      Encephalopathy, metabolic. CT and MRI head/brain negative for acute findings.   Assessment: Progressive worsening which I would have expected to improve as he is no longer on sedating medications. ABG stable. Ammonia was normal.   Plan:      diarrhea  -check c diff, stool studies    Smoker. Cannot exclude COPD  -steroids    Stage 1 pressure ulcer of buttocks  Heel pressure ulcer stage 1. Left foot.   -offload  -rectal tube place  -dressings    Total critical care time 70 minutes     Diet: Combination Diet Regular Diet Adult    DVT Prophylaxis: Enoxaparin (Lovenox) subcutaneous treatment dose for PE  [place] Catheter: PRESENT, indication: Strict 1-2 Hour I&O  Central Lines: PRESENT  CVC TRIPLE LUMEN Right External jugular-Site Assessment: WDL  Cardiac Monitoring: ACTIVE order. Indication: Electrolyte Imbalance (24 hours)- Magnesium <1.3 mg/ml; Potassium < =2.8 or > 5.5 mg/ml  Code Status: Full Code      Disposition Plan   Expected Discharge:  unclear. Several days   Anticipated discharge location: other (comment);correctional facility    Delays:   sedation, encephalopathy, withdrawal        The patient's care was discussed with the Bedside Nurse and Primary team.    Erna Shelton DO  Hospitalist Service  [facility]  Securely message with the Vocera Web Console (learn more here)  Text page via [employer] Paging/Directory         Clinically Significant Risk Factors  Present on Admission                    ______________________________________________________________________    Interval History   Tachycardic overnight.  Got amiodarone for the last 24 hours.  Heart rate has now improved.  Weaning sedation.  Continues to have diarrhea    Data reviewed today: I reviewed all medications, new labs and imaging results over the last 24 hours. I personally reviewed no images or EKG's today.    Physical Exam   Vital Signs: Temp: 97.8  F (36.6  C) Temp src: Temporal BP: (!) 147/88 Pulse: 69   Resp: 11 SpO2: 94 % O2 Device: Mechanical Ventilator Oxygen Delivery: 5 LPM  Weight: 210 lbs 15.68 oz  General Appearance: Sedated but will arouse to painful stimuli. Intubated. Generally poor hygiene and unkept with long fingernails. Mild tremor with pain  Respiratory: coarse throughout.   Cardiovascular: RR.   GI: abd soft, NT, obese.   Skin: stage 1 pressure ulcer of left buttock cheek.  Limited to skin.  Blister of left heel.  Stage I ulcer.  Other:      Data   Recent Labs   Lab 06/22/22  0812 06/22/22  0542 06/21/22  0523 06/20/22  1206 06/20/22  0905 06/20/22  0441 06/19/22  1143 06/19/22  1139   WBC  --  14.1* 13.8*  --   --   --  6.6  --    HGB  --  14.5 15.4  --   --   --  15.0  --    MCV  --  104* 105*  --   --   --  105*  --    PLT  --  213 229  --   --   --  237  --    INR  --   --   --   --   --   --   --  1.14   NA  --  140 137  --  140   < >  --   --    POTASSIUM  --  3.5 4.1  --  4.4   < >  --   --    CHLORIDE  --  108* 102  --  103   < >  --   --    CO2  --  21 25  --  23   < >  --   --    BUN  --  34* 25  --  20   < >  --   --    CR  --  1.63* 1.53*  --  1.42*   < >  --   --    ANIONGAP  --  11 10  --  14   < >  --   --    PAWAN  --  8.6 9.6  --  9.4   < >  --   --    * 118* 196*   < > 105   < >  --   --    ALBUMIN  --  3.0* 4.0  --   --   --   --   --    PROTTOTAL  --  6.0* 7.5  --   --   --   --   --    BILITOTAL  --  1.2* 1.6*  --   --   --   --   --    ALKPHOS  --  40 41   --   --   --   --   --    ALT  --  16 17  --   --   --   --   --    AST  --  56* 33  --   --   --   --   --     < > = values in this interval not displayed.     Recent Results (from the past 24 hour(s))   MR Brain w/o & w Contrast    Narrative    Exam: MR BRAIN W/O & W CONTRAST,    Exam reason: Mental status change, unknown cause    Technique:   Pre-contrast sagittal T1, axial T1, axial FLAIR, axial GRE, axial  diffusion, and axial T2 weighted images of the brain were obtained.   Post gadolinium axial images of the brain with sagittal and coronal  reformats and axial T1-weighted images of the brain obtained.      Meds/Contrast: Dotarem 18 mL    Comparison: 6/19/2022    Findings:     Parenchyma: No evidence of an acute infarct, prior infarct in a major  vascular territory, mass, abnormal enhancement or intraparenchymal  hemorrhage.       Mild diffuse cerebral volume loss. There are couple small foci of  T2/FLAIR hyperintense signal in the periventricular and deep white  matter, nonspecific but likely due to chronic microvascular ischemic  change. Probable remote lacunar infarcts in the basal ganglia  bilaterally. There is a single nonspecific punctate focus of  susceptibility artifact in the left frontal lobe.    No midline shift. The basilar cisterns are patent.    Major intracranial flow voids are preserved.    Extra-axial spaces: No extra-axial hemorrhage or fluid collection.     Ventricles: Normal.     Paranasal Sinuses: Scattered minimal mucosal thickening in the ethmoid  sinuses. The sinuses are otherwise essentially clear.   Mastoid air cells: Unremarkable.  Calvarium: Unremarkable.    Orbits: Normal.        Impression    Impression:  No acute intracranial abnormality. No acute infarct, intracranial  hemorrhage, or enhancing abnormality.    CLOTILDE DIOR MD         SYSTEM ID:  [id number]   CT Chest Pulmonary Embolism w Contrast    Narrative    Exam:  CT CHEST PULMONARY EMBOLISM W CONTRAST    Exam reason:  PE  suspected, low/intermediate prob, positive D-dimer,  hypoxia, tachycardia.     Technique:  Contrast enhanced helical CT pulmonary angiography was  performed. Sagittal and coronal reformats as well as coronal MIP  reformats were obtained.     Meds/Contrast: 83 ml isovue 370    Comparison:  6/20/2022, 6/17/2022    FINDINGS:    Technical quality: Diagnostic.    Cardiovascular:   -There are several segmental pulmonary emboli in the right lower lobe,  involving the majority of the right lower lobe. There are a couple of  subsegmental pulmonary emboli in the right upper lobe. There are a  couple of possible subsegmental pulmonary emboli in the left lower  lobe.  -No aortic or other cardiovascular abnormalities.  Lungs/Airways: There are foci of consolidation throughout much of the  left lower lobe. Breathing motion obscures fine details in the lungs.  Nidhi/Mediastinum: No adenopathy or mass.  Pleura: No pleural effusion or pneumothorax.  Chest Wall/Axilla: There is bilateral gynecomastia.    Visualized Upper Abdomen: There are multiple renal cysts which should  appear similar to the prior exam. No acute findings in the upper  abdomen.  Musculoskeletal: No acute osseous abnormality.      Impression    IMPRESSION:    Several segmental pulmonary emboli in the right lower lobe involving  the majority of the right lower lobe with a couple of subsegmental  pulmonary emboli in the right upper lobe and a couple of possible  subsegmental pulmonary in the left lower lobe.    Foci of consolidation in the left lower lobe compatible with an  infectious or inflammatory process such as pneumonia.    These results were discussed with Dr. Menjivar by Dr. Mcfadden on  6/21/2022 1:30 PM.     CLOTILDE MCFADDEN MD         SYSTEM ID:  [id number]   Echocardiogram Complete   Result Value    LVEF  55-60%    Narrative    [id number]  [id number]  [id number]  [id number]^NADYA^MEGHANA^VISHAL     [facility]  [address] [place]  [address]     Name: ALEJANDRA MAIN  MRN: [id number]  : 1950  Study Date: 2022 01:39 PM  Age: 72 yrs  Gender: Male  Patient Location: [facility]  Reason For Study: Pulmonary Embolism  Ordering Physician: MEGHANA COVINGTON  Performed By: Louise Glasgow RDCS, RVT     BSA: 2.1 m2  Height: 70 in  Weight: 199 lb  HR: 140  BP: 140/81 mmHg  ______________________________________________________________________________  Procedure  Complete Portable Echo Adult.  ______________________________________________________________________________  Interpretation Summary  Extremely limited study due to a-fib RVR and tachycardia.     Left ventricular size, wall motion and function are normal. The ejection  fraction is 55-60%.  Right ventricular function, chamber size, wall motion, and thickness are  normal.  There is trileaflet aortic stenosis. Unable to exclude a small, mobile  echodensity on the AV, ventricular side. Recommend further evaluation.  The inferior vena cava was normal in size with preserved respiratory  variability. No pericardial effusion is present.     There is no prior study for direct comparison. Suggest further evaluation of  the AV especially if infection is a consideration. Biventricular function  appears to be preserved yet study is very limited by severe tachycardia.  ______________________________________________________________________________  Left Ventricle  Left ventricular size, wall motion and function are normal. The ejection  fraction is 55-60%. Diastolic function not assessed due to atrial  fibrillation.     Right Ventricle  Right ventricular function, chamber size, wall motion, and thickness are  normal.     Atria  The atria cannot be assessed.     Mitral Valve  The mitral valve is normal.     Aortic Valve  The aortic valve is tricuspid. There is trileaflet aortic stenosis. Unable to  exclude a small, mobile echodensity on the AV, ventricular side. Recommend  further evaluation. No aortic  regurgitation is present.     Tricuspid Valve  The tricuspid valve is normal.     Pulmonic Valve  The pulmonic valve cannot be assessed.     Vessels  The aorta root is normal. The inferior vena cava was normal in size with  preserved respiratory variability.     Pericardium  No pericardial effusion is present.     Compared to Previous Study  There is no prior study for direct comparison.     ______________________________________________________________________________  MMode/2D Measurements & Calculations  IVSd: 1.4 cm  LVIDd: 3.9 cm  LVIDs: 2.2 cm  LVPWd: 1.0 cm  FS: 42.4 %     LV mass(C)d: 159.4 grams  LV mass(C)dI: 76.5 grams/m2  Ao root diam: 3.3 cm  asc Aorta Diam: 3.6 cm  LVOT diam: 2.2 cm  LVOT area: 3.8 cm2  LA Volume (BP): 41.3 ml  LA Volume Index (BP): 19.9 ml/m2  RWT: 0.54     Doppler Measurements & Calculations  MV E max margarito: 59.5 cm/sec  MV A max margarito: 87.9 cm/sec  MV E/A: 0.68     MV dec slope: 329.0 cm/sec2  MV dec time: 0.18 sec  Ao V2 max: 230.8 cm/sec  Ao max P.0 mmHg  Ao V2 mean: 167.3 cm/sec  Ao mean P.8 mmHg  Ao V2 VTI: 27.2 cm  EMELY(I,D): 2.1 cm2  EMELY(V,D): 1.8 cm2  LV V1 max P.0 mmHg  LV V1 max: 111.8 cm/sec  LV V1 VTI: 14.8 cm  SV(LVOT): 56.1 ml  SI(LVOT): 26.9 ml/m2  PA acc time: 0.06 sec  AV Margarito Ratio (DI): 0.48  EMELY Index (cm2/m2): 0.99  Lateral E/e': 6.4     ______________________________________________________________________________  Report approved by: Jasmyne BOOTHE 2022 02:47 PM         XR Chest Port 1 View    Narrative    PROCEDURE:  XR CHEST PORT 1 VIEW    HISTORY:  Endotracheal tube positioning.     COMPARISON:  2022    FINDINGS:   The cardiac silhouette is normal in size. The pulmonary vasculature is  normal.  There is some abnormal increased density at the left lung  base consistent with atelectasis or pneumonia. No pleural effusion or  pneumothorax. There is an endotracheal tube is tip approximately 4 cm  above the chela. There is a nasogastric tube  passed into the stomach.      Impression    IMPRESSION: Endotracheal tube with its tip approximately 4 cm above  the chela    DAT CEE MD         SYSTEM ID:  [id number]   XR Chest Port 1 View    Narrative    PROCEDURE:  XR CHEST PORT 1 VIEW    HISTORY:  line placement.     COMPARISON:  6/21/2022 at 1610 hours    FINDINGS:   The cardiac silhouette is normal in size. The pulmonary vasculature is  normal.  There is some abnormal increased density at the left lung  base consistent with atelectasis or pneumonia. Central venous  catheters been placed with its tip in the superior vena cava. No  complication of central line insertion is seen. There is a nasogastric  tube passing into the stomach. There is an endotracheal tube with its  tip 4 cm above the chela      Impression    IMPRESSION:  Central venous catheter with its tip in the superior vena  cava. No complication of central line insertion is seen      DAT CEE MD         SYSTEM ID:  [id number]

## 2022-06-23 LAB
ANION GAP SERPL CALCULATED.3IONS-SCNC: 8 MMOL/L (ref 3–14)
BUN SERPL-MCNC: 31 MG/DL (ref 7–25)
C DIFF TOX B STL QL: NEGATIVE
CALCIUM SERPL-MCNC: 7.8 MG/DL (ref 8.6–10.3)
CHLORIDE BLD-SCNC: 107 MMOL/L (ref 98–107)
CO2 SERPL-SCNC: 28 MMOL/L (ref 21–31)
CREAT SERPL-MCNC: 1.41 MG/DL (ref 0.7–1.3)
ERYTHROCYTE [DISTWIDTH] IN BLOOD BY AUTOMATED COUNT: 13.3 % (ref 10–15)
GFR SERPL CREATININE-BSD FRML MDRD: 52 ML/MIN/1.73M2
GLUCOSE BLD-MCNC: 135 MG/DL (ref 70–105)
GLUCOSE BLDC GLUCOMTR-MCNC: 127 MG/DL (ref 70–99)
HCT VFR BLD AUTO: 34.9 % (ref 40–53)
HGB BLD-MCNC: 11.5 G/DL (ref 13.3–17.7)
LACTATE SERPL-SCNC: 1.1 MMOL/L (ref 0.7–2)
MAGNESIUM SERPL-MCNC: 1.7 MG/DL (ref 1.9–2.7)
MAGNESIUM SERPL-MCNC: 1.8 MG/DL (ref 1.9–2.7)
MCH RBC QN AUTO: 34.1 PG (ref 26.5–33)
MCHC RBC AUTO-ENTMCNC: 33 G/DL (ref 31.5–36.5)
MCV RBC AUTO: 104 FL (ref 78–100)
PHOSPHATE SERPL-MCNC: 2.5 MG/DL (ref 2.5–5)
PLATELET # BLD AUTO: 177 10E3/UL (ref 150–450)
POTASSIUM BLD-SCNC: 3.2 MMOL/L (ref 3.5–5.1)
POTASSIUM BLD-SCNC: 4.9 MMOL/L (ref 3.5–5.1)
RBC # BLD AUTO: 3.37 10E6/UL (ref 4.4–5.9)
RIBOTYPE 027/NAP1/BI: NORMAL
SODIUM SERPL-SCNC: 143 MMOL/L (ref 134–144)
WBC # BLD AUTO: 8.3 10E3/UL (ref 4–11)

## 2022-06-23 PROCEDURE — 250N000011 HC RX IP 250 OP 636: Performed by: FAMILY MEDICINE

## 2022-06-23 PROCEDURE — C9113 INJ PANTOPRAZOLE SODIUM, VIA: HCPCS | Performed by: INTERNAL MEDICINE

## 2022-06-23 PROCEDURE — 258N000001 HC RX 258: Performed by: FAMILY MEDICINE

## 2022-06-23 PROCEDURE — 83605 ASSAY OF LACTIC ACID: CPT | Performed by: FAMILY MEDICINE

## 2022-06-23 PROCEDURE — 250N000011 HC RX IP 250 OP 636: Performed by: INTERNAL MEDICINE

## 2022-06-23 PROCEDURE — 99291 CRITICAL CARE FIRST HOUR: CPT | Performed by: FAMILY MEDICINE

## 2022-06-23 PROCEDURE — 36415 COLL VENOUS BLD VENIPUNCTURE: CPT | Performed by: INTERNAL MEDICINE

## 2022-06-23 PROCEDURE — 83735 ASSAY OF MAGNESIUM: CPT | Performed by: FAMILY MEDICINE

## 2022-06-23 PROCEDURE — 85018 HEMOGLOBIN: CPT | Performed by: FAMILY MEDICINE

## 2022-06-23 PROCEDURE — 258N000003 HC RX IP 258 OP 636: Performed by: FAMILY MEDICINE

## 2022-06-23 PROCEDURE — 87506 IADNA-DNA/RNA PROBE TQ 6-11: CPT | Performed by: FAMILY MEDICINE

## 2022-06-23 PROCEDURE — 84132 ASSAY OF SERUM POTASSIUM: CPT | Performed by: INTERNAL MEDICINE

## 2022-06-23 PROCEDURE — 83735 ASSAY OF MAGNESIUM: CPT | Performed by: STUDENT IN AN ORGANIZED HEALTH CARE EDUCATION/TRAINING PROGRAM

## 2022-06-23 PROCEDURE — 250N000013 HC RX MED GY IP 250 OP 250 PS 637: Performed by: FAMILY MEDICINE

## 2022-06-23 PROCEDURE — 250N000013 HC RX MED GY IP 250 OP 250 PS 637: Performed by: INTERNAL MEDICINE

## 2022-06-23 PROCEDURE — 84100 ASSAY OF PHOSPHORUS: CPT | Performed by: FAMILY MEDICINE

## 2022-06-23 PROCEDURE — 87493 C DIFF AMPLIFIED PROBE: CPT | Performed by: FAMILY MEDICINE

## 2022-06-23 PROCEDURE — 82310 ASSAY OF CALCIUM: CPT | Performed by: FAMILY MEDICINE

## 2022-06-23 PROCEDURE — 200N000001 HC R&B ICU

## 2022-06-23 PROCEDURE — 250N000009 HC RX 250: Performed by: FAMILY MEDICINE

## 2022-06-23 PROCEDURE — 94003 VENT MGMT INPAT SUBQ DAY: CPT

## 2022-06-23 PROCEDURE — 999N000157 HC STATISTIC RCP TIME EA 10 MIN

## 2022-06-23 PROCEDURE — 36415 COLL VENOUS BLD VENIPUNCTURE: CPT | Performed by: FAMILY MEDICINE

## 2022-06-23 RX ORDER — LORAZEPAM 1 MG/1
1 TABLET ORAL 3 TIMES DAILY
Status: DISCONTINUED | OUTPATIENT
Start: 2022-06-23 | End: 2022-06-27

## 2022-06-23 RX ORDER — POTASSIUM CHLORIDE 7.45 MG/ML
10 INJECTION INTRAVENOUS
Status: COMPLETED | OUTPATIENT
Start: 2022-06-23 | End: 2022-06-23

## 2022-06-23 RX ADMIN — LORAZEPAM 1 MG: 1 TABLET ORAL at 22:54

## 2022-06-23 RX ADMIN — TAZOBACTAM SODIUM AND PIPERACILLIN SODIUM 4.5 G: 500; 4 INJECTION, SOLUTION INTRAVENOUS at 06:05

## 2022-06-23 RX ADMIN — MIDAZOLAM 2 MG: 1 INJECTION INTRAMUSCULAR; INTRAVENOUS at 23:09

## 2022-06-23 RX ADMIN — DEXTROSE AND SODIUM CHLORIDE: 5; 450 INJECTION, SOLUTION INTRAVENOUS at 02:55

## 2022-06-23 RX ADMIN — HYDROMORPHONE HYDROCHLORIDE 0.5 MG: 1 INJECTION, SOLUTION INTRAMUSCULAR; INTRAVENOUS; SUBCUTANEOUS at 06:05

## 2022-06-23 RX ADMIN — METHYLPREDNISOLONE SODIUM SUCCINATE 40 MG: 40 INJECTION, POWDER, FOR SOLUTION INTRAMUSCULAR; INTRAVENOUS at 09:10

## 2022-06-23 RX ADMIN — FOLIC ACID: 5 INJECTION, SOLUTION INTRAMUSCULAR; INTRAVENOUS; SUBCUTANEOUS at 09:16

## 2022-06-23 RX ADMIN — CHLORHEXIDINE GLUCONATE 15 ML: 1.2 RINSE ORAL at 09:02

## 2022-06-23 RX ADMIN — POTASSIUM CHLORIDE 10 MEQ: 7.46 INJECTION, SOLUTION INTRAVENOUS at 15:19

## 2022-06-23 RX ADMIN — Medication 25 MCG/HR: at 08:54

## 2022-06-23 RX ADMIN — MIDAZOLAM 2 MG: 1 INJECTION INTRAMUSCULAR; INTRAVENOUS at 07:59

## 2022-06-23 RX ADMIN — TAZOBACTAM SODIUM AND PIPERACILLIN SODIUM 4.5 G: 500; 4 INJECTION, SOLUTION INTRAVENOUS at 12:03

## 2022-06-23 RX ADMIN — POTASSIUM CHLORIDE 10 MEQ: 7.46 INJECTION, SOLUTION INTRAVENOUS at 14:11

## 2022-06-23 RX ADMIN — CHLORHEXIDINE GLUCONATE 15 ML: 1.2 RINSE ORAL at 21:31

## 2022-06-23 RX ADMIN — PROPOFOL 25 MCG/KG/MIN: 10 INJECTION, EMULSION INTRAVENOUS at 17:22

## 2022-06-23 RX ADMIN — Medication 10 MG: at 07:40

## 2022-06-23 RX ADMIN — MIDAZOLAM 2 MG: 1 INJECTION INTRAMUSCULAR; INTRAVENOUS at 23:18

## 2022-06-23 RX ADMIN — POTASSIUM CHLORIDE 10 MEQ: 7.46 INJECTION, SOLUTION INTRAVENOUS at 11:32

## 2022-06-23 RX ADMIN — PROPOFOL 30 MCG/KG/MIN: 10 INJECTION, EMULSION INTRAVENOUS at 09:13

## 2022-06-23 RX ADMIN — TAZOBACTAM SODIUM AND PIPERACILLIN SODIUM 4.5 G: 500; 4 INJECTION, SOLUTION INTRAVENOUS at 00:54

## 2022-06-23 RX ADMIN — ENOXAPARIN SODIUM 150 MG: 150 INJECTION SUBCUTANEOUS at 09:11

## 2022-06-23 RX ADMIN — Medication 10 MG: at 02:55

## 2022-06-23 RX ADMIN — Medication 10 MG: at 06:10

## 2022-06-23 RX ADMIN — POTASSIUM CHLORIDE 10 MEQ: 7.46 INJECTION, SOLUTION INTRAVENOUS at 12:33

## 2022-06-23 RX ADMIN — PANTOPRAZOLE SODIUM 40 MG: 40 INJECTION, POWDER, FOR SOLUTION INTRAVENOUS at 06:39

## 2022-06-23 RX ADMIN — TAZOBACTAM SODIUM AND PIPERACILLIN SODIUM 4.5 G: 500; 4 INJECTION, SOLUTION INTRAVENOUS at 17:50

## 2022-06-23 RX ADMIN — DEXTROSE AND SODIUM CHLORIDE: 5; 450 INJECTION, SOLUTION INTRAVENOUS at 16:54

## 2022-06-23 RX ADMIN — Medication 25 MCG: at 23:27

## 2022-06-23 RX ADMIN — Medication 25 MCG: at 10:34

## 2022-06-23 ASSESSMENT — ACTIVITIES OF DAILY LIVING (ADL)
ADLS_ACUITY_SCORE: 46
ADLS_ACUITY_SCORE: 52

## 2022-06-23 NOTE — PROGRESS NOTES
Pt resting comfortable on currentl sedation. Will open eyes spontaneously, withdraws x4. VSS on current phenyl rate 0.05.

## 2022-06-23 NOTE — PROGRESS NOTES
Patient becoming less restless/agitated upon initiated fentanyl infusion. Tremors have ceased, blood pressure down to 140's/80's. Will continue to monitor closely

## 2022-06-23 NOTE — PROGRESS NOTES
Tele ICU Note:    72 yom admitted initially with etoh withdrawal and CHARLOTTE but ultimately required on intubation on 6/21 for obtundation in the setting of new PE and pneumonia.    Vent 16/450/5/25%     Phenyl off at 3 am, briefly on again for about 45 minutes this morning and now normotensive off of phenyl since 7:30 am.    Per RN and Dr. Shelton, patient is increasingly agitated, tremulous and requiring more sedation.  Also hypertensive, concern for persistent EtOH withdrawal      Abbreviated Assessment and Plan:    # Acute hypoxic respiratory failure secondary to below  # PE  # Pneumonia  -vent settings minimal; will defer SBT today 2/2 increasing sedation needs and concern for ongoing withdrawal  -RASS goal -1/-2  -Cont. LMWH for PE, Zosyn for pneumonia    # Shock - likely distributive etiology; TTE demonstrates essentially normal biventricular function  -off phenyl since 7:30 am today, continue to monitor hemodynamics    # Concern for ongoing EtOH withdrawal  -continue propofol gtt  -will add fentanyl gtt for restlessness and analgosedation  -consider adding PO benzos   -if needed, would also consider adjunctive therapy w/ gabapentin or valproic acid      D/w Dr. Shelton and bedside RN    Please call tele if questions.    Rupa Nuñez MD  Pulmonary and Critical Care Medicine      Addendum - called [phone] regarding bradycardia.  Heart rate upper 40-low 50s late this afternoon.  Similar occurrence yesterday afternoon while on propofol and phenylephrine.  Suspect this is related to propofol.  Remains hemodynamically stable off of pressors.  Will tolerate bradycardia for now so long as not sustained at less than 45 or starts to show signs of hypotension.  If ongoing concern for bradycardia, would discontinue propofol in favor of starting versed gtt.

## 2022-06-23 NOTE — PROGRESS NOTES
Bilateral soft wrist restraints continued for ETT protection and all other lines and tube protection.

## 2022-06-23 NOTE — PROGRESS NOTES
Update given to Dr. Mata, uneventful night, pt off pressors, order received for lab work, updated regarding bloody looking, dark red OG gastric output.

## 2022-06-23 NOTE — PROGRESS NOTES
[facility]    Medicine Progress Note - Hospitalist Service    Date of Admission:  6/17/2022    Assessment & Plan        Antonio Rutherford is a 72 year old male admitted on 6/17/2022. He presented from the detention for concerns of possible kidney stone versus sepsis.     Patient was admitted to the detention 7 days PTA.  He is a daily drinker of whiskey and was treated for alcohol withdrawal at the detention and then started on Bactrim for possible UTI 4 days PTA.  He had extremely poor oral intake PTA (20 oz in 7d per detention nurse report).     He was found in the emergency department to have acute kidney injury likely due to ATN secondary to Bactrim.  He is clinically dehydrated and likely still with a component of alcohol withdrawal.  Also has been given ibuprofen.  He did not appear to have an active infection at that time.     Patient was admitted treated with thiamine, folate and IV fluids, strict I's and O's and close monitoring.     Active Problems:    Alcohol withdrawal (H) with alcohol dependence.   Assessment: Sedated and intubated in the ICU on 6/21/22 for somnolence. Ammonia level was normal, ABG did not show significant CO2 retention, head CT and brain MRI neg for acute findings.  May still be having withdrawal, unclear at this time.  Became significantly more agitated after sedation was weaned this morning and is not safe to protect airway at this time.  -Continue with sedation and intubation, appreciate telemetry medicine recommendations and care    Acute PE, POA  -lovenox 1.5mg/kg/d  -We will need anticoagulation when able to take oral medication    ATN (acute tubular necrosis) (H) with acute kidney injury.  Improved today.    Assessment: Bactrim and ibuprofen potentially causing ATN.  Also significant prerenal component as evidenced by improvement with hydration.  Continues to have some mild hematuria.    Plan: monitor labs. Check echo for volume status. Avoid NSAIDs and nephrotoxins as able.  Renally dose zosyn.     ? Of UTI. POA. Was given bactrim PTA. Urine culture negative. On zosyn for pneumonia which will cover    Hypotension likely due to propofol sedation, on pressors.  -repeat lactate  -continue zosyn  -check procalcitonin      Dehydration    Assessment: Resolved.      Plan: D5 1/2NS at 50ml/hr as he is intubated/NPO. Appears hypovolemic. Repeat echo today now that HR is improved.        CHARLOTTE (acute kidney injury) (H), resolved.  Present on admission.    Assessment: Likely multifactorial with prerenal azotemia and possible ATN.    Plan: Recheck in a.m.  Hold all NSAIDs.      Sinus tachycardia, improved. Got amiodarone x24 hours. Now HR regular and controlled.    Assessment:  May be due to withdrawal, but unclear.     Plan: repeat echo. Monitor volume status.      Encephalopathy, metabolic. CT and MRI head/brain negative for acute findings.   Assessment: Progressive worsening which I would have expected to improve as he is no longer on sedating medications. ABG stable. Ammonia was normal.   Plan: Continue current treatment plan.      diarrhea  -C. difficile negative.    -Stool studies pending    Smoker. Cannot exclude COPD  -steroids    Stage 1 pressure ulcer of buttocks  Heel pressure ulcer stage 1. Left foot.   -offload  -rectal tube place  -dressings    Total critical care time 50 minutes       Diet: Combination Diet Regular Diet Adult    DVT Prophylaxis: Enoxaparin (Lovenox) SQ  Escobedo Catheter: PRESENT, indication: Strict 1-2 Hour I&O;Wound Healing  Central Lines: PRESENT  CVC TRIPLE LUMEN Right External jugular-Site Assessment: WDL  Cardiac Monitoring: ACTIVE order. Indication: Electrolyte Imbalance (24 hours)- Magnesium <1.3 mg/ml; Potassium < =2.8 or > 5.5 mg/ml  Code Status: Full Code      Disposition Plan       Several days    The patient's care was discussed with the Patient and Patient's Family.    Erna Shelton DO  Hospitalist Service  [facility]  Securely  message with the [employer] Web Console (learn more here)  Text page via [employer] Paging/Directory         Clinically Significant Risk Factors Present on Admission                      ______________________________________________________________________    Interval History   Who was doing better on lower sedation early this morning but then became significantly agitated and hypertensive with tremulousness.  Concern for continued withdrawal.  Unable to protect airway.  As such we will remain intubated at this time.  Discussed with nursing staff as well as telemetry ICU staff.  We will add additional fentanyl to help with sedation and hopefully decrease propofol need.  Added Ativan scheduled which can be given through OG tube or IV.  Continues to have hematuria and significant secretions.  I updated his brother yesterday.    Data reviewed today: I reviewed all medications, new labs and imaging results over the last 24 hours. I personally reviewed no images or EKG's today.    Physical Exam   Vital Signs: Temp: 98.9  F (37.2  C) Temp src: Temporal BP: (!) 167/99 Pulse: 72   Resp: 16 SpO2: 96 % O2 Device: Mechanical Ventilator    Weight: 210 lbs 15.68 oz  General Appearance: Sedated and intubated.  Calm on my exam.  Respiratory: Coarse anteriorly.  Cardiovascular: Irregular.  Rate controlled.  GI: Abdomen soft, nontender, nondistended  Skin: Warm and dry.  Skin breakdown as noted in yesterday's documentation of the left buttocks and the right heel  Other:      Data   Recent Labs   Lab 06/23/22  0651 06/22/22  0812 06/22/22  0542 06/21/22  0523 06/19/22  1143 06/19/22  1139   WBC 8.3  --  14.1* 13.8*   < >  --    HGB 11.5*  --  14.5 15.4   < >  --    *  --  104* 105*   < >  --      --  213 229   < >  --    INR  --   --   --   --   --  1.14     --  140 137   < >  --    POTASSIUM 3.2*  --  3.5 4.1   < >  --    CHLORIDE 107  --  108* 102   < >  --    CO2 28  --  21 25   < >  --    BUN 31*  --  34* 25   < >  --     CR 1.41*  --  1.63* 1.53*   < >  --    ANIONGAP 8  --  11 10   < >  --    PAWAN 7.8*  --  8.6 9.6   < >  --    * 103* 118* 196*   < >  --    ALBUMIN  --   --  3.0* 4.0  --   --    PROTTOTAL  --   --  6.0* 7.5  --   --    BILITOTAL  --   --  1.2* 1.6*  --   --    ALKPHOS  --   --  40 41  --   --    ALT  --   --  16 17  --   --    AST  --   --  56* 33  --   --     < > = values in this interval not displayed.     No results found for this or any previous visit (from the past 24 hour(s)).

## 2022-06-23 NOTE — PLAN OF CARE
PT/OT deferred today due to increased agitation and increased sedation needed. Will attempt to see when more appropriate for rehab.

## 2022-06-23 NOTE — PROGRESS NOTES
Received report, pt not responsive to pain, not opening eyes with sternal rub at this time. Titrating sedation for RASS 0 to -1. Phenyl infusing for MAP >65. Tolerating vent.  Urine output dark brown with sediments, quantity sufficient urine output. Removed piv from R ankle d/t streaking with flush. CVC in place, dressing intact. CVC infusing all medications. Pulled back OG to 55 cm, was pushed into 75cm. Placement checked, OG with large amt of bile/gastric/dark output. ETT suction large amt of white/clear/thick sputum. Large amount of clear oral secretions suctioned, mouth care provided q2hrs. Back/butt/sacrum pale, red, purple, skin ointment/barrier applied. Pt repositioned, extremities elevated. Skin precautions in place.

## 2022-06-24 ENCOUNTER — APPOINTMENT (OUTPATIENT)
Dept: CT IMAGING | Facility: OTHER | Age: 72
DRG: 673 | End: 2022-06-24
Attending: FAMILY MEDICINE
Payer: MEDICARE

## 2022-06-24 ENCOUNTER — APPOINTMENT (OUTPATIENT)
Dept: PHYSICAL THERAPY | Facility: OTHER | Age: 72
DRG: 673 | End: 2022-06-24
Attending: FAMILY MEDICINE
Payer: MEDICARE

## 2022-06-24 ENCOUNTER — APPOINTMENT (OUTPATIENT)
Dept: GENERAL RADIOLOGY | Facility: OTHER | Age: 72
DRG: 673 | End: 2022-06-24
Attending: FAMILY MEDICINE
Payer: MEDICARE

## 2022-06-24 ENCOUNTER — APPOINTMENT (OUTPATIENT)
Dept: OCCUPATIONAL THERAPY | Facility: OTHER | Age: 72
DRG: 673 | End: 2022-06-24
Attending: FAMILY MEDICINE
Payer: MEDICARE

## 2022-06-24 LAB
ALBUMIN SERPL-MCNC: 2.6 G/DL (ref 3.5–5.7)
ALP SERPL-CCNC: 27 U/L (ref 34–104)
ALT SERPL W P-5'-P-CCNC: 12 U/L (ref 7–52)
ANION GAP SERPL CALCULATED.3IONS-SCNC: 8 MMOL/L (ref 3–14)
AST SERPL W P-5'-P-CCNC: 22 U/L (ref 13–39)
BACTERIA UR CULT: NO GROWTH
BASE EXCESS BLDA CALC-SCNC: 2.3 MMOL/L (ref -9–1.8)
BASE EXCESS BLDV CALC-SCNC: 2.2 MMOL/L (ref -7.7–1.9)
BILIRUB SERPL-MCNC: 0.6 MG/DL (ref 0.3–1)
BUN SERPL-MCNC: 27 MG/DL (ref 7–25)
C COLI+JEJUNI+LARI FUSA STL QL NAA+PROBE: NOT DETECTED
CALCIUM SERPL-MCNC: 8.1 MG/DL (ref 8.6–10.3)
CHLORIDE BLD-SCNC: 106 MMOL/L (ref 98–107)
CO2 SERPL-SCNC: 25 MMOL/L (ref 21–31)
CREAT SERPL-MCNC: 1.21 MG/DL (ref 0.7–1.3)
EC STX1 GENE STL QL NAA+PROBE: NOT DETECTED
EC STX2 GENE STL QL NAA+PROBE: NOT DETECTED
ERYTHROCYTE [DISTWIDTH] IN BLOOD BY AUTOMATED COUNT: 13.4 % (ref 10–15)
GFR SERPL CREATININE-BSD FRML MDRD: 63 ML/MIN/1.73M2
GLUCOSE BLD-MCNC: 129 MG/DL (ref 70–105)
HCO3 BLD-SCNC: 27 MMOL/L (ref 21–28)
HCO3 BLDV-SCNC: 28 MMOL/L (ref 21–28)
HCT VFR BLD AUTO: 34.9 % (ref 40–53)
HGB BLD-MCNC: 11.6 G/DL (ref 13.3–17.7)
MAGNESIUM SERPL-MCNC: 1.6 MG/DL (ref 1.9–2.7)
MCH RBC QN AUTO: 35.4 PG (ref 26.5–33)
MCHC RBC AUTO-ENTMCNC: 33.2 G/DL (ref 31.5–36.5)
MCV RBC AUTO: 106 FL (ref 78–100)
NOROV GI+II ORF1-ORF2 JNC STL QL NAA+PR: NOT DETECTED
O2/TOTAL GAS SETTING VFR VENT: 24 %
O2/TOTAL GAS SETTING VFR VENT: 25 %
OXYHGB MFR BLD: 91 % (ref 92–100)
PCO2 BLD: 42 MM HG (ref 35–45)
PCO2 BLDV: 48 MM HG (ref 40–50)
PH BLD: 7.42 [PH] (ref 7.35–7.45)
PH BLDV: 7.38 [PH] (ref 7.32–7.43)
PHOSPHATE SERPL-MCNC: 2.6 MG/DL (ref 2.5–5)
PLATELET # BLD AUTO: 189 10E3/UL (ref 150–450)
PO2 BLD: 63 MM HG (ref 80–105)
PO2 BLDV: 44 MM HG (ref 25–47)
POTASSIUM BLD-SCNC: 3.6 MMOL/L (ref 3.5–5.1)
PROT SERPL-MCNC: 5.1 G/DL (ref 6.4–8.9)
RBC # BLD AUTO: 3.28 10E6/UL (ref 4.4–5.9)
RVA NSP5 STL QL NAA+PROBE: NOT DETECTED
SALMONELLA SP RPOD STL QL NAA+PROBE: NOT DETECTED
SHIGELLA SP+EIEC IPAH STL QL NAA+PROBE: NOT DETECTED
SODIUM SERPL-SCNC: 139 MMOL/L (ref 134–144)
V CHOL+PARA RFBL+TRKH+TNAA STL QL NAA+PR: NOT DETECTED
WBC # BLD AUTO: 8.3 10E3/UL (ref 4–11)
Y ENTERO RECN STL QL NAA+PROBE: NOT DETECTED

## 2022-06-24 PROCEDURE — 999N000253 HC STATISTIC WEANING TRIALS

## 2022-06-24 PROCEDURE — 250N000009 HC RX 250: Performed by: FAMILY MEDICINE

## 2022-06-24 PROCEDURE — 36415 COLL VENOUS BLD VENIPUNCTURE: CPT | Performed by: INTERNAL MEDICINE

## 2022-06-24 PROCEDURE — 80053 COMPREHEN METABOLIC PANEL: CPT | Performed by: FAMILY MEDICINE

## 2022-06-24 PROCEDURE — 258N000003 HC RX IP 258 OP 636: Performed by: FAMILY MEDICINE

## 2022-06-24 PROCEDURE — 999N000157 HC STATISTIC RCP TIME EA 10 MIN

## 2022-06-24 PROCEDURE — C9113 INJ PANTOPRAZOLE SODIUM, VIA: HCPCS | Performed by: INTERNAL MEDICINE

## 2022-06-24 PROCEDURE — 250N000013 HC RX MED GY IP 250 OP 250 PS 637: Performed by: INTERNAL MEDICINE

## 2022-06-24 PROCEDURE — 99291 CRITICAL CARE FIRST HOUR: CPT | Performed by: FAMILY MEDICINE

## 2022-06-24 PROCEDURE — 97166 OT EVAL MOD COMPLEX 45 MIN: CPT | Mod: GO | Performed by: OCCUPATIONAL THERAPIST

## 2022-06-24 PROCEDURE — 200N000001 HC R&B ICU

## 2022-06-24 PROCEDURE — 82805 BLOOD GASES W/O2 SATURATION: CPT | Performed by: FAMILY MEDICINE

## 2022-06-24 PROCEDURE — 85027 COMPLETE CBC AUTOMATED: CPT | Performed by: FAMILY MEDICINE

## 2022-06-24 PROCEDURE — 999N000259 HC STATISTIC EXTUBATION

## 2022-06-24 PROCEDURE — 250N000011 HC RX IP 250 OP 636: Performed by: FAMILY MEDICINE

## 2022-06-24 PROCEDURE — 71045 X-RAY EXAM CHEST 1 VIEW: CPT

## 2022-06-24 PROCEDURE — 250N000011 HC RX IP 250 OP 636: Performed by: INTERNAL MEDICINE

## 2022-06-24 PROCEDURE — 97530 THERAPEUTIC ACTIVITIES: CPT | Mod: GO | Performed by: OCCUPATIONAL THERAPIST

## 2022-06-24 PROCEDURE — 84100 ASSAY OF PHOSPHORUS: CPT | Performed by: INTERNAL MEDICINE

## 2022-06-24 PROCEDURE — G1010 CDSM STANSON: HCPCS

## 2022-06-24 PROCEDURE — 83735 ASSAY OF MAGNESIUM: CPT | Performed by: INTERNAL MEDICINE

## 2022-06-24 PROCEDURE — 97162 PT EVAL MOD COMPLEX 30 MIN: CPT | Mod: GP

## 2022-06-24 PROCEDURE — 97110 THERAPEUTIC EXERCISES: CPT | Mod: GP

## 2022-06-24 PROCEDURE — 250N000013 HC RX MED GY IP 250 OP 250 PS 637: Performed by: FAMILY MEDICINE

## 2022-06-24 PROCEDURE — 258N000001 HC RX 258: Performed by: FAMILY MEDICINE

## 2022-06-24 PROCEDURE — 36600 WITHDRAWAL OF ARTERIAL BLOOD: CPT | Performed by: FAMILY MEDICINE

## 2022-06-24 PROCEDURE — 82803 BLOOD GASES ANY COMBINATION: CPT | Performed by: INTERNAL MEDICINE

## 2022-06-24 RX ORDER — MAGNESIUM SULFATE HEPTAHYDRATE 40 MG/ML
2 INJECTION, SOLUTION INTRAVENOUS ONCE
Status: COMPLETED | OUTPATIENT
Start: 2022-06-24 | End: 2022-06-24

## 2022-06-24 RX ORDER — LISINOPRIL 20 MG/1
20 TABLET ORAL DAILY
Status: DISCONTINUED | OUTPATIENT
Start: 2022-06-24 | End: 2022-07-11

## 2022-06-24 RX ADMIN — DEXTROSE AND SODIUM CHLORIDE: 5; 450 INJECTION, SOLUTION INTRAVENOUS at 21:35

## 2022-06-24 RX ADMIN — TAZOBACTAM SODIUM AND PIPERACILLIN SODIUM 4.5 G: 500; 4 INJECTION, SOLUTION INTRAVENOUS at 11:43

## 2022-06-24 RX ADMIN — HYDRALAZINE HYDROCHLORIDE 10 MG: 20 INJECTION INTRAMUSCULAR; INTRAVENOUS at 13:24

## 2022-06-24 RX ADMIN — CHLORHEXIDINE GLUCONATE 15 ML: 1.2 RINSE ORAL at 09:12

## 2022-06-24 RX ADMIN — CLONIDINE HYDROCHLORIDE 0.1 MG: 0.1 TABLET ORAL at 17:19

## 2022-06-24 RX ADMIN — TAZOBACTAM SODIUM AND PIPERACILLIN SODIUM 4.5 G: 500; 4 INJECTION, SOLUTION INTRAVENOUS at 23:59

## 2022-06-24 RX ADMIN — Medication 25 MCG: at 07:34

## 2022-06-24 RX ADMIN — Medication 25 MCG: at 02:53

## 2022-06-24 RX ADMIN — CLONIDINE HYDROCHLORIDE 0.1 MG: 0.1 TABLET ORAL at 22:44

## 2022-06-24 RX ADMIN — TAZOBACTAM SODIUM AND PIPERACILLIN SODIUM 4.5 G: 500; 4 INJECTION, SOLUTION INTRAVENOUS at 05:45

## 2022-06-24 RX ADMIN — HYDROMORPHONE HYDROCHLORIDE 0.5 MG: 1 INJECTION, SOLUTION INTRAMUSCULAR; INTRAVENOUS; SUBCUTANEOUS at 23:59

## 2022-06-24 RX ADMIN — LORAZEPAM 1 MG: 1 TABLET ORAL at 21:44

## 2022-06-24 RX ADMIN — LORAZEPAM 1 MG: 1 TABLET ORAL at 05:45

## 2022-06-24 RX ADMIN — DIAZEPAM 10 MG: 5 TABLET ORAL at 22:44

## 2022-06-24 RX ADMIN — HYDRALAZINE HYDROCHLORIDE 10 MG: 20 INJECTION INTRAMUSCULAR; INTRAVENOUS at 20:49

## 2022-06-24 RX ADMIN — DEXTROSE AND SODIUM CHLORIDE: 5; 450 INJECTION, SOLUTION INTRAVENOUS at 03:22

## 2022-06-24 RX ADMIN — METHYLPREDNISOLONE SODIUM SUCCINATE 40 MG: 40 INJECTION, POWDER, FOR SOLUTION INTRAMUSCULAR; INTRAVENOUS at 09:02

## 2022-06-24 RX ADMIN — TAZOBACTAM SODIUM AND PIPERACILLIN SODIUM 4.5 G: 500; 4 INJECTION, SOLUTION INTRAVENOUS at 00:06

## 2022-06-24 RX ADMIN — ENOXAPARIN SODIUM 150 MG: 150 INJECTION SUBCUTANEOUS at 09:02

## 2022-06-24 RX ADMIN — TAZOBACTAM SODIUM AND PIPERACILLIN SODIUM 4.5 G: 500; 4 INJECTION, SOLUTION INTRAVENOUS at 17:29

## 2022-06-24 RX ADMIN — PANTOPRAZOLE SODIUM 40 MG: 40 INJECTION, POWDER, FOR SOLUTION INTRAVENOUS at 05:46

## 2022-06-24 RX ADMIN — MAGNESIUM SULFATE HEPTAHYDRATE 2 G: 40 INJECTION, SOLUTION INTRAVENOUS at 09:01

## 2022-06-24 RX ADMIN — FOLIC ACID: 5 INJECTION, SOLUTION INTRAMUSCULAR; INTRAVENOUS; SUBCUTANEOUS at 09:01

## 2022-06-24 ASSESSMENT — ACTIVITIES OF DAILY LIVING (ADL)
ADLS_ACUITY_SCORE: 46
ADLS_ACUITY_SCORE: 50
ADLS_ACUITY_SCORE: 50
ADLS_ACUITY_SCORE: 46
ADLS_ACUITY_SCORE: 54
ADLS_ACUITY_SCORE: 54
ADLS_ACUITY_SCORE: 50
ADLS_ACUITY_SCORE: 46
ADLS_ACUITY_SCORE: 50
ADLS_ACUITY_SCORE: 46

## 2022-06-24 NOTE — PROGRESS NOTES
Urine output dark boubacar/red with sediments. OG output dark red/brown. Appears bloody.   Attempted to give pt a sedation vacation at start of shift but he became tremorous, not tolerating, sedation restarted at half previous rate. Pt opened eyes to voice but would not follow commands.   Now propofol has been titrated off again d/t low HR. Pt RASS score still -2 at this time. Fentanyl continues to infuse at 25 mcg/hr.

## 2022-06-24 NOTE — PROGRESS NOTES
06/24/22 1421   Quick Adds   Type of Visit Initial PT Evaluation   Living Environment   People in Home   (pt was in halfway prior to admission)   Current Living Arrangements   (halfway)   Self-Care   Usual Activity Tolerance good   Current Activity Tolerance poor   Equipment Currently Used at Home none   Fall history within last six months no   General Information   Onset of Illness/Injury or Date of Surgery 06/17/22   Referring Physician Dr. Shelton   General Observations Pt able to open his eyes when prompted. Follows directions with bending elbows. Speech is unintelligible. PT only able to obtain history through previous provider notes. High blood pressure noted during activity. RN notified.   Cognition   Affect/Mental Status (Cognition) low arousal/lethargic   Orientation Status (Cognition) person;unable/difficult to assess   Follows Commands (Cognition) 25-49% accuracy;follows one-step commands   Integumentary/Edema   Integumentary/Edema Comments Edema noted in feet/ankles   Range of Motion (ROM)   ROM Comment Passive ROM of UEs noted to be limited due to tightness   Strength (Manual Muscle Testing)   Strength Comments significant weakness and limitation likely secondary to motor control and prior intubation   Bed Mobility   Bed Mobility Limitations decreased ability to use arms for pushing/pulling;decreased ability to use legs for bridging/pushing   Comment, (Bed Mobility) pt currently total assist for bed mobility   Transfers   Comment, (Transfers) Dependent transfers   Gait/Stairs (Locomotion)   Comment, (Gait/Stairs) not ambulating at this time   Clinical Impression   Criteria for Skilled Therapeutic Intervention Yes, treatment indicated   PT Diagnosis (PT) impaired mobility   Influenced by the following impairments cognition, weakness   Functional limitations due to impairments impaired bed mobility, impaired transfers, impaired mobility   Clinical Presentation (PT Evaluation Complexity) Evolving/Changing    Clinical Decision Making (Complexity) moderate complexity   Planned Therapy Interventions (PT) bed mobility training;gait training;strengthening;stretching;transfer training   Anticipated Equipment Needs at Discharge (PT) walker, rolling   Risk & Benefits of therapy have been explained evaluation/treatment results reviewed   PT Discharge Planning   PT Discharge Recommendation (DC Rec) Transitional Care Facility   PT Rationale for DC Rec Pt only moving select few distal extremity joints. Pt needs significant assist for improving mobility.   PT Brief overview of current status Able to open eyes when cued. Responds with unintelligible speech. Only able to wiggle toes and slightly flex elbows. No other movements from extremities. Pt hypertensive and discontinued session for the day due to BP at 192/109   Total Evaluation Time   Total Evaluation Time (Minutes) 15   Physical Therapy Goals   PT Frequency Daily   PT Predicted Duration/Target Date for Goal Attainment 07/08/22   PT Goals Bed Mobility;Transfers;Gait   PT: Bed Mobility Minimal assist   PT: Transfers Minimal assist   PT: Gait 25 feet;Rolling walker

## 2022-06-24 NOTE — PLAN OF CARE
"  Problem: Plan of Care - These are the overarching goals to be used throughout the patient stay.    Goal: Plan of Care Review/Shift Note  Description: The Plan of Care Review/Shift note should be completed every shift.  The Outcome Evaluation is a brief statement about your assessment that the patient is improving, declining, or no change.  This information will be displayed automatically on your shift note.  Outcome: Ongoing, Progressing  Goal: Patient-Specific Goal (Individualized)  Description: You can add care plan individualizations to a care plan. Examples of Individualization might be:  \"Parent requests to be called daily at 9am for status\", \"I have a hard time hearing out of my right ear\", or \"Do not touch me to wake me up as it startles me\".  Outcome: Ongoing, Progressing  Goal: Absence of Hospital-Acquired Illness or Injury  Outcome: Ongoing, Progressing  Intervention: Identify and Manage Fall Risk  Recent Flowsheet Documentation  Taken 6/23/2022 1600 by Anya Storm RN  Safety Promotion/Fall Prevention: fall prevention program maintained  Intervention: Prevent Skin Injury  Recent Flowsheet Documentation  Taken 6/23/2022 1600 by Anya Storm RN  Body Position:   turned   left   side-lying 30 degrees  Intervention: Prevent and Manage VTE (Venous Thromboembolism) Risk  Recent Flowsheet Documentation  Taken 6/23/2022 1600 by Anya Storm RN  Activity Management: bedrest  Goal: Optimal Comfort and Wellbeing  Outcome: Ongoing, Progressing  Goal: Readiness for Transition of Care  Outcome: Ongoing, Progressing     Problem: Fall Injury Risk  Goal: Absence of Fall and Fall-Related Injury  Outcome: Ongoing, Progressing  Intervention: Identify and Manage Contributors  Recent Flowsheet Documentation  Taken 6/23/2022 1600 by Anya Storm RN  Medication Review/Management: medications reviewed  Intervention: Promote Injury-Free Environment  Recent Flowsheet Documentation  Taken 6/23/2022 1600 by Emeterio" RONALDO Joyner  Safety Promotion/Fall Prevention: fall prevention program maintained     Problem: Behavioral Health Comorbidity  Goal: Maintenance of Behavioral Health Symptom Control  Outcome: Ongoing, Progressing  Intervention: Maintain Behavioral Health Symptom Control  Recent Flowsheet Documentation  Taken 6/23/2022 1600 by Anya Storm RN  Medication Review/Management: medications reviewed     Problem: Alcohol Withdrawal  Goal: Alcohol Withdrawal Symptom Control  Outcome: Ongoing, Progressing     Problem: Restraint, Nonbehavioral (Nonviolent)  Goal: Absence of Harm or Injury  Outcome: Ongoing, Progressing  Intervention: Protect Skin and Joint Integrity  Recent Flowsheet Documentation  Taken 6/23/2022 1600 by Anya Storm RN  Body Position:   turned   left   side-lying 30 degrees     Problem: Plan of Care - These are the overarching goals to be used throughout the patient stay.    Goal: Absence of Hospital-Acquired Illness or Injury  Intervention: Identify and Manage Fall Risk  Recent Flowsheet Documentation  Taken 6/23/2022 1600 by Anya Storm RN  Safety Promotion/Fall Prevention: fall prevention program maintained  Intervention: Prevent Skin Injury  Recent Flowsheet Documentation  Taken 6/23/2022 1600 by Anya Storm RN  Body Position:   turned   left   side-lying 30 degrees  Intervention: Prevent and Manage VTE (Venous Thromboembolism) Risk  Recent Flowsheet Documentation  Taken 6/23/2022 1600 by Anya Storm RN  Activity Management: bedrest     Problem: Fall Injury Risk  Goal: Absence of Fall and Fall-Related Injury  Intervention: Identify and Manage Contributors  Recent Flowsheet Documentation  Taken 6/23/2022 1600 by Anya Storm RN  Medication Review/Management: medications reviewed  Intervention: Promote Injury-Free Environment  Recent Flowsheet Documentation  Taken 6/23/2022 1600 by Anya Storm RN  Safety Promotion/Fall Prevention: fall prevention program maintained     Problem:  Behavioral Health Comorbidity  Goal: Maintenance of Behavioral Health Symptom Control  Intervention: Maintain Behavioral Health Symptom Control  Recent Flowsheet Documentation  Taken 6/23/2022 1600 by Anya Storm RN  Medication Review/Management: medications reviewed     Problem: Restraint, Nonbehavioral (Nonviolent)  Goal: Absence of Harm or Injury  Intervention: Protect Skin and Joint Integrity  Recent Flowsheet Documentation  Taken 6/23/2022 1600 by Anya Storm, RN  Body Position:   turned   left   side-lying 30 degrees   Goal Outcome Evaluation:

## 2022-06-24 NOTE — PROGRESS NOTES
Sedation has been stopped. Spontaneous breathing trial started at 0914 with Vent settings of 5/5 and 25%. Pt passed initial two minute trial. Will continue to monitor.    Marisela Mendes, RT

## 2022-06-24 NOTE — PROGRESS NOTES
Shift Summary  Pt is intubated and on Mechanical Ventilation with current settings of CMV-16/450/25%/+5. Suctioning has presented clear/thin secretions. No further interventions have been needed at this time.    Marisela Mendes, RT

## 2022-06-24 NOTE — PROGRESS NOTES
Clinical Nutrition /Reassessment     Assessment:   Client History:  Pt extubated today, 6/24. Was intubated since 6/21. Minimal oral intakes since admit and prior during his stay alf, refused to eat. Currently not awake enough to consume oral nutrition per nursing staff. Will bring supplements and thickener to unit and staff will provide as able when appropriate.   Diet Order:  IDDSI level 6 foods (soft and bite sized), level 3 fluids (moderately thick).   Oral Intake:  Intubated from 6/21-6/24, refusing to eat or eating minimally prior to that  Weight:   Wt Readings from Last 10 Encounters:   06/24/22 100.5 kg (221 lb 9 oz)   04/18/19 104.8 kg (231 lb)   03/20/19 104.3 kg (230 lb)   03/12/19 104.3 kg (230 lb)   09/12/17 99.8 kg (220 lb)   06/18/15 100.7 kg (222 lb)   06/16/15 100.2 kg (221 lb)   11/29/12 90.7 kg (200 lb)     Malnutrition Criteria:  (Need to have 2 indicators to qualify recommendation)  Energy Intake:  Acute Severe: </= 50% of estimated energy requirement for >/= 5 days  Interpretation of Weight Loss:  No significant weight loss in past year noted per chart review and lack of information in the time frame  Physical Findings:  No significant findings  Reduced  Strength:  noted weakness, likely reduced with current illness    Recommended Nutrition Diagnosis:   Severe Malnutrition in the context of acute illness or injury - based on AND/ASPEN Clinical Characterstics of Malnutrition May 2012      Nutrition Education: Nutrition education will be provided as appropriate.    Nutrition Diagnosis: Oral or Nutrition Support Intake:  inadequate energy intakes related to refusal to consume oral nutrition/NPO status with intubation as evidenced by no intakes x past 4 days and prior to that, poor intakes    Intervention:  Nutrition Prescription:     Nutrition Intervention(s):Recommended general, healthful diet --IDDSI level 6 foods (soft and bite sized), level 3 fluids (moderately thick).  1. Supplements:  offer supplements as he allows and tolerates    Nutrition Goal(s):  1. Pt will consume 50% or more at meals  2. Pt will not have unplanned wt loss during hospitalization   3. Pt will tolerate diet as ordered    Monitoring and Evaluation:   Food Intake, tolerance, weight     Discharge Recommendation:   Nutrition Discharge Planning  Will address closer to discharge     RD will reassess in within 1-5 days or sooner.  Leidy Joyce RD on 6/24/2022 at 2:36 PM

## 2022-06-24 NOTE — PROGRESS NOTES
"/68   Pulse 73   Temp 96.9  F (36.1  C) (Tympanic)   Resp 15   Ht 1.778 m (5' 10\")   Wt 100.5 kg (221 lb 9 oz)   SpO2 95%   BMI 31.79 kg/m      Sedated stopped 0850.  Pt tolerating new vent settings, O2 stable at 94%  Carolina Menjivar RN.............................6/24/2022 9:21 AM    "

## 2022-06-24 NOTE — PLAN OF CARE
Problem: Restraint, Nonbehavioral (Nonviolent)  Goal: Absence of Harm or Injury  Outcome: Met  Intervention: Protect Skin and Joint Integrity  Recent Flowsheet Documentation  Taken 6/24/2022 1530 by Elizabeth Szymanski RN  Body Position:   turned   left   supine, legs elevated   Goal Outcome Evaluation:      Patient has been extubated since this am, no longer has a need for restraints

## 2022-06-24 NOTE — PROGRESS NOTES
Sat patient upright and tried to have him drink water, unable to take it through a straw but when water placed into his mouth he was able to swallow it without any choking. Tried pudding and he was able to swallow it without issues, prn BP medication given for /103

## 2022-06-24 NOTE — PROGRESS NOTES
Pt more awake, nods appropriately, reoriented to situation, denies pain, still having tremor when sedation decreased, bear hugger in place to help maintain temp. Restarting propofol at low rate so pt can be ready for breathing trial in am.

## 2022-06-24 NOTE — PROGRESS NOTES
Heart rate sustaining in the upper 40s, updated Tele ICU. Heart rate ok unless sustaining below 45. Remains on propofal and fentanyl for sedation.

## 2022-06-24 NOTE — PLAN OF CARE
"BP (!) 167/91   Pulse 83   Temp 97.6  F (36.4  C) (Tympanic)   Resp 30   Ht 1.778 m (5' 10\")   Wt 100.5 kg (221 lb 9 oz)   SpO2 95%   BMI 31.79 kg/m      Pt tolerates NC 2L.  Garbled speech, unable to make needs known.  Unable to move extremities, pt/ot consult ordered.  Pressure injury stable with barrier cream and foam dressings.  Carolina Menjivar RN.............................6/24/2022 12:10 PM      "

## 2022-06-24 NOTE — PROGRESS NOTES
[facility]    Medicine Progress Note - Hospitalist Service    Date of Admission:  6/17/2022    Assessment & Plan        Antonio Rutherford is a 72 year old male admitted on 6/17/2022. He presented from the senior living for concerns of possible kidney stone versus sepsis.     Patient was admitted to the senior living 7 days PTA.  He is a daily drinker of whiskey and was treated for alcohol withdrawal at the senior living and then started on Bactrim for possible UTI 4 days PTA.  He had extremely poor oral intake PTA (20 oz in 7d per senior living nurse report).     He was found in the emergency department to have acute kidney injury likely due to ATN secondary to Bactrim.  He is clinically dehydrated and likely still with a component of alcohol withdrawal.  Also has been given ibuprofen.  He did not appear to have an active infection at that time.     Patient was admitted treated with thiamine, folate and IV fluids, strict I's and O's and close monitoring.     Active Problems:    Alcohol withdrawal (H) with alcohol dependence.   Assessment: Sedated and intubated in the ICU on 6/21/22 for somnolence. Ammonia level was normal, ABG did not show significant CO2 retention, head CT and brain MRI neg for acute findings.  May still be having withdrawal, unclear at this time.  Did much better with sedation weaning this morning.  Plan to extubate today.  Appreciate telemetry ICU recommendations and care.  -Plan to extubate today.  Continue to monitor and treat for withdrawal-    Acute PE, POA  -lovenox 1.5mg/kg/d  -We will need anticoagulation when able to take oral medication    CHARLOTTE due to ATN (acute tubular necrosis) (H), resolved today.  Improved today.    Assessment: Bactrim and ibuprofen potentially causing ATN.  Also significant prerenal component as evidenced by improvement with hydration.  Continues to have some mild hematuria.    Plan: monitor labs. Check echo for volume status. Avoid NSAIDs and nephrotoxins as able. Renally dose  zosyn.     ? Of UTI. POA. Was given bactrim PTA. Urine culture negative. On zosyn for pneumonia which will cover. No growth in urine culture. Blood cultures negative.     Hypotension likely due to propofol sedation. Off pressors and sedation at this time.       Dehydration    Assessment: Resolved.      Plan: D5 1/2NS at 50ml/hr as he is intubated/NPO. Adjust as he is able to tolerate PO.       CHARLOTTE (acute kidney injury) (H), resolved.  Present on admission.    Assessment: Likely multifactorial with prerenal azotemia and possible ATN.    Plan: Recheck in a.m.  Hold all NSAIDs.      Sinus tachycardia, improved. Got amiodarone x24 hours. Now HR regular and controlled.    Assessment:  May be due to withdrawal, but unclear.     Plan: continue tele/ICU today     Encephalopathy, metabolic. CT and MRI head/brain negative for acute findings.   Assessment: Progressive worsening which I would have expected to improve as he is no longer on sedating medications. ABG stable. Ammonia was normal.   Plan: Continue current treatment plan.      diarrhea  -C. difficile negative.    -Stool studies pending    Smoker. Cannot exclude COPD  -steroids    Stage 1 pressure ulcer of buttocks  Heel pressure ulcer stage 1. Left foot.   -offload  -rectal tube place  -dressings    Total critical care time 50 minutes         Diet: Combination Diet Regular Diet Adult    DVT Prophylaxis: Enoxaparin (Lovenox) SQ  Escobedo Catheter: PRESENT, indication: Strict 1-2 Hour I&O;Wound Healing  Central Lines: PRESENT  CVC TRIPLE LUMEN Right External jugular-Site Assessment: WDL  Cardiac Monitoring: ACTIVE order. Indication: Electrolyte Imbalance (24 hours)- Magnesium <1.3 mg/ml; Potassium < =2.8 or > 5.5 mg/ml  Code Status: Full Code      Disposition Plan       SNF stable.     The patient's care was discussed with the Patient.    Erna Shelton DO  Hospitalist Service  [facility]  Securely message with the Vocera Web Console (learn more  here)  Text page via [facility] Paging/Directory         Clinically Significant Risk Factors Present on Admission                      ______________________________________________________________________    Interval History   Working toward extubation today.  Doing well on breathing trial.  Continues to have some bloody drainage from Escobedo.  Rectal tube in place.  Central line in place.  More alert on my exam this morning able to open eyes and follow some commands.    Data reviewed today: I reviewed all medications, new labs and imaging results over the last 24 hours. I personally reviewed no images or EKG's today.    Physical Exam   Vital Signs: Temp: 96.9  F (36.1  C) Temp src: Tympanic BP: 118/66 Pulse: 69   Resp: 16 SpO2: 95 % O2 Device: Mechanical Ventilator    Weight: 221 lbs 9 oz    General Appearance:   Off sedation, opens eyes. Sweaty skin.  Calm on my exam. Drainage from L eye but no redness.  Respiratory: Coarse anteriorly.  Cardiovascular: Irregular.  Rate controlled.  GI: Abdomen soft, nontender, nondistended  Skin: Warm and dry.  Skin breakdown as noted in yesterday's documentation of the left buttocks and the right heel    Data   Recent Labs   Lab 06/24/22  0510 06/23/22  1732 06/23/22  1333 06/23/22  0651 06/22/22  0812 06/22/22  0542 06/19/22  1143 06/19/22  1139   WBC 8.3  --   --  8.3  --  14.1*   < >  --    HGB 11.6*  --   --  11.5*  --  14.5   < >  --    *  --   --  104*  --  104*   < >  --      --   --  177  --  213   < >  --    INR  --   --   --   --   --   --   --  1.14     --   --  143  --  140   < >  --    POTASSIUM 3.6 4.9  --  3.2*  --  3.5   < >  --    CHLORIDE 106  --   --  107  --  108*   < >  --    CO2 25  --   --  28  --  21   < >  --    BUN 27*  --   --  31*  --  34*   < >  --    CR 1.21  --   --  1.41*  --  1.63*   < >  --    ANIONGAP 8  --   --  8  --  11   < >  --    PAWAN 8.1*  --   --  7.8*  --  8.6   < >  --    *  --  127* 135*   < > 118*   < >  --     ALBUMIN 2.6*  --   --   --   --  3.0*   < >  --    PROTTOTAL 5.1*  --   --   --   --  6.0*   < >  --    BILITOTAL 0.6  --   --   --   --  1.2*   < >  --    ALKPHOS 27*  --   --   --   --  40   < >  --    ALT 12  --   --   --   --  16   < >  --    AST 22  --   --   --   --  56*   < >  --     < > = values in this interval not displayed.     No results found for this or any previous visit (from the past 24 hour(s)).

## 2022-06-24 NOTE — PROGRESS NOTES
After reading ABG results contacted Tele-ICU. Assessed Pt per Tele-ICU request. Pt presenting normal amount of secretions, able to protect airway, and is awake and following commands. Extubation order per MD acknowledged.    Pt extubated at 1050. Order competed. Pt on 2 LPM Nasal Cannula    Marisela Mendes, RT

## 2022-06-24 NOTE — PLAN OF CARE
"BP (!) 168/101   Pulse 102   Temp 97.6  F (36.4  C) (Tympanic)   Resp 20   Ht 1.778 m (5' 10\")   Wt 100.5 kg (221 lb 9 oz)   SpO2 94%   BMI 31.79 kg/m      Elevated bp of 198/109, prn hydralazine administered, minimal results in bp.  Pt unable to follow direction when trying to drink from straw.  Carolina Menjivar RN.............................6/24/2022 1:40 PM    "

## 2022-06-24 NOTE — PROGRESS NOTES
06/24/22 1414   Quick Adds   Type of Visit Initial Occupational Therapy Evaluation   Living Environment   Current Living Arrangements   (pt unable to provide history at this time, noted indicate pt was admitted from nursing home)   Self-Care   Usual Activity Tolerance good   Current Activity Tolerance poor   Cognitive Status Examination   Orientation Status   (pt is not alert enough to fully particiapte in eval, speech is mumbled, followed commands minimally)   Affect/Mental Status (Cognitive) low arousal/lethargic   Follows Commands 0-24% accuracy   Memory Deficit   (difficult to assess at this time)   Cognitive Screens/Assessments   Cognitive Assessments Completed   (TBA as needed when appropriate)   Visual Perception   Visual Impairment/Limitations unable/difficult to assess   Range of Motion Comprehensive   General Range of Motion   (attempted AROM, pt able to flex bilateral elbows on commands x 1, able to open eyes on command x 1, responded with unintelligable speech at times, no other purposeful movement or following of commands at this time)   Bed Mobility   Bed Mobility   (not assessed)   Transfers   Transfers   (TBA as able)   Clinical Impression   Criteria for Skilled Therapeutic Interventions Met (OT) Yes, treatment indicated   OT Diagnosis ETOH W/D   Influenced by the following impairments mobility/self care/cognition   OT Problem List-Impairments impacting ADL activity tolerance impaired;cognition;communication;strength   Assessment of Occupational Performance 1-3 Performance Deficits   Identified Performance Deficits mobility/self care   Planned Therapy Interventions (OT) ADL retraining;cognition;ROM;strengthening;progressive activity/exercise   Clinical Decision Making Complexity (OT) moderate complexity   Risk & Benefits of therapy have been explained risks/benefits reviewed   OT Discharge Planning   OT Discharge Recommendation (DC Rec) Transitional Care Facility   OT Rationale for DC Rec Pt will likely  need ongoing strengthening and therapies after D/C but will continue to assess   OT Brief overview of current status Therapy was initialted this date per MD request as pt was extubated earlier today and sedation decreased. Pt was lethargic and difficult to arouse, attempted to assess strength and ability to follow commands, pt was able to open eyes on command x 1 and flex bilateral elbows passively x 1 but did not squeeze hands on command. Pt's U/E's were postioned in elevation due to edema, will continue to follow and progress with therapies as able.   Total Evaluation Time (Minutes)   Total Evaluation Time (Minutes) 30   OT Goals   Therapy Frequency (OT) Daily   OT Predicted Duration/Target Date for Goal Attainment   (uncertain at this time)   OT Goals Cognition;OT Goal 1   OT: Cognitive   (Pt will follow simple one step commands with 90% accuracy)   OT: Goal 1 Pt will particiapte in A/AROM to bilateral U/E's

## 2022-06-25 ENCOUNTER — APPOINTMENT (OUTPATIENT)
Dept: PHYSICAL THERAPY | Facility: OTHER | Age: 72
DRG: 673 | End: 2022-06-25
Payer: MEDICARE

## 2022-06-25 ENCOUNTER — APPOINTMENT (OUTPATIENT)
Dept: OCCUPATIONAL THERAPY | Facility: OTHER | Age: 72
DRG: 673 | End: 2022-06-25
Payer: MEDICARE

## 2022-06-25 LAB
HOLD SPECIMEN: NORMAL
MAGNESIUM SERPL-MCNC: 2 MG/DL (ref 1.9–2.7)
PHOSPHATE SERPL-MCNC: 2.2 MG/DL (ref 2.5–5)
POTASSIUM BLD-SCNC: 3 MMOL/L (ref 3.5–5.1)
POTASSIUM BLD-SCNC: 3.4 MMOL/L (ref 3.5–5.1)
SARS-COV-2 RNA RESP QL NAA+PROBE: NEGATIVE

## 2022-06-25 PROCEDURE — 250N000011 HC RX IP 250 OP 636: Performed by: INTERNAL MEDICINE

## 2022-06-25 PROCEDURE — 84132 ASSAY OF SERUM POTASSIUM: CPT | Performed by: FAMILY MEDICINE

## 2022-06-25 PROCEDURE — 97535 SELF CARE MNGMENT TRAINING: CPT | Mod: GO

## 2022-06-25 PROCEDURE — 250N000011 HC RX IP 250 OP 636: Performed by: FAMILY MEDICINE

## 2022-06-25 PROCEDURE — 36415 COLL VENOUS BLD VENIPUNCTURE: CPT | Performed by: FAMILY MEDICINE

## 2022-06-25 PROCEDURE — 97110 THERAPEUTIC EXERCISES: CPT | Mod: GP

## 2022-06-25 PROCEDURE — 250N000013 HC RX MED GY IP 250 OP 250 PS 637: Performed by: FAMILY MEDICINE

## 2022-06-25 PROCEDURE — U0003 INFECTIOUS AGENT DETECTION BY NUCLEIC ACID (DNA OR RNA); SEVERE ACUTE RESPIRATORY SYNDROME CORONAVIRUS 2 (SARS-COV-2) (CORONAVIRUS DISEASE [COVID-19]), AMPLIFIED PROBE TECHNIQUE, MAKING USE OF HIGH THROUGHPUT TECHNOLOGIES AS DESCRIBED BY CMS-2020-01-R: HCPCS | Performed by: FAMILY MEDICINE

## 2022-06-25 PROCEDURE — 97530 THERAPEUTIC ACTIVITIES: CPT | Mod: GO

## 2022-06-25 PROCEDURE — 200N000001 HC R&B ICU

## 2022-06-25 PROCEDURE — 97530 THERAPEUTIC ACTIVITIES: CPT | Mod: GP

## 2022-06-25 PROCEDURE — 250N000009 HC RX 250: Performed by: FAMILY MEDICINE

## 2022-06-25 PROCEDURE — 84100 ASSAY OF PHOSPHORUS: CPT | Performed by: FAMILY MEDICINE

## 2022-06-25 PROCEDURE — 87899 AGENT NOS ASSAY W/OPTIC: CPT | Performed by: FAMILY MEDICINE

## 2022-06-25 PROCEDURE — 83735 ASSAY OF MAGNESIUM: CPT | Performed by: FAMILY MEDICINE

## 2022-06-25 PROCEDURE — C9113 INJ PANTOPRAZOLE SODIUM, VIA: HCPCS | Performed by: INTERNAL MEDICINE

## 2022-06-25 PROCEDURE — 99291 CRITICAL CARE FIRST HOUR: CPT | Performed by: FAMILY MEDICINE

## 2022-06-25 PROCEDURE — 258N000003 HC RX IP 258 OP 636: Performed by: FAMILY MEDICINE

## 2022-06-25 RX ORDER — POTASSIUM CHLORIDE 7.45 MG/ML
10 INJECTION INTRAVENOUS
Status: COMPLETED | OUTPATIENT
Start: 2022-06-25 | End: 2022-06-25

## 2022-06-25 RX ORDER — METHYLPREDNISOLONE SODIUM SUCCINATE 40 MG/ML
20 INJECTION, POWDER, LYOPHILIZED, FOR SOLUTION INTRAMUSCULAR; INTRAVENOUS EVERY 24 HOURS
Status: DISCONTINUED | OUTPATIENT
Start: 2022-06-25 | End: 2022-06-27

## 2022-06-25 RX ORDER — HYDRALAZINE HYDROCHLORIDE 20 MG/ML
20 INJECTION INTRAMUSCULAR; INTRAVENOUS EVERY 6 HOURS PRN
Status: DISCONTINUED | OUTPATIENT
Start: 2022-06-25 | End: 2022-07-12 | Stop reason: HOSPADM

## 2022-06-25 RX ADMIN — TAZOBACTAM SODIUM AND PIPERACILLIN SODIUM 4.5 G: 500; 4 INJECTION, SOLUTION INTRAVENOUS at 12:55

## 2022-06-25 RX ADMIN — POTASSIUM CHLORIDE 10 MEQ: 7.46 INJECTION, SOLUTION INTRAVENOUS at 07:54

## 2022-06-25 RX ADMIN — CLONIDINE HYDROCHLORIDE 0.1 MG: 0.1 TABLET ORAL at 16:24

## 2022-06-25 RX ADMIN — HYDRALAZINE HYDROCHLORIDE 20 MG: 20 INJECTION INTRAMUSCULAR; INTRAVENOUS at 21:33

## 2022-06-25 RX ADMIN — POTASSIUM CHLORIDE 10 MEQ: 7.46 INJECTION, SOLUTION INTRAVENOUS at 14:46

## 2022-06-25 RX ADMIN — TAZOBACTAM SODIUM AND PIPERACILLIN SODIUM 4.5 G: 500; 4 INJECTION, SOLUTION INTRAVENOUS at 17:37

## 2022-06-25 RX ADMIN — POTASSIUM CHLORIDE 10 MEQ: 7.46 INJECTION, SOLUTION INTRAVENOUS at 20:16

## 2022-06-25 RX ADMIN — CLONIDINE HYDROCHLORIDE 0.1 MG: 0.1 TABLET ORAL at 23:19

## 2022-06-25 RX ADMIN — HYDRALAZINE HYDROCHLORIDE 10 MG: 20 INJECTION INTRAMUSCULAR; INTRAVENOUS at 16:46

## 2022-06-25 RX ADMIN — POTASSIUM CHLORIDE 10 MEQ: 7.46 INJECTION, SOLUTION INTRAVENOUS at 11:48

## 2022-06-25 RX ADMIN — ENOXAPARIN SODIUM 150 MG: 150 INJECTION SUBCUTANEOUS at 10:17

## 2022-06-25 RX ADMIN — POTASSIUM CHLORIDE 10 MEQ: 7.46 INJECTION, SOLUTION INTRAVENOUS at 19:13

## 2022-06-25 RX ADMIN — NICOTINE 1 PATCH: 14 PATCH, EXTENDED RELEASE TRANSDERMAL at 07:57

## 2022-06-25 RX ADMIN — FOLIC ACID: 5 INJECTION, SOLUTION INTRAMUSCULAR; INTRAVENOUS; SUBCUTANEOUS at 10:49

## 2022-06-25 RX ADMIN — SODIUM PHOSPHATE, MONOBASIC, MONOHYDRATE 15 MMOL: 276; 142 INJECTION, SOLUTION INTRAVENOUS at 07:44

## 2022-06-25 RX ADMIN — POTASSIUM CHLORIDE 10 MEQ: 7.46 INJECTION, SOLUTION INTRAVENOUS at 18:09

## 2022-06-25 RX ADMIN — POTASSIUM CHLORIDE 10 MEQ: 7.46 INJECTION, SOLUTION INTRAVENOUS at 08:57

## 2022-06-25 RX ADMIN — METHYLPREDNISOLONE SODIUM SUCCINATE 20 MG: 40 INJECTION, POWDER, FOR SOLUTION INTRAMUSCULAR; INTRAVENOUS at 10:23

## 2022-06-25 RX ADMIN — SODIUM CHLORIDE: 900 INJECTION, SOLUTION INTRAVENOUS at 07:48

## 2022-06-25 RX ADMIN — TAZOBACTAM SODIUM AND PIPERACILLIN SODIUM 4.5 G: 500; 4 INJECTION, SOLUTION INTRAVENOUS at 06:55

## 2022-06-25 RX ADMIN — LISINOPRIL 20 MG: 20 TABLET ORAL at 10:17

## 2022-06-25 RX ADMIN — POTASSIUM CHLORIDE 10 MEQ: 7.46 INJECTION, SOLUTION INTRAVENOUS at 12:58

## 2022-06-25 RX ADMIN — HYDROMORPHONE HYDROCHLORIDE 0.5 MG: 1 INJECTION, SOLUTION INTRAMUSCULAR; INTRAVENOUS; SUBCUTANEOUS at 04:31

## 2022-06-25 RX ADMIN — POTASSIUM CHLORIDE 10 MEQ: 7.46 INJECTION, SOLUTION INTRAVENOUS at 21:16

## 2022-06-25 RX ADMIN — PANTOPRAZOLE SODIUM 40 MG: 40 INJECTION, POWDER, FOR SOLUTION INTRAVENOUS at 07:47

## 2022-06-25 RX ADMIN — DIAZEPAM 5 MG: 10 INJECTION, SOLUTION INTRAMUSCULAR; INTRAVENOUS at 21:32

## 2022-06-25 RX ADMIN — THIAMINE HCL TAB 100 MG 300 MG: 100 TAB at 10:23

## 2022-06-25 RX ADMIN — POTASSIUM CHLORIDE 10 MEQ: 7.46 INJECTION, SOLUTION INTRAVENOUS at 10:23

## 2022-06-25 ASSESSMENT — ACTIVITIES OF DAILY LIVING (ADL)
ADLS_ACUITY_SCORE: 54
ADLS_ACUITY_SCORE: 56
ADLS_ACUITY_SCORE: 54

## 2022-06-25 NOTE — PROGRESS NOTES
06/25/22 1251   Signing Clinician's Name / Credentials   Signing clinician's name / credentials Mayte Burris OTR/L   Quick Adds   Rehab Discipline OT   Therapeutic Activity   Minutes of Treatment 25 minutes   Symptoms Noted During/After Treatment Fatigue   Treatment Detail Supine to sit at EOB with max assist x3. Patient able to sit at EOB for 5 minutes with mod-max assist x2. Max assist x3 for sit to supine and max assist to roll in bed and re-position to reduce pressure sores. UE mobility with mod assist, patient able to follow therapists diresctions to sqeeze fingers.   ADL Training   Minutes of Treatment 15   Symptoms Noted During/After Treatment fatigue   Treatment ADL training within precautions   Treatment Detail Washed face with mod assist patient able to follow cues. Total assist to wash hair with shampoo cap and comb hair.   Patient Response Able to follow techniques as instructed   OT Discharge Planning   OT Discharge Recommendation (DC Rec) Transitional Care Facility   OT Rationale for DC Rec Patient requiring mod-max assist with bed mobility, sitting EOB and with ADLs.   Additional Documentation   OT Plan Continue with OT while hospitalized.   Total Session Time   Total Session Time (minutes) 40 minutes

## 2022-06-25 NOTE — PLAN OF CARE
Problem: Oral Intake Inadequate  Goal: Optimal Oral Intake  Outcome: Ongoing, Progressing   Goal Outcome Evaluation:    Patient was fed pudding and ensure, he was able to swallow without issues, unable to use straw, took pills, refused any other food at this time, it was offered.
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
## 2022-06-25 NOTE — PROGRESS NOTES
Patient has been sleeping most of the night. Patient has been moaning and was given an additional dose of dilaudid and has been sleeping since. Patient has had good output from archer. No output out of rectal tube. Patient CIWAs have been 7-9. Patient speech is garbled and is difficult to understand at times. Isaac Smith RN on 6/25/2022 at 5:06 AM
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
## 2022-06-25 NOTE — PROGRESS NOTES
Patient hving elevated BP and given hydralazine. Patient has been calling out asking to go home. Have attempted to reorient to the fact that he will be in the hospital for next few days, but continues to cry out to go home. Has not attempted to get out of bed. No signs of agitation at this time. Isaac Smith RN on 6/24/2022 at 9:00 PM

## 2022-06-25 NOTE — PROGRESS NOTES
06/25/22 1200   Signing Clinician's Name / Credentials   Signing clinician's name / credentials Alysia Knight DPT   Quick Adds   Rehab Discipline PT   Quick Adds Interventions Therapeutic Activity   Therapeutic Activity   Minutes of Treatment 30 minutes   Symptoms Noted During/After Treatment Fatigue   Treatment Detail with assist from nursing able to reposition and then sit to EOB x10:00 with ADLs, he required max assist x3-4 for transfer from supine to sit and sit to supine, sitting EOB required mod assist x2 for sitting posture during ADLs   Therapeutic Exercise   Minutes of Treatment 15   Symptoms Noted During/After Treatment fatigue   Treatment Detail AAROM with hip and knee flex then worked on sitting EOB.   PT Discharge Planning   PT Discharge Recommendation (DC Rec) Transitional Care Facility   PT Rationale for DC Rec Still significant functional limitations.   PT Brief overview of current status Pt was talking a little but not fully resposive, nursing worked with therapy to sit him on EOB 5-10 minutes with ADLs and needed max A x3-4 for transfers and mod A x2 for sitting EOB.   Additional Documentation   PT Plan Continue PT   Total Session Time   Total Session Time (minutes) 45 minutes

## 2022-06-25 NOTE — PROGRESS NOTES
Patient was moaning in pain despite denying pain. Was given dilaudid and has been able to sleep. Patient has had good urine output which is slightly pink tinged. Isaac Smith RN on 6/25/2022 at 12:33 AM
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
## 2022-06-25 NOTE — PROGRESS NOTES
Shift Summary  Pt was extubated earlier in the shift after sedation was stopped and Pt underwent SBT for on hour and 30 minutes.. Pt is currently on 2 LPM. Oral suctioning done periodically. No further interventions required at this time.    Marisela Mendes, RT

## 2022-06-25 NOTE — PLAN OF CARE
Problem: Alcohol Withdrawal  Goal: Alcohol Withdrawal Symptom Control  Outcome: Ongoing, Progressing  Intervention: Minimize or Manage Alcohol Withdrawal Symptoms  Recent Flowsheet Documentation  Taken 6/25/2022 0011 by Isaac Smith RN  Aspiration Precautions: awake/alert before oral intake  Seizure Precautions:   activity supervised   clutter-free environment maintained  Taken 6/24/2022 1933 by Isaac Smith RN  Aspiration Precautions: awake/alert before oral intake  Seizure Precautions:   activity supervised   clutter-free environment maintained     Problem: Behavioral Health Comorbidity  Goal: Maintenance of Behavioral Health Symptom Control  Outcome: Ongoing, Not Progressing  Intervention: Maintain Behavioral Health Symptom Control  Recent Flowsheet Documentation  Taken 6/25/2022 0011 by Isaac Smith RN  Medication Review/Management: medications reviewed  Taken 6/24/2022 1933 by Isaac Smith RN  Medication Review/Management: medications reviewed     Problem: Fall Injury Risk  Goal: Absence of Fall and Fall-Related Injury  Outcome: Ongoing, Not Progressing  Intervention: Identify and Manage Contributors  Recent Flowsheet Documentation  Taken 6/25/2022 0011 by Isaac Smith RN  Medication Review/Management: medications reviewed  Taken 6/24/2022 1933 by Isaac Smith RN  Medication Review/Management: medications reviewed  Intervention: Promote Injury-Free Environment  Recent Flowsheet Documentation  Taken 6/25/2022 0011 by Isaac Smith RN  Safety Promotion/Fall Prevention:   activity supervised   assistive device/personal items within reach   clutter free environment maintained   fall prevention program maintained   increased rounding and observation   increase visualization of patient   lighting adjusted   nonskid shoes/slippers when out of bed   patient and family education   room door open   room near nurse's station   room organization consistent   safety  round/check completed   supervised activity  Taken 6/24/2022 1933 by Isaac Smith, RN  Safety Promotion/Fall Prevention:   activity supervised   assistive device/personal items within reach   clutter free environment maintained   fall prevention program maintained   increased rounding and observation   increase visualization of patient   lighting adjusted   nonskid shoes/slippers when out of bed   patient and family education   room door open   room near nurse's station   room organization consistent   safety round/check completed   supervised activity   Goal Outcome Evaluation:

## 2022-06-26 ENCOUNTER — APPOINTMENT (OUTPATIENT)
Dept: PHYSICAL THERAPY | Facility: OTHER | Age: 72
DRG: 673 | End: 2022-06-26
Payer: MEDICARE

## 2022-06-26 ENCOUNTER — APPOINTMENT (OUTPATIENT)
Dept: OCCUPATIONAL THERAPY | Facility: OTHER | Age: 72
DRG: 673 | End: 2022-06-26
Payer: MEDICARE

## 2022-06-26 LAB
ALBUMIN SERPL-MCNC: 3.1 G/DL (ref 3.5–5.7)
ALP SERPL-CCNC: 40 U/L (ref 34–104)
ALT SERPL W P-5'-P-CCNC: 34 U/L (ref 7–52)
ANION GAP SERPL CALCULATED.3IONS-SCNC: 9 MMOL/L (ref 3–14)
AST SERPL W P-5'-P-CCNC: 44 U/L (ref 13–39)
BILIRUB SERPL-MCNC: 1.2 MG/DL (ref 0.3–1)
BUN SERPL-MCNC: 12 MG/DL (ref 7–25)
CALCIUM SERPL-MCNC: 8.6 MG/DL (ref 8.6–10.3)
CHLORIDE BLD-SCNC: 101 MMOL/L (ref 98–107)
CO2 SERPL-SCNC: 30 MMOL/L (ref 21–31)
CREAT SERPL-MCNC: 1.04 MG/DL (ref 0.7–1.3)
GFR SERPL CREATININE-BSD FRML MDRD: 75 ML/MIN/1.73M2
GLUCOSE BLD-MCNC: 92 MG/DL (ref 70–105)
HOLD SPECIMEN: NORMAL
MAGNESIUM SERPL-MCNC: 1.8 MG/DL (ref 1.9–2.7)
PHOSPHATE SERPL-MCNC: 2.7 MG/DL (ref 2.5–5)
POTASSIUM BLD-SCNC: 3.5 MMOL/L (ref 3.5–5.1)
POTASSIUM BLD-SCNC: 3.5 MMOL/L (ref 3.5–5.1)
PROT SERPL-MCNC: 6.3 G/DL (ref 6.4–8.9)
S PNEUM AG SPEC QL: NEGATIVE
SODIUM SERPL-SCNC: 140 MMOL/L (ref 134–144)

## 2022-06-26 PROCEDURE — 250N000011 HC RX IP 250 OP 636: Performed by: INTERNAL MEDICINE

## 2022-06-26 PROCEDURE — 97535 SELF CARE MNGMENT TRAINING: CPT | Mod: GO

## 2022-06-26 PROCEDURE — 97110 THERAPEUTIC EXERCISES: CPT | Mod: GP

## 2022-06-26 PROCEDURE — 84100 ASSAY OF PHOSPHORUS: CPT | Performed by: FAMILY MEDICINE

## 2022-06-26 PROCEDURE — 250N000011 HC RX IP 250 OP 636: Performed by: FAMILY MEDICINE

## 2022-06-26 PROCEDURE — 250N000009 HC RX 250: Performed by: FAMILY MEDICINE

## 2022-06-26 PROCEDURE — 258N000003 HC RX IP 258 OP 636: Performed by: FAMILY MEDICINE

## 2022-06-26 PROCEDURE — 84132 ASSAY OF SERUM POTASSIUM: CPT | Performed by: FAMILY MEDICINE

## 2022-06-26 PROCEDURE — C9113 INJ PANTOPRAZOLE SODIUM, VIA: HCPCS | Performed by: INTERNAL MEDICINE

## 2022-06-26 PROCEDURE — 250N000013 HC RX MED GY IP 250 OP 250 PS 637: Performed by: FAMILY MEDICINE

## 2022-06-26 PROCEDURE — 200N000001 HC R&B ICU

## 2022-06-26 PROCEDURE — 36415 COLL VENOUS BLD VENIPUNCTURE: CPT | Performed by: FAMILY MEDICINE

## 2022-06-26 PROCEDURE — 97530 THERAPEUTIC ACTIVITIES: CPT | Mod: GO

## 2022-06-26 PROCEDURE — 83735 ASSAY OF MAGNESIUM: CPT | Performed by: FAMILY MEDICINE

## 2022-06-26 PROCEDURE — 97530 THERAPEUTIC ACTIVITIES: CPT | Mod: GP

## 2022-06-26 PROCEDURE — 99291 CRITICAL CARE FIRST HOUR: CPT | Performed by: FAMILY MEDICINE

## 2022-06-26 RX ORDER — LABETALOL HYDROCHLORIDE 5 MG/ML
10 INJECTION, SOLUTION INTRAVENOUS EVERY 6 HOURS
Status: DISCONTINUED | OUTPATIENT
Start: 2022-06-26 | End: 2022-06-26

## 2022-06-26 RX ORDER — MAGNESIUM SULFATE HEPTAHYDRATE 40 MG/ML
2 INJECTION, SOLUTION INTRAVENOUS ONCE
Status: COMPLETED | OUTPATIENT
Start: 2022-06-26 | End: 2022-06-26

## 2022-06-26 RX ORDER — METOPROLOL SUCCINATE 25 MG/1
25 TABLET, EXTENDED RELEASE ORAL DAILY
Status: DISCONTINUED | OUTPATIENT
Start: 2022-06-26 | End: 2022-06-27

## 2022-06-26 RX ORDER — LABETALOL HYDROCHLORIDE 5 MG/ML
10 INJECTION, SOLUTION INTRAVENOUS EVERY 6 HOURS PRN
Status: DISCONTINUED | OUTPATIENT
Start: 2022-06-26 | End: 2022-07-03

## 2022-06-26 RX ADMIN — PANTOPRAZOLE SODIUM 40 MG: 40 INJECTION, POWDER, FOR SOLUTION INTRAVENOUS at 07:23

## 2022-06-26 RX ADMIN — MAGNESIUM SULFATE HEPTAHYDRATE 2 G: 40 INJECTION, SOLUTION INTRAVENOUS at 06:41

## 2022-06-26 RX ADMIN — TAZOBACTAM SODIUM AND PIPERACILLIN SODIUM 4.5 G: 500; 4 INJECTION, SOLUTION INTRAVENOUS at 17:21

## 2022-06-26 RX ADMIN — TAZOBACTAM SODIUM AND PIPERACILLIN SODIUM 4.5 G: 500; 4 INJECTION, SOLUTION INTRAVENOUS at 05:07

## 2022-06-26 RX ADMIN — TAZOBACTAM SODIUM AND PIPERACILLIN SODIUM 4.5 G: 500; 4 INJECTION, SOLUTION INTRAVENOUS at 11:49

## 2022-06-26 RX ADMIN — HYDRALAZINE HYDROCHLORIDE 20 MG: 20 INJECTION INTRAMUSCULAR; INTRAVENOUS at 17:14

## 2022-06-26 RX ADMIN — LISINOPRIL 20 MG: 20 TABLET ORAL at 09:34

## 2022-06-26 RX ADMIN — THIAMINE HCL TAB 100 MG 300 MG: 100 TAB at 09:37

## 2022-06-26 RX ADMIN — TAZOBACTAM SODIUM AND PIPERACILLIN SODIUM 4.5 G: 500; 4 INJECTION, SOLUTION INTRAVENOUS at 00:00

## 2022-06-26 RX ADMIN — HYDROMORPHONE HYDROCHLORIDE 0.3 MG: 1 INJECTION, SOLUTION INTRAMUSCULAR; INTRAVENOUS; SUBCUTANEOUS at 22:32

## 2022-06-26 RX ADMIN — HYDRALAZINE HYDROCHLORIDE 20 MG: 20 INJECTION INTRAMUSCULAR; INTRAVENOUS at 05:07

## 2022-06-26 RX ADMIN — LABETALOL HYDROCHLORIDE 10 MG: 5 INJECTION, SOLUTION INTRAVENOUS at 00:54

## 2022-06-26 RX ADMIN — DIAZEPAM 5 MG: 10 INJECTION, SOLUTION INTRAMUSCULAR; INTRAVENOUS at 01:55

## 2022-06-26 RX ADMIN — ENOXAPARIN SODIUM 150 MG: 150 INJECTION SUBCUTANEOUS at 09:38

## 2022-06-26 RX ADMIN — METOPROLOL SUCCINATE 25 MG: 25 TABLET, EXTENDED RELEASE ORAL at 09:32

## 2022-06-26 RX ADMIN — METHYLPREDNISOLONE SODIUM SUCCINATE 20 MG: 40 INJECTION, POWDER, FOR SOLUTION INTRAMUSCULAR; INTRAVENOUS at 09:38

## 2022-06-26 RX ADMIN — NICOTINE 1 PATCH: 14 PATCH, EXTENDED RELEASE TRANSDERMAL at 07:24

## 2022-06-26 RX ADMIN — DIAZEPAM 5 MG: 10 INJECTION, SOLUTION INTRAMUSCULAR; INTRAVENOUS at 00:54

## 2022-06-26 RX ADMIN — LABETALOL HYDROCHLORIDE 10 MG: 5 INJECTION, SOLUTION INTRAVENOUS at 14:14

## 2022-06-26 RX ADMIN — FOLIC ACID: 5 INJECTION, SOLUTION INTRAMUSCULAR; INTRAVENOUS; SUBCUTANEOUS at 09:47

## 2022-06-26 RX ADMIN — LABETALOL HYDROCHLORIDE 10 MG: 5 INJECTION, SOLUTION INTRAVENOUS at 22:11

## 2022-06-26 ASSESSMENT — ACTIVITIES OF DAILY LIVING (ADL)
ADLS_ACUITY_SCORE: 56

## 2022-06-26 NOTE — PROGRESS NOTES
Patient currently sleeping. Blood pressures remain high but below order parameters. Isaac Smith RN on 6/26/2022 at 4:15 AM

## 2022-06-26 NOTE — PLAN OF CARE
Problem: Fall Injury Risk  Goal: Absence of Fall and Fall-Related Injury  Outcome: Ongoing, Not Progressing  Intervention: Identify and Manage Contributors  Recent Flowsheet Documentation  Taken 6/25/2022 1914 by Isaac Smith RN  Medication Review/Management: medications reviewed  Intervention: Promote Injury-Free Environment  Recent Flowsheet Documentation  Taken 6/25/2022 2356 by Isaac Smith RN  Safety Promotion/Fall Prevention:   clutter free environment maintained   fall prevention program maintained   increased rounding and observation   increase visualization of patient   lighting adjusted   patient and family education   room door open   room near nurse's station   room organization consistent   safety round/check completed  Taken 6/25/2022 1914 by Luis, Isaac, RN  Safety Promotion/Fall Prevention:   clutter free environment maintained   fall prevention program maintained   increased rounding and observation   increase visualization of patient   lighting adjusted   patient and family education   room door open   room near nurse's station   room organization consistent   safety round/check completed     Problem: Behavioral Health Comorbidity  Goal: Maintenance of Behavioral Health Symptom Control  Outcome: Ongoing, Not Progressing  Intervention: Maintain Behavioral Health Symptom Control  Recent Flowsheet Documentation  Taken 6/25/2022 1914 by Isaac Smith RN  Medication Review/Management: medications reviewed     Problem: Alcohol Withdrawal  Goal: Alcohol Withdrawal Symptom Control  Outcome: Ongoing, Not Progressing  Intervention: Minimize or Manage Alcohol Withdrawal Symptoms  Recent Flowsheet Documentation  Taken 6/25/2022 2356 by Isaac Smith RN  Aspiration Precautions:   awake/alert before oral intake   food consistency adjusted   food texture adjusted  Seizure Precautions:   activity supervised   clutter-free environment maintained   side rails padded  Taken  6/25/2022 1914 by Isaac Smith, RN  Aspiration Precautions:   awake/alert before oral intake   food consistency adjusted   food texture adjusted  Seizure Precautions:   activity supervised   clutter-free environment maintained   side rails padded     Problem: Oral Intake Inadequate  Goal: Optimal Oral Intake  Outcome: Ongoing, Not Progressing   Goal Outcome Evaluation:

## 2022-06-26 NOTE — PROGRESS NOTES
Patient has been having issues with high blood pressures throughout the night. Patient was given additional dose of hydralazine and blood pressures improved. Additional dose of valium given to help with agitation. Patient had many loose BM overnight and coccyx looks worse in the AM despite frequent repositioning, barrier cream, and foam dressing. Patient currently sleeping. Isaac Smith RN on 6/26/2022 at 6:26 AM

## 2022-06-26 NOTE — PROGRESS NOTES
Talked with Tele ICU about elevated blood pressures and was given order for labetalol and instruction to give dose of valium. Both given, see PRINCESS Smith RN on 6/26/2022 at 12:58 AM

## 2022-06-26 NOTE — PROGRESS NOTES
Patient has become more agitated and keeps yelling at staff to get out of his house. When informed that his BP was elevated he stated that he did not want anything to fix BP. Patient is disoriented to place, time and situation. Tele ICU was contacted about elevated BP and hydralazine order was modified. Isaac Smith RN on 6/25/2022 at 9:24 PM

## 2022-06-26 NOTE — PROGRESS NOTES
Patient blood pressure continues to rise. Patient was more agreeable at this time and was willing to take oral clonidine. Patient had received a dose of valium due to elevated restlessness and CIWA. Afterwards patient has continued to stay restless but with decreased agitation. He has also been calling out for his wife for the last three hours. Patient has been trying to get out of bed but has been too weak to elevate his limbs over the edge of the bed. Isaac Smith RN on 6/25/2022 at 11:26 PM

## 2022-06-26 NOTE — PROGRESS NOTES
[facility]    Medicine Progress Note - Hospitalist Service    Date of Admission:  6/17/2022    Assessment & Plan    Antonio Rutherford is a 72 year old male admitted on 6/17/2022. He presented from the correction for concerns of possible kidney stone versus sepsis.     Patient was admitted to the correction 7 days PTA.  He is a daily drinker of whiskey and was treated for alcohol withdrawal at the correction and then started on Bactrim for possible UTI 4 days PTA.  He had extremely poor oral intake PTA (20 oz in 7d per correction nurse report).    Patient was admitted treated with thiamine, folate and IV fluids, strict I's and O's and close monitoring.  Developed worsening confusion and somnolence and concern to protect his airway.  Got intubated on 6/21/2022, extubated 6/24/2022.       Alcohol withdrawal (H) with alcohol dependence.  Likely out of the window for withdrawal at this time.  Assessment: Sedated and intubated in the ICU on 6/21/22 for somnolence, extubated on 6/24/2022. Ammonia level was normal, ABG did not show significant CO2 retention, head CT and brain MRI neg for acute findings. Repeat CT of the head negative.  Still with some encephalopathy versus delirium.  Appears to be more delirium at this time.  - Appreciate telemetry ICU recommendations and care.  -Will likely need placement in acute rehab unit prior to discharge home.  We will need to discuss with social work on Monday.    Acute PE, POA  -lovenox 1.5mg/kg/d  -We will need anticoagulation when able to take oral medication, improving p.o. intake today.  We will switch him over to oral anticoagulation when taking better p.o.    CHARLOTTE due to ATN (acute tubular necrosis) (H), resolved today.  Improved today.    Assessment: Bactrim and ibuprofen potentially causing ATN.  Also significant prerenal component as evidenced by improvement with hydration.  Continues to have some mild hematuria.    Plan: monitor labs. Avoid NSAIDs and nephrotoxins as able.  Renally dose zosyn.     Community-acquired versus aspiration pneumonia.  Favor aspiration given the setting.  He has been on Zosyn, day 6 of treatment.  Continue for 1 more day of treatment.    ? Of UTI. POA. Was given bactrim PTA. Urine culture negative. On zosyn for pneumonia which will cover. No growth in urine culture. Blood cultures negative.     Hypotension likely due to propofol sedation.  Now has been hypertensive.  Home medications have been restarted.  Has been getting labetalol and hydralazine as needed.  Off pressors and sedation at this time.  Hypotension resolved.  Has been hypertensive.  Home medications have been added back.  -Continue home lisinopril  -Added metoprolol today  -Continue as needed hydralazine and-labetalol      Dehydration    Assessment: Resolved.      Plan: Taking oral at this time.  However not taking much.  Continue to monitor labs closely.  May need to consider IV fluids or TPN       CHARLOTTE (acute kidney injury) (H), resolved.  Present on admission.    Assessment: Likely multifactorial with prerenal azotemia and possible ATN.  Now resolved.    Plan: BMP in AM.  Continue to hold NSAIDs.  Renally dose medications.      Sinus tachycardia, improved. Got amiodarone x24 hours. Now HR regular and controlled.    Assessment:   Now stable.   Plan: continue tele/ICU today, continue current medications     Encephalopathy, metabolic. CT and MRI head/brain negative for acute findings.   Assessment:  Likely due to hospital-acquired, ICU acquired delirium at this time.  Cannot exclude encephalopathy from acute infection which should all be improving at this time.  He should be out of the timeframe for withdrawal.  Suspect more likely delirium at this time.  Plan: Continue current treatment plan.  Minimize tethers.  Delirium order set.  Will need placement.      diarrhea  -C. difficile negative.      Smoker. Cannot exclude COPD  -steroids, dose weaned down to 20 mg yesterday.    Stage 1 pressure  ulcer of buttocks  Heel pressure ulcer stage 1. Left foot.   -offload  -rectal tube place  -dressings    Total critical care time 50 minutes       Diet: Soft & Bite Sized Diet (level 6) Liquidized/Moderately Thick (level 3)    DVT Prophylaxis: Enoxaparin (Lovenox) SQ  [place] Catheter: PRESENT, indication: Strict 1-2 Hour I&O  Central Lines: PRESENT  CVC TRIPLE LUMEN Right External jugular-Site Assessment: WDL  Cardiac Monitoring: ACTIVE order. Indication: Electrolyte Imbalance (24 hours)- Magnesium <1.3 mg/ml; Potassium < =2.8 or > 5.5 mg/ml  Code Status: Full Code      Disposition Plan     Unclear. Several days. Likely will need ARU vs SNF.        The patient's care was discussed with the Patient and Patient's Family.    Erna Shelton DO  Hospitalist Service  [facility]  Securely message with the Vocera Web Console (learn more here)  Text page via [employer] Paging/Directory         Clinically Significant Risk Factors Present on Admission                      ______________________________________________________________________    Interval History   Will speak coherently at times but still confused and delirious.  Not oriented.  We will track and follow some commands.  Has been off oxygen and satting 97% on room air.  Only access currently is central line.  Rectal tube removed yesterday but he still has diarrhea today with skin breakdown of the buttock.  Blood pressures have been elevated and labile.    data reviewed today: I reviewed all medications, new labs and imaging results over the last 24 hours. I personally reviewed no images or EKG's today.    Physical Exam   Vital Signs: Temp: 97.4  F (36.3  C) Temp src: Tympanic BP: (!) 141/84 Pulse: 73   Resp: 22 SpO2: 92 % O2 Device: None (Room air)    Weight: 213 lbs 6.48 oz  General Appearance: Very frail weak and deconditioned.  Confused.  Follows some commands and some intelligible speech today.   Respiratory: Clearer today.  Poor inspiratory  effort.  Coarse but clears some with coughing.  Cardiovascular: irRegular.rate controlled  GI: Abdomen soft, nontender, nondistended  Skin: Warm and dry.  Stage I ulcer of buttocks area.  Stage I ulcer of right heel with blister.  Other:      Data   Recent Labs   Lab 06/26/22  0455 06/26/22  0006 06/25/22  1706 06/25/22  0427 06/24/22  0510 06/23/22  1732 06/23/22  1333 06/23/22  0651 06/22/22  0812 06/22/22  0542 06/19/22  1143 06/19/22  1139   WBC  --   --   --   --  8.3  --   --  8.3  --  14.1*   < >  --    HGB  --   --   --   --  11.6*  --   --  11.5*  --  14.5   < >  --    MCV  --   --   --   --  106*  --   --  104*  --  104*   < >  --    PLT  --   --   --   --  189  --   --  177  --  213   < >  --    INR  --   --   --   --   --   --   --   --   --   --   --  1.14     --   --   --  139  --   --  143  --  140   < >  --    POTASSIUM 3.5 3.5 3.4*   < > 3.6   < >  --  3.2*  --  3.5   < >  --    CHLORIDE 101  --   --   --  106  --   --  107  --  108*   < >  --    CO2 30  --   --   --  25  --   --  28  --  21   < >  --    BUN 12  --   --   --  27*  --   --  31*  --  34*   < >  --    CR 1.04  --   --   --  1.21  --   --  1.41*  --  1.63*   < >  --    ANIONGAP 9  --   --   --  8  --   --  8  --  11   < >  --    PAWAN 8.6  --   --   --  8.1*  --   --  7.8*  --  8.6   < >  --    GLC 92  --   --   --  129*  --  127* 135*   < > 118*   < >  --    ALBUMIN 3.1*  --   --   --  2.6*  --   --   --   --  3.0*   < >  --    PROTTOTAL 6.3*  --   --   --  5.1*  --   --   --   --  6.0*   < >  --    BILITOTAL 1.2*  --   --   --  0.6  --   --   --   --  1.2*   < >  --    ALKPHOS 40  --   --   --  27*  --   --   --   --  40   < >  --    ALT 34  --   --   --  12  --   --   --   --  16   < >  --    AST 44*  --   --   --  22  --   --   --   --  56*   < >  --     < > = values in this interval not displayed.     No results found for this or any previous visit (from the past 24 hour(s)).

## 2022-06-26 NOTE — PROGRESS NOTES
06/26/22 1200   Signing Clinician's Name / Credentials   Signing clinician's name / credentials Mayte Burris OTR/L   Quick Adds   Rehab Discipline OT   Therapeutic Activity   Minutes of Treatment 22 minutes   Symptoms Noted During/After Treatment Fatigue   Treatment Detail Supine to sit with max assist x3. Patient able to sit at EOB with min assit for about 2 minutes. Then required min-mod assist x2. Patient sat at EOB for about 10 minutes. Completed AROM/PROM while seated at EOB. Patient has poor ROM of his shoulders.   ADL Training   Minutes of Treatment 13   Symptoms Noted During/After Treatment fatigue   Treatment Instruction in compensatory methods   Treatment Detail Mod assist to wash face and total assist to comb hair. Offered patient pudding while sitting EOB but patient refused patient did drink some ensure with straw.   Patient Response Inconsistent ability to follow techniques   OT Discharge Planning   OT Discharge Recommendation (DC Rec) Transitional Care Facility   OT Rationale for DC Rec Patient requiring mod-max assist with bed mobility, sitting EOB and with ADLs.   Additional Documentation   OT Plan Continue with OT while hospitalized.   Total Session Time   Total Session Time (minutes) 35 minutes

## 2022-06-26 NOTE — PROGRESS NOTES
Patient is more alert and oriented this afternoon.  He is aware that he is in the hospital, he was able to give me his home phone number and he talked to his wife on the phone.  He denies pain, states he is not hungry and does not want to eat.

## 2022-06-26 NOTE — PROGRESS NOTES
06/26/22 1300   Signing Clinician's Name / Credentials   Signing clinician's name / credentials Alysia Knight DPT / Karl Yang SPT   Quick Adds   Rehab Discipline PT   Quick Adds Interventions Therapeutic Activity   Therapeutic Activity   Minutes of Treatment 15 minutes   Symptoms Noted During/After Treatment Fatigue   Treatment Detail Bed mobility supine to sit, sit to supine, rolling   Therapeutic Exercise   Minutes of Treatment 20   Symptoms Noted During/After Treatment fatigue   Treatment Detail AAROM with hip and knee flex and ankle DF then worked on sitting EOB. Minimal AROM w/ knee and ankle.   PT Discharge Planning   PT Discharge Recommendation (DC Rec) Transitional Care Facility   PT Rationale for DC Rec Still significant functional limitations.   PT Brief overview of current status Pt was more responsive today and could follow 1 step commands w/ constant cuing from PT. He was able to perform minimal AROM w/ knee extention and ankle DF. Sat EOB w/ mod assist x2 for 10min. Still demonstrates significant confusion of situation.   Additional Documentation   PT Plan Continue PT   Total Session Time   Total Session Time (minutes) 35 minutes

## 2022-06-26 NOTE — PROGRESS NOTES
Progress Note  Markedly elevated blood pressure overnight with increased agitation.  Hydralazine increased to 20 mg.  Pressures continue to remain tran. Labetalol 10 mg PRN also added.  Valium given to help agitation.    Alma Ramirez MD  Pulmonary, Critical Care and Sleep Medicine  [facility]  Pager: [phone]

## 2022-06-27 ENCOUNTER — APPOINTMENT (OUTPATIENT)
Dept: OCCUPATIONAL THERAPY | Facility: OTHER | Age: 72
DRG: 673 | End: 2022-06-27
Payer: MEDICARE

## 2022-06-27 ENCOUNTER — APPOINTMENT (OUTPATIENT)
Dept: PHYSICAL THERAPY | Facility: OTHER | Age: 72
DRG: 673 | End: 2022-06-27
Payer: MEDICARE

## 2022-06-27 ENCOUNTER — ANESTHESIA (OUTPATIENT)
Dept: MEDSURG UNIT | Facility: OTHER | Age: 72
DRG: 673 | End: 2022-06-27
Payer: MEDICARE

## 2022-06-27 ENCOUNTER — ANESTHESIA EVENT (OUTPATIENT)
Dept: MEDSURG UNIT | Facility: OTHER | Age: 72
DRG: 673 | End: 2022-06-27
Payer: MEDICARE

## 2022-06-27 ENCOUNTER — APPOINTMENT (OUTPATIENT)
Dept: SPEECH THERAPY | Facility: OTHER | Age: 72
DRG: 673 | End: 2022-06-27
Attending: FAMILY MEDICINE
Payer: MEDICARE

## 2022-06-27 PROBLEM — I26.99 PULMONARY EMBOLISM (H): Status: ACTIVE | Noted: 2022-06-27

## 2022-06-27 PROBLEM — I35.8 AORTIC VALVE MASS: Status: ACTIVE | Noted: 2022-06-27

## 2022-06-27 PROBLEM — L89.301 PRESSURE INJURY OF BUTTOCK, STAGE 1: Status: ACTIVE | Noted: 2022-06-27

## 2022-06-27 LAB
ALBUMIN SERPL-MCNC: 3.3 G/DL (ref 3.5–5.7)
ALP SERPL-CCNC: 42 U/L (ref 34–104)
ALT SERPL W P-5'-P-CCNC: 37 U/L (ref 7–52)
ANION GAP SERPL CALCULATED.3IONS-SCNC: 11 MMOL/L (ref 3–14)
AST SERPL W P-5'-P-CCNC: 38 U/L (ref 13–39)
BACTERIA BLD CULT: NO GROWTH
BACTERIA BLD CULT: NO GROWTH
BILIRUB DIRECT SERPL-MCNC: 0.4 MG/DL (ref 0–0.2)
BILIRUB SERPL-MCNC: 1.1 MG/DL (ref 0.3–1)
BUN SERPL-MCNC: 15 MG/DL (ref 7–25)
CALCIUM SERPL-MCNC: 9 MG/DL (ref 8.6–10.3)
CHLORIDE BLD-SCNC: 101 MMOL/L (ref 98–107)
CO2 SERPL-SCNC: 26 MMOL/L (ref 21–31)
CREAT SERPL-MCNC: 1.02 MG/DL (ref 0.7–1.3)
CRP SERPL-MCNC: 20 MG/L
ERYTHROCYTE [DISTWIDTH] IN BLOOD BY AUTOMATED COUNT: 13.4 % (ref 10–15)
ERYTHROCYTE [SEDIMENTATION RATE] IN BLOOD BY WESTERGREN METHOD: 77 MM/HR (ref 0–20)
GFR SERPL CREATININE-BSD FRML MDRD: 77 ML/MIN/1.73M2
GLUCOSE BLD-MCNC: 90 MG/DL (ref 70–105)
HCT VFR BLD AUTO: 40.9 % (ref 40–53)
HGB BLD-MCNC: 14 G/DL (ref 13.3–17.7)
HOLD SPECIMEN: NORMAL
LACTATE SERPL-SCNC: 1.5 MMOL/L (ref 0.7–2)
LACTATE SERPL-SCNC: 2.1 MMOL/L (ref 0.7–2)
MAGNESIUM SERPL-MCNC: 2.1 MG/DL (ref 1.9–2.7)
MCH RBC QN AUTO: 35 PG (ref 26.5–33)
MCHC RBC AUTO-ENTMCNC: 34.2 G/DL (ref 31.5–36.5)
MCV RBC AUTO: 102 FL (ref 78–100)
PHOSPHATE SERPL-MCNC: 2.7 MG/DL (ref 2.5–5)
PLATELET # BLD AUTO: 309 10E3/UL (ref 150–450)
POTASSIUM BLD-SCNC: 3.3 MMOL/L (ref 3.5–5.1)
POTASSIUM BLD-SCNC: 3.3 MMOL/L (ref 3.5–5.1)
POTASSIUM BLD-SCNC: 4.6 MMOL/L (ref 3.5–5.1)
PROCALCITONIN SERPL-MCNC: <0.5 NG/ML
PROT SERPL-MCNC: 6.2 G/DL (ref 6.4–8.9)
RBC # BLD AUTO: 4 10E6/UL (ref 4.4–5.9)
SODIUM SERPL-SCNC: 138 MMOL/L (ref 134–144)
WBC # BLD AUTO: 12.3 10E3/UL (ref 4–11)

## 2022-06-27 PROCEDURE — 80053 COMPREHEN METABOLIC PANEL: CPT | Performed by: INTERNAL MEDICINE

## 2022-06-27 PROCEDURE — 83605 ASSAY OF LACTIC ACID: CPT | Performed by: INTERNAL MEDICINE

## 2022-06-27 PROCEDURE — 250N000011 HC RX IP 250 OP 636: Performed by: INTERNAL MEDICINE

## 2022-06-27 PROCEDURE — 250N000011 HC RX IP 250 OP 636: Performed by: FAMILY MEDICINE

## 2022-06-27 PROCEDURE — 85652 RBC SED RATE AUTOMATED: CPT | Performed by: INTERNAL MEDICINE

## 2022-06-27 PROCEDURE — 84132 ASSAY OF SERUM POTASSIUM: CPT | Performed by: FAMILY MEDICINE

## 2022-06-27 PROCEDURE — 250N000013 HC RX MED GY IP 250 OP 250 PS 637: Performed by: FAMILY MEDICINE

## 2022-06-27 PROCEDURE — 84100 ASSAY OF PHOSPHORUS: CPT | Performed by: FAMILY MEDICINE

## 2022-06-27 PROCEDURE — 92610 EVALUATE SWALLOWING FUNCTION: CPT | Mod: GN

## 2022-06-27 PROCEDURE — 82248 BILIRUBIN DIRECT: CPT | Performed by: INTERNAL MEDICINE

## 2022-06-27 PROCEDURE — 83735 ASSAY OF MAGNESIUM: CPT | Performed by: FAMILY MEDICINE

## 2022-06-27 PROCEDURE — 87040 BLOOD CULTURE FOR BACTERIA: CPT | Performed by: INTERNAL MEDICINE

## 2022-06-27 PROCEDURE — 36415 COLL VENOUS BLD VENIPUNCTURE: CPT | Performed by: INTERNAL MEDICINE

## 2022-06-27 PROCEDURE — 120N000001 HC R&B MED SURG/OB

## 2022-06-27 PROCEDURE — 36415 COLL VENOUS BLD VENIPUNCTURE: CPT | Performed by: FAMILY MEDICINE

## 2022-06-27 PROCEDURE — 84145 PROCALCITONIN (PCT): CPT | Performed by: INTERNAL MEDICINE

## 2022-06-27 PROCEDURE — 99232 SBSQ HOSP IP/OBS MODERATE 35: CPT | Performed by: INTERNAL MEDICINE

## 2022-06-27 PROCEDURE — 97530 THERAPEUTIC ACTIVITIES: CPT | Mod: GP

## 2022-06-27 PROCEDURE — 97530 THERAPEUTIC ACTIVITIES: CPT | Mod: GO | Performed by: OCCUPATIONAL THERAPIST

## 2022-06-27 PROCEDURE — 85014 HEMATOCRIT: CPT | Performed by: INTERNAL MEDICINE

## 2022-06-27 PROCEDURE — 36416 COLLJ CAPILLARY BLOOD SPEC: CPT | Performed by: INTERNAL MEDICINE

## 2022-06-27 PROCEDURE — 86140 C-REACTIVE PROTEIN: CPT | Performed by: INTERNAL MEDICINE

## 2022-06-27 PROCEDURE — 36416 COLLJ CAPILLARY BLOOD SPEC: CPT | Performed by: FAMILY MEDICINE

## 2022-06-27 PROCEDURE — 250N000013 HC RX MED GY IP 250 OP 250 PS 637: Performed by: INTERNAL MEDICINE

## 2022-06-27 PROCEDURE — C9113 INJ PANTOPRAZOLE SODIUM, VIA: HCPCS | Performed by: INTERNAL MEDICINE

## 2022-06-27 RX ORDER — LOPERAMIDE HCL 2 MG
2 CAPSULE ORAL 4 TIMES DAILY PRN
Status: DISCONTINUED | OUTPATIENT
Start: 2022-06-27 | End: 2022-07-08

## 2022-06-27 RX ORDER — ACETAMINOPHEN 500 MG
1000 TABLET ORAL EVERY 6 HOURS PRN
Status: DISCONTINUED | OUTPATIENT
Start: 2022-06-27 | End: 2022-07-10

## 2022-06-27 RX ORDER — FAMOTIDINE 20 MG/1
20 TABLET, FILM COATED ORAL 2 TIMES DAILY
Status: DISCONTINUED | OUTPATIENT
Start: 2022-06-27 | End: 2022-07-05

## 2022-06-27 RX ORDER — POTASSIUM CHLORIDE 7.45 MG/ML
10 INJECTION INTRAVENOUS
Status: COMPLETED | OUTPATIENT
Start: 2022-06-27 | End: 2022-06-27

## 2022-06-27 RX ORDER — ALBUTEROL SULFATE 0.83 MG/ML
2.5 SOLUTION RESPIRATORY (INHALATION) EVERY 6 HOURS PRN
Status: DISCONTINUED | OUTPATIENT
Start: 2022-06-27 | End: 2022-07-12 | Stop reason: HOSPADM

## 2022-06-27 RX ADMIN — QUETIAPINE FUMARATE 12.5 MG: 25 TABLET ORAL at 21:26

## 2022-06-27 RX ADMIN — RIVAROXABAN 15 MG: 15 TABLET, FILM COATED ORAL at 10:02

## 2022-06-27 RX ADMIN — POTASSIUM CHLORIDE 10 MEQ: 7.46 INJECTION, SOLUTION INTRAVENOUS at 08:47

## 2022-06-27 RX ADMIN — FAMOTIDINE 20 MG: 20 TABLET ORAL at 21:26

## 2022-06-27 RX ADMIN — POTASSIUM CHLORIDE 10 MEQ: 7.46 INJECTION, SOLUTION INTRAVENOUS at 06:40

## 2022-06-27 RX ADMIN — TAZOBACTAM SODIUM AND PIPERACILLIN SODIUM 4.5 G: 500; 4 INJECTION, SOLUTION INTRAVENOUS at 00:01

## 2022-06-27 RX ADMIN — TAZOBACTAM SODIUM AND PIPERACILLIN SODIUM 4.5 G: 500; 4 INJECTION, SOLUTION INTRAVENOUS at 17:12

## 2022-06-27 RX ADMIN — THIAMINE HCL TAB 100 MG 300 MG: 100 TAB at 10:01

## 2022-06-27 RX ADMIN — NICOTINE 1 PATCH: 14 PATCH, EXTENDED RELEASE TRANSDERMAL at 07:27

## 2022-06-27 RX ADMIN — LISINOPRIL 20 MG: 20 TABLET ORAL at 10:01

## 2022-06-27 RX ADMIN — RIVAROXABAN 15 MG: 15 TABLET, FILM COATED ORAL at 17:12

## 2022-06-27 RX ADMIN — POTASSIUM CHLORIDE 10 MEQ: 7.46 INJECTION, SOLUTION INTRAVENOUS at 07:43

## 2022-06-27 RX ADMIN — HYDRALAZINE HYDROCHLORIDE 20 MG: 20 INJECTION INTRAMUSCULAR; INTRAVENOUS at 19:32

## 2022-06-27 RX ADMIN — TAZOBACTAM SODIUM AND PIPERACILLIN SODIUM 4.5 G: 500; 4 INJECTION, SOLUTION INTRAVENOUS at 05:51

## 2022-06-27 RX ADMIN — HYDRALAZINE HYDROCHLORIDE 20 MG: 20 INJECTION INTRAMUSCULAR; INTRAVENOUS at 02:15

## 2022-06-27 RX ADMIN — QUETIAPINE FUMARATE 12.5 MG: 25 TABLET ORAL at 15:32

## 2022-06-27 RX ADMIN — HYDROMORPHONE HYDROCHLORIDE 0.3 MG: 1 INJECTION, SOLUTION INTRAMUSCULAR; INTRAVENOUS; SUBCUTANEOUS at 06:18

## 2022-06-27 RX ADMIN — PANTOPRAZOLE SODIUM 40 MG: 40 INJECTION, POWDER, FOR SOLUTION INTRAVENOUS at 07:19

## 2022-06-27 RX ADMIN — TAZOBACTAM SODIUM AND PIPERACILLIN SODIUM 4.5 G: 500; 4 INJECTION, SOLUTION INTRAVENOUS at 12:07

## 2022-06-27 RX ADMIN — POTASSIUM CHLORIDE 10 MEQ: 7.46 INJECTION, SOLUTION INTRAVENOUS at 09:59

## 2022-06-27 ASSESSMENT — ACTIVITIES OF DAILY LIVING (ADL)
ADLS_ACUITY_SCORE: 60
ADLS_ACUITY_SCORE: 56
ADLS_ACUITY_SCORE: 60
ADLS_ACUITY_SCORE: 56
ADLS_ACUITY_SCORE: 60

## 2022-06-27 NOTE — PROGRESS NOTES
Patient was offered fluids to drink and oral cares and patient refused. Isaac Smith RN on 6/27/2022 at 12:20 AM

## 2022-06-27 NOTE — PROGRESS NOTES
[facility]    Medicine Progress Note - Hospitalist Service    Date of Admission:  6/17/2022    Assessment & Plan    Antonio Rutherford is a 72 year old male admitted on 6/17/2022. He presented from the alf for concerns of possible kidney stone versus sepsis.     Patient was admitted to the alf 7 days PTA.  He is a daily drinker of whiskey and was treated for alcohol withdrawal at the alf and then started on Bactrim for possible UTI 4 days PTA.  He had extremely poor oral intake PTA (20 oz in 7d per alf nurse report).    Patient was admitted treated with thiamine, folate and IV fluids, strict I's and O's and close monitoring.  Developed worsening confusion and somnolence and concern to protect his airway.  Got intubated on 6/21/2022, extubated 6/24/2022.       Alcohol dependence with withdrawal delirium (H):   Assessment: Sedated and intubated in the ICU on 6/21/22 for somnolence, extubated on 6/24/2022. Ammonia level was normal, ABG did not show significant CO2 retention, head CT and brain MRI neg for acute findings. Repeat CT of the head negative.  Unlikely to have ongoing significant alcohol withdrawal at this time.  -Discontinue CIWA  -Minimize sedating medication  -Appreciate social work input for chemical dependency needs when mental status clear    Acute PE, POA: Diagnosed 6/17, likely provoked from immobility and acute illness.  -Initially started on lovenox 1.5mg/kg/d now able to take oral medication  -Transition to Xarelto for PE treatment    Aortic valve echodensity: Probable chronic calcific nodule, could also represent small clot, unlikely to represent infective endocarditis process with initial negative blood cultures.  No evidence of CVA given MRI findings and at this point given how critically ill he has been will anticoagulate as above, monitor for clinical improvement and consider repeat ELLEN as an outpatient to ensure stability/resolution.  -Repeat blood cultures today    CHARLOTTE  due to ATN (acute tubular necrosis) (H): Resolved, creatinine peaked at 2.7, likely from prerenal component from hypovolemia and infection and possible ATN from Bactrim/ibuprofen prior to admission.    Plan:  -Monitor daily   -avoid NSAIDs and nephrotoxins     Community-acquired versus aspiration pneumonia: Possibly contributing to need for intubation with possible component of aspiration given alcohol abuse  -Complete Zosyn, day 7 today     UTI. POA: Empirically treated with Bactrim, Urine culture negative.  No growth in urine culture. Blood cultures negative.   -Discontinue Escobedo today with trial of void    Essential hypertension: Hypertensive, hydralazine and labetalol added but hypertension likely from agitation.  Resolves when he is sleeping.   -Continue as needed hydralazine and-labetalol  -Continue home ACE  -Discontinue metoprolol given resting bradycardia and 60    Severe protein calorie malnutrition: Generally poor oral intake  -Inpatient dietary eval      Sinus tachycardia: Resolved, likely from withdrawal.  Got amiodarone x24 hours. Now HR regular and controlled.    Plan:   -Discontinue telemetry    Encephalopathy, metabolic. CT and MRI head/brain negative for acute findings. Likely due to ICU acquired delirium at this time.  Cannot exclude encephalopathy , he should be out of the timeframe for withdrawal.    Plan:   -Delirium order set plan      diarrhea: Resolved  -C. difficile and stool culture negative.    -Imodium as needed    Smoker.  No wheezing or contribution of COPD exacerbation during his stay   -Continue steroids    Stage 1 pressure ulcer of buttocks  Heel pressure ulcer stage 1. Left foot.   -offload    Hypokalemia  Likely from poor oral intake  Replace per protocol-       Diet: Soft & Bite Sized Diet (level 6) Liquidized/Moderately Thick (level 3)    DVT Prophylaxis: DOAC  Escobedo Catheter: PRESENT, indication: Strict 1-2 Hour I&O  Central Lines: None  Cardiac Monitoring: None  Code Status:  Full Code      Disposition Plan     Pending       The patient's care was discussed with the patient and bedside nurse    Jamie Varner MD  Hospitalist Service  [facility]  Securely message with the Vocera Web Console (learn more here)  Text page via [facility] Paging/Directory         Clinically Significant Risk Factors Present on Admission                      ______________________________________________________________________    Interval History   Overnight no acute events and afebrile, patient does not want to eat breakfast, denies any pain, poor historian and unable to give any meaningful information.    data reviewed today: I reviewed all medications, new labs and imaging results over the last 24 hours. I personally reviewed no images or EKG's today.    Physical Exam   Vital Signs: Temp: 97.8  F (36.6  C) Temp src: Tympanic BP: (!) 187/96 Pulse: 80   Resp: 22 SpO2: 97 % O2 Device: None (Room air)    Weight: 205 lbs 11.03 oz    Exam:   GENERAL: Laying in bed, somnolent but awakens to voice and light touch, tracking, following commands, in no apparent distress.  CARDIOVASCULAR: regular rate and rhythm, no murmurs, rubs, or gallops. Normal S1/S2. No lower extremity edema.   RESPIRATORY: clear to auscultation bilaterally, no wheezes, no crackles.   GI: soft, non-tender, non-distended, normoactive bowel sounds.  : Escobedo in place with concentrated yellow urine.  MUSCULOSKELETAL: warm and well perfused, 2+ dorsalis pedis pulses bilaterally.    SKIN: no pallor,jaundice, or rashes.  Right IJ CVC in place covered with Tegaderm, clean dry and intact.      Data   Recent Labs   Lab 06/27/22  0536 06/26/22  0455 06/26/22  0006 06/25/22  0427 06/24/22  0510 06/23/22  1333 06/23/22  0651   WBC 12.3*  --   --   --  8.3  --  8.3   HGB 14.0  --   --   --  11.6*  --  11.5*   *  --   --   --  106*  --  104*     --   --   --  189  --  177    140  --   --  139  --  143   POTASSIUM 3.3*  3.3*  3.5 3.5   < > 3.6   < > 3.2*   CHLORIDE 101 101  --   --  106  --  107   CO2 26 30  --   --  25  --  28   BUN 15 12  --   --  27*  --  31*   CR 1.02 1.04  --   --  1.21  --  1.41*   ANIONGAP 11 9  --   --  8  --  8   PAWAN 9.0 8.6  --   --  8.1*  --  7.8*   GLC 90 92  --   --  129*   < > 135*   ALBUMIN 3.3* 3.1*  --   --  2.6*  --   --    PROTTOTAL 6.2* 6.3*  --   --  5.1*  --   --    BILITOTAL 1.1* 1.2*  --   --  0.6  --   --    ALKPHOS 42 40  --   --  27*  --   --    ALT 37 34  --   --  12  --   --    AST 38 44*  --   --  22  --   --     < > = values in this interval not displayed.     No results found for this or any previous visit (from the past 24 hour(s)).

## 2022-06-27 NOTE — PROGRESS NOTES
Report given to RONALDO Arredondo. Transferred via bed to room 306. Inpatient medications and paper chart sent with patient. Personal belongings also sent with patient. Tolerated transfer.

## 2022-06-27 NOTE — PROGRESS NOTES
Clinical Nutrition /Reassessment     Assessment:   Client History:  Moved to Med/surg, writer visited with nursing about the plan for supplements. Will bring supplements and thickener to unit and staff will provide as able when appropriate and tolerated. He has been refusing most meals still. Nursing did state he was eating a little with the help of his son earlier. Encourage intakes.   Diet Order:  IDDSI level 6 foods (soft and bite sized), level 3 fluids (moderately thick)-speech therapy working with him as he allows   Oral Intake:  Intubated from 6/21-6/24, refusing to eat or eating minimally prior to that and since  Supplements: to be thickened and offered as tolerated. Will not add to orders at this time to minimize waste.   Weight:   Wt Readings from Last 10 Encounters:   06/27/22 93.3 kg (205 lb 11 oz)   04/18/19 104.8 kg (231 lb)   03/20/19 104.3 kg (230 lb)   03/12/19 104.3 kg (230 lb)   09/12/17 99.8 kg (220 lb)   06/18/15 100.7 kg (222 lb)   06/16/15 100.2 kg (221 lb)   11/29/12 90.7 kg (200 lb)     Malnutrition Criteria:  (Need to have 2 indicators to qualify recommendation)  Energy Intake:  Acute Severe: </= 50% of estimated energy requirement for >/= 5 days  Interpretation of Weight Loss:  No significant weight loss in past year noted per chart review and lack of information in the time frame  Physical Findings:  No significant findings  Reduced  Strength:  noted weakness, likely reduced with current illness    Recommended Nutrition Diagnosis:   Severe Malnutrition in the context of acute illness or injury - based on AND/ASPEN Clinical Characterstics of Malnutrition May 2012      Nutrition Education: Nutrition education will be provided as appropriate.    Nutrition Diagnosis: Oral or Nutrition Support Intake:  inadequate energy intakes related to refusal to consume oral nutrition/NPO status with intubation as evidenced by minimal/no intakes x past 6 days and prior to that, poor  intakes    Intervention:  Nutrition Prescription:     Nutrition Intervention(s):Recommended general, healthful diet --IDDSI level 6 foods (soft and bite sized), level 3 fluids (moderately thick).  1. Supplements: offer supplements as he allows and tolerates  2. Staff to assist as needed and allowed for meals/snacks     Nutrition Goal(s):  1. Pt will consume 50% or more at meals- not met, on going   2. Pt will not have unplanned wt loss during hospitalization - on going  3. Pt will tolerate diet as ordered- on going, speech working with him for most appropriate textures    Monitoring and Evaluation:   Food Intake, tolerance, weight     Discharge Recommendation:   Nutrition Discharge Planning  Will address closer to discharge     RD will reassess in within 1-5 days or sooner.  Leidy Joyce RD on 6/27/2022 at 2:32 PM

## 2022-06-27 NOTE — PROGRESS NOTES
Alert and oriented to self and place. Very forgetful, continues to ask to go to the bathroom to pee, continue to remind pt he has a archer catheter in place. Offered breakfast and oral cares, pt declined. Denies any pain. mepilex reapplied to coccyx. Blanchable redness surrounding blackened area remain. Excoriated area present to alexys area. Will continue to monitor.

## 2022-06-27 NOTE — PROGRESS NOTES
Called Dr. Mata about increased agitation and was told to give dilaudid then to try valium if no results with dilaudid. Also told to give valium overnight to help with agitation. Isaac Smith RN on 6/26/2022 at 10:30 PM

## 2022-06-27 NOTE — PLAN OF CARE
Goal Outcome Evaluation:    Patient transferred from ICU to [facility]. Disoriented to time and situation. Continues to ask if he can go home. Wife and son at bedside. Denies pain. CIWA of 4 for tremors, anxiety, and slight agitation. LS dim in bases. Incontinent of stool. Escobedo catheter removed in ICU, voided 100 mL. Incontinent of some urine. Open area to right thigh, aleevyn placed. Buttock black eschar, mepilex placed. CCRQ2. Up with PT/OT to stand at bedside A2 with walker, heavy assist.

## 2022-06-27 NOTE — PLAN OF CARE
"Patient is alert but confused, orientated to person only. Easily becomes agitated with any nursing cares or when asked simple questions such as if he wants food, restless in bed often, gave prn seroquel. Thinks he is at his \"own residence\" and often yells out for \"[place]\". Calm environment provided, door has to remain open for patient safety. Will continue to monitor.     /67 (BP Location: Right arm, Patient Position: Semi-Fitzgerald's)   Pulse 71   Temp 98.8  F (37.1  C) (Tympanic)   Resp 22   Ht 1.778 m (5' 10\")   Wt 93.3 kg (205 lb 11 oz)   SpO2 98%   BMI 29.51 kg/m        Problem: Behavioral Health Comorbidity  Goal: Maintenance of Behavioral Health Symptom Control  Outcome: Ongoing, Not Progressing     Problem: Oral Intake Inadequate  Goal: Optimal Oral Intake  Outcome: Ongoing, Not Progressing   Goal Outcome Evaluation:                      "

## 2022-06-27 NOTE — PROGRESS NOTES
06/27/22 1500   Signing Clinician's Name / Credentials   Signing clinician's name / credentials Luz Elena Linn DPT   Quick Adds   Rehab Discipline PT   Quick Adds Interventions Therapeutic Activity   Therapeutic Activity   Symptoms Noted During/After Treatment Fatigue   Treatment Detail bed mobility supine to sitting with HOB raised with max assist x 2 (pt able to advance legs 50% toward EOB), sitting at EOB with intermittent min assist for trunk support (pt continues to need cues of what we are doing and why, trying to stand while getting hair cleaned up and when he is supposed to sit EOB). sitting EOB for about 10 min during cares. sit<>stand max assist x 2 with 75% standing at FWw and heavy posterior lean and standing for less than 30 sec. mod assist x 2 for sitting to supine and max assist x 2 for boosting in bed.   PT Discharge Planning   PT Discharge Recommendation (DC Rec) Transitional Care Facility   PT Rationale for DC Rec maximize functional capacity and safety   PT Brief overview of current status Pt able to follow 1 step cues but heavily distracted and needed reminders on what he was doing constantly. Pt needing max assist x 2 for supine to sitting EOB, min assist to maintain sitting EOB x 10 min, max assist x 1 for standing at FWW and mod assist x 2 for sitting to supine in bed.   Additional Documentation   PT Plan Continue PT   Total Session Time   Total Session Time (minutes) 40 minutes

## 2022-06-27 NOTE — PROGRESS NOTES
Patient has been upset with staff this morning and attempts to get out of bed to go home. Patient has been reminded numerous times that he is in the hospital and that he is not safe to go home. Patient gets frustrated and yells at staff. Patient blood pressure is elevated this morning due to agitation. Will give another dose of dilaudid to help with pain and calm patient. Isaac Smith RN on 6/27/2022 at 6:10 AM

## 2022-06-27 NOTE — PROGRESS NOTES
Patient had a seven beat run of V-tach before returning to sinus rhythm. Will continue to monitor. Isaac Smith RN on 6/27/2022 at 12:17 AM

## 2022-06-27 NOTE — PROGRESS NOTES
Pt was seen for concerns of drooling and poor oral intake. Pt was placed on modified diet of IDDSI 3 Mod thick and IDDSI 6 soft and bite sized by MD/Nursing staff over the weekend. Pt was upright in bed upon arrival, and disoriented to person and place. He trialed 1oz of thin liquid via straw; overtly aspirating (coughing and throat clearing) and required 3x swallows. Pt attempted to eat regular texture (Oreo cookie) and required cue to take a bite after initially bringing to mouth without taking bite, demonstrating with mod/severe oral weakness and prolonged mastication. Pt refusing any other PO trials. Pt to remain on current diet (IDDSI 6 and 3) due to overt s/s of aspiration, oral weakness, and cognitive status. ST to follow for duration of stay. MD notified of assessment results.    Rupa Hess, CCC-SLP

## 2022-06-27 NOTE — PLAN OF CARE
Problem: Fall Injury Risk  Goal: Absence of Fall and Fall-Related Injury  Outcome: Ongoing, Not Progressing  Intervention: Identify and Manage Contributors  Recent Flowsheet Documentation  Taken 6/26/2022 1911 by Isaac Smith RN  Medication Review/Management: medications reviewed  Intervention: Promote Injury-Free Environment  Recent Flowsheet Documentation  Taken 6/27/2022 0001 by Isaac Smith RN  Safety Promotion/Fall Prevention: safety round/check completed  Taken 6/26/2022 1911 by Luis, Isaac, RN  Safety Promotion/Fall Prevention:   assistive device/personal items within reach   clutter free environment maintained   fall prevention program maintained   increased rounding and observation   increase visualization of patient   lighting adjusted   patient and family education   room door open   room near nurse's station   room organization consistent   safety round/check completed     Problem: Behavioral Health Comorbidity  Goal: Maintenance of Behavioral Health Symptom Control  Outcome: Ongoing, Not Progressing  Intervention: Maintain Behavioral Health Symptom Control  Recent Flowsheet Documentation  Taken 6/26/2022 1911 by Isaac Smith RN  Medication Review/Management: medications reviewed     Problem: Oral Intake Inadequate  Goal: Optimal Oral Intake  Outcome: Ongoing, Not Progressing     Problem: Alcohol Withdrawal  Goal: Alcohol Withdrawal Symptom Control  Outcome: Ongoing, Progressing  Intervention: Minimize or Manage Alcohol Withdrawal Symptoms  Recent Flowsheet Documentation  Taken 6/27/2022 0001 by Isaac Smith RN  Sensory Stimulation Regulation:   auditory stimulation minimized   care clustered   quiet environment promoted   tactile stimulation minimized   visitors limited   visual stimulation minimized  Aspiration Precautions:   awake/alert before oral intake   food consistency adjusted   food texture adjusted  Seizure Precautions:   activity supervised    clutter-free environment maintained   side rails padded  Taken 6/26/2022 1911 by Isaac Smith, RN  Sensory Stimulation Regulation:   auditory stimulation minimized   care clustered   quiet environment promoted   tactile stimulation minimized   visitors limited   visual stimulation minimized  Aspiration Precautions:   awake/alert before oral intake   food consistency adjusted   food texture adjusted  Seizure Precautions:   activity supervised   clutter-free environment maintained   side rails padded   Goal Outcome Evaluation:

## 2022-06-27 NOTE — PHARMACY
Pharmacy - Transfer Medication Reconciliation     The patient's transfer medication orders have been compared to the medication administration record and to the Prior to Admissions Medications list - any noted discrepancies were resolved with the MD.     Thank you. Pharmacy will continue to monitor.     Kuldeep Hanson GM ....................  6/27/2022   12:02 PM

## 2022-06-27 NOTE — PROGRESS NOTES
Admission Note    Data:  Antonio Rutherford admitted to 306 from ICU at 1100.      Action:  Dr. Varner has been notified of admission. Pt oriented to unit, call light in reach.     Response:  Patient tolerated well .

## 2022-06-28 ENCOUNTER — APPOINTMENT (OUTPATIENT)
Dept: SPEECH THERAPY | Facility: OTHER | Age: 72
DRG: 673 | End: 2022-06-28
Payer: MEDICARE

## 2022-06-28 ENCOUNTER — APPOINTMENT (OUTPATIENT)
Dept: OCCUPATIONAL THERAPY | Facility: OTHER | Age: 72
DRG: 673 | End: 2022-06-28
Payer: MEDICARE

## 2022-06-28 ENCOUNTER — APPOINTMENT (OUTPATIENT)
Dept: PHYSICAL THERAPY | Facility: OTHER | Age: 72
DRG: 673 | End: 2022-06-28
Payer: MEDICARE

## 2022-06-28 LAB
ANION GAP SERPL CALCULATED.3IONS-SCNC: 12 MMOL/L (ref 3–14)
BUN SERPL-MCNC: 18 MG/DL (ref 7–25)
CALCIUM SERPL-MCNC: 8.7 MG/DL (ref 8.6–10.3)
CHLORIDE BLD-SCNC: 103 MMOL/L (ref 98–107)
CO2 SERPL-SCNC: 23 MMOL/L (ref 21–31)
CREAT SERPL-MCNC: 0.82 MG/DL (ref 0.7–1.3)
ERYTHROCYTE [DISTWIDTH] IN BLOOD BY AUTOMATED COUNT: 13.8 % (ref 10–15)
GFR SERPL CREATININE-BSD FRML MDRD: >90 ML/MIN/1.73M2
GLUCOSE BLD-MCNC: 84 MG/DL (ref 70–105)
GLUCOSE BLDC GLUCOMTR-MCNC: 128 MG/DL (ref 70–99)
GLUCOSE BLDC GLUCOMTR-MCNC: 62 MG/DL (ref 70–99)
GLUCOSE BLDC GLUCOMTR-MCNC: 77 MG/DL (ref 70–99)
GLUCOSE BLDC GLUCOMTR-MCNC: 85 MG/DL (ref 70–99)
GLUCOSE BLDC GLUCOMTR-MCNC: 86 MG/DL (ref 70–99)
HCT VFR BLD AUTO: 40.1 % (ref 40–53)
HGB BLD-MCNC: 13.3 G/DL (ref 13.3–17.7)
HOLD SPECIMEN: NORMAL
INR PPP: 1.13 (ref 0.85–1.15)
MAGNESIUM SERPL-MCNC: 2 MG/DL (ref 1.9–2.7)
MCH RBC QN AUTO: 34.3 PG (ref 26.5–33)
MCHC RBC AUTO-ENTMCNC: 33.2 G/DL (ref 31.5–36.5)
MCV RBC AUTO: 103 FL (ref 78–100)
PHOSPHATE SERPL-MCNC: 3.3 MG/DL (ref 2.5–5)
PLATELET # BLD AUTO: 289 10E3/UL (ref 150–450)
POTASSIUM BLD-SCNC: 3.4 MMOL/L (ref 3.5–5.1)
POTASSIUM BLD-SCNC: 3.9 MMOL/L (ref 3.5–5.1)
PREALB SERPL IA-MCNC: 20 MG/DL (ref 15–45)
RBC # BLD AUTO: 3.88 10E6/UL (ref 4.4–5.9)
SODIUM SERPL-SCNC: 138 MMOL/L (ref 134–144)
WBC # BLD AUTO: 9.2 10E3/UL (ref 4–11)

## 2022-06-28 PROCEDURE — 258N000001 HC RX 258: Performed by: INTERNAL MEDICINE

## 2022-06-28 PROCEDURE — 97530 THERAPEUTIC ACTIVITIES: CPT | Mod: GP

## 2022-06-28 PROCEDURE — 85027 COMPLETE CBC AUTOMATED: CPT | Performed by: INTERNAL MEDICINE

## 2022-06-28 PROCEDURE — 97530 THERAPEUTIC ACTIVITIES: CPT | Mod: GO | Performed by: OCCUPATIONAL THERAPIST

## 2022-06-28 PROCEDURE — 84100 ASSAY OF PHOSPHORUS: CPT | Performed by: FAMILY MEDICINE

## 2022-06-28 PROCEDURE — 250N000011 HC RX IP 250 OP 636: Performed by: INTERNAL MEDICINE

## 2022-06-28 PROCEDURE — 36415 COLL VENOUS BLD VENIPUNCTURE: CPT | Performed by: INTERNAL MEDICINE

## 2022-06-28 PROCEDURE — 120N000001 HC R&B MED SURG/OB

## 2022-06-28 PROCEDURE — 85610 PROTHROMBIN TIME: CPT | Performed by: INTERNAL MEDICINE

## 2022-06-28 PROCEDURE — 80048 BASIC METABOLIC PNL TOTAL CA: CPT | Performed by: INTERNAL MEDICINE

## 2022-06-28 PROCEDURE — 250N000013 HC RX MED GY IP 250 OP 250 PS 637: Performed by: INTERNAL MEDICINE

## 2022-06-28 PROCEDURE — 84134 ASSAY OF PREALBUMIN: CPT | Performed by: INTERNAL MEDICINE

## 2022-06-28 PROCEDURE — 83735 ASSAY OF MAGNESIUM: CPT | Performed by: FAMILY MEDICINE

## 2022-06-28 PROCEDURE — 250N000013 HC RX MED GY IP 250 OP 250 PS 637: Performed by: FAMILY MEDICINE

## 2022-06-28 PROCEDURE — 97116 GAIT TRAINING THERAPY: CPT | Mod: GP

## 2022-06-28 PROCEDURE — 84132 ASSAY OF SERUM POTASSIUM: CPT | Performed by: INTERNAL MEDICINE

## 2022-06-28 PROCEDURE — 99232 SBSQ HOSP IP/OBS MODERATE 35: CPT | Performed by: INTERNAL MEDICINE

## 2022-06-28 RX ORDER — NICOTINE POLACRILEX 4 MG
15-30 LOZENGE BUCCAL
Status: DISCONTINUED | OUTPATIENT
Start: 2022-06-28 | End: 2022-07-09

## 2022-06-28 RX ORDER — POTASSIUM CHLORIDE 7.45 MG/ML
10 INJECTION INTRAVENOUS
Status: COMPLETED | OUTPATIENT
Start: 2022-06-28 | End: 2022-06-28

## 2022-06-28 RX ORDER — OLANZAPINE 10 MG/2ML
5 INJECTION, POWDER, FOR SOLUTION INTRAMUSCULAR
Status: COMPLETED | OUTPATIENT
Start: 2022-06-28 | End: 2022-07-01

## 2022-06-28 RX ORDER — QUETIAPINE FUMARATE 25 MG/1
50 TABLET, FILM COATED ORAL AT BEDTIME
Status: DISCONTINUED | OUTPATIENT
Start: 2022-06-28 | End: 2022-06-29

## 2022-06-28 RX ORDER — DEXTROSE MONOHYDRATE 25 G/50ML
25-50 INJECTION, SOLUTION INTRAVENOUS
Status: DISCONTINUED | OUTPATIENT
Start: 2022-06-28 | End: 2022-07-09

## 2022-06-28 RX ADMIN — POTASSIUM CHLORIDE 10 MEQ: 7.46 INJECTION, SOLUTION INTRAVENOUS at 07:56

## 2022-06-28 RX ADMIN — POTASSIUM CHLORIDE 10 MEQ: 7.46 INJECTION, SOLUTION INTRAVENOUS at 10:16

## 2022-06-28 RX ADMIN — QUETIAPINE FUMARATE 50 MG: 25 TABLET ORAL at 20:51

## 2022-06-28 RX ADMIN — FAMOTIDINE 20 MG: 20 TABLET ORAL at 09:13

## 2022-06-28 RX ADMIN — QUETIAPINE FUMARATE 12.5 MG: 25 TABLET ORAL at 18:37

## 2022-06-28 RX ADMIN — POTASSIUM CHLORIDE 10 MEQ: 7.46 INJECTION, SOLUTION INTRAVENOUS at 09:13

## 2022-06-28 RX ADMIN — DEXTROSE MONOHYDRATE 25 ML: 25 INJECTION, SOLUTION INTRAVENOUS at 16:56

## 2022-06-28 RX ADMIN — RIVAROXABAN 15 MG: 15 TABLET, FILM COATED ORAL at 09:13

## 2022-06-28 RX ADMIN — POTASSIUM CHLORIDE 10 MEQ: 7.46 INJECTION, SOLUTION INTRAVENOUS at 11:21

## 2022-06-28 RX ADMIN — LISINOPRIL 20 MG: 20 TABLET ORAL at 09:13

## 2022-06-28 RX ADMIN — QUETIAPINE FUMARATE 50 MG: 25 TABLET ORAL at 22:48

## 2022-06-28 RX ADMIN — THIAMINE HCL TAB 100 MG 300 MG: 100 TAB at 09:13

## 2022-06-28 RX ADMIN — RIVAROXABAN 15 MG: 15 TABLET, FILM COATED ORAL at 17:14

## 2022-06-28 ASSESSMENT — ACTIVITIES OF DAILY LIVING (ADL)
ADLS_ACUITY_SCORE: 50
ADLS_ACUITY_SCORE: 54
ADLS_ACUITY_SCORE: 60
ADLS_ACUITY_SCORE: 60
ADLS_ACUITY_SCORE: 54
ADLS_ACUITY_SCORE: 54
ADLS_ACUITY_SCORE: 60
ADLS_ACUITY_SCORE: 54
ADLS_ACUITY_SCORE: 60
ADLS_ACUITY_SCORE: 54
ADLS_ACUITY_SCORE: 50
ADLS_ACUITY_SCORE: 60

## 2022-06-28 NOTE — PROGRESS NOTES
[facility]    Medicine Progress Note - Hospitalist Service    Date of Admission:  6/17/2022    Assessment & Plan    Antonio Rutherford is a 72 year old male admitted on 6/17/2022. He presented from the retirement for concerns of possible kidney stone versus sepsis.     Patient was admitted to the retirement 7 days PTA.  He is a daily drinker of whiskey and was treated for alcohol withdrawal at the retirement and then started on Bactrim for possible UTI 4 days PTA.  He had extremely poor oral intake PTA (20 oz in 7d per retirement nurse report).    Patient was admitted treated with thiamine, folate and IV fluids, strict I's and O's and close monitoring.  Developed worsening confusion and somnolence and concern to protect his airway.  Got intubated on 6/21/2022, extubated 6/24/2022.       Alcohol dependence with withdrawal delirium (H):   Assessment: Resolved.  Sedated and intubated in the ICU on 6/21/22 for somnolence, extubated on 6/24/2022. Ammonia level was normal, ABG did not show significant CO2 retention, head CT and brain MRI neg for acute findings. Repeat CT of the head negative.    -Discontinue CIWA on 6/27  -Minimize sedating medication  -Appreciate social work input for chemical dependency needs when mental status clear    Encephalopathy, metabolic.  Significantly improved and oriented to person and hospital today, guesses a year.  CT and MRI head/brain negative for acute findings. Likely due to ICU acquired delirium at this time.  Cannot exclude encephalopathy , he should be out of the timeframe for withdrawal.    Plan:   -Delirium order set  -Appreciate PT/OT input for dispo needs    Stage 1 pressure ulcer of buttocks with area of unstageable eschar  Heel pressure ulcer stage 1. Left foot.  Present on admission  -offload   -Air mattress today  -Replace Escobedo today to minimize skin breakdown    Acute PE, POA: Diagnosed 6/17, likely provoked from immobility and acute illness.  -Initially started on lovenox  1.5mg/kg/d now able to take oral medication  -Transition to Xarelto for PE treatment    Aortic valve echodensity: Probable chronic calcific nodule, could also represent small clot, unlikely to represent infective endocarditis process with initial negative blood cultures.  No evidence of CVA given MRI findings and at this point given how critically ill he has been will anticoagulate as above, monitor for clinical improvement and consider repeat ELLEN as an outpatient to ensure stability/resolution.  -Follow-up repeat blood cultures    CHARLOTTE due to ATN (acute tubular necrosis) (H): Resolved, creatinine peaked at 2.7, likely from prerenal component from hypovolemia and infection and possible ATN from Bactrim/ibuprofen prior to admission.    Plan:  -Monitor daily   -avoid NSAIDs and nephrotoxins     Community-acquired versus aspiration pneumonia: Possibly contributing to need for intubation with possible component of aspiration given alcohol abuse  -Completed 7-day course of Zosyn    UTI. POA: Resolved.  Empirically treated with Bactrim, Urine culture negative.  No growth in urine culture. Blood cultures negative.     Essential hypertension: Stable.  -Continue as needed hydralazine and-labetalol  -Continue home ACE  -Discontinue metoprolol given resting bradycardia and 60    Severe protein calorie malnutrition: Generally poor oral intake  -Inpatient dietary eval      Sinus tachycardia: Resolved, likely from withdrawal.  Got amiodarone x24 hours. Now HR regular and controlled.    Plan:   -Discontinue telemetry     diarrhea: Resolved  -C. difficile and stool culture negative.    -Imodium as needed    Smoker.  No wheezing or contribution of COPD exacerbation during his stay   -Continue steroids    Hypokalemia  Likely from poor oral intake  Replace per protocol-       Diet: Soft & Bite Sized Diet (level 6) Liquidized/Moderately Thick (level 3)    DVT Prophylaxis: DOAC  Escobedo Catheter: PRESENT, indication: Other  (Comment);Pressure Ulcer with Incontinence (open wounds), Strict 1-2 Hour I&O  Central Lines: None  Cardiac Monitoring: None  Code Status: Full Code      Disposition Plan     Pending placement needs     The patient's care was discussed with the patient and bedside nurse    Jamie Varner MD  Hospitalist Service  [facility]  Securely message with the Vocera Web Console (learn more here)  Text page via [employer] Paging/Directory         Clinically Significant Risk Factors Present on Admission                      ______________________________________________________________________    Interval History   Overnight no acute events afebrile, no significant agitation, mental status improving, oral intake still remains poor, continues to be incontinent and his sacral wounds are soaked in urine after Escobedo removal yesterday.  The patient denies any pain shortness of breath, Escobedo catheter is comfortable, no nausea or vomiting, no other new complaints.    data reviewed today: I reviewed all medications, new labs and imaging results over the last 24 hours. I personally reviewed no images or EKG's today.    Physical Exam   Vital Signs: Temp: 97.6  F (36.4  C) Temp src: Tympanic BP: 131/72 Pulse: 92   Resp: 18 SpO2: 93 % O2 Device: None (Room air)    Weight: 203 lbs 11.28 oz    Exam:   GENERAL: Laying in bed, awakens to voice and light touch, tracking, following commands, in no apparent distress.  CARDIOVASCULAR: regular rate and rhythm, no murmurs, rubs, or gallops. Normal S1/S2. No lower extremity edema.   RESPIRATORY: clear to auscultation bilaterally, no wheezes, no crackles.   GI: soft, non-tender, non-distended, normoactive bowel sounds.  : Escobedo replaced with clear yellow urine.  MUSCULOSKELETAL: warm and well perfused, 2+ dorsalis pedis pulses bilaterally.    SKIN: Right IJ CVC in place removed.  Coccyx and buttocks with stage I epithelial disruption with area on the right buttock of eschar, no  significant surrounding erythema, no active drainage.  He will with stage I pressure ulcer with blister without surrounding erythema and no active drainage.      Data   Recent Labs   Lab 06/28/22  0704 06/28/22  0620 06/27/22  1406 06/27/22  0536 06/26/22  0455 06/25/22  0427 06/24/22  0510   WBC  --  9.2  --  12.3*  --   --  8.3   HGB  --  13.3  --  14.0  --   --  11.6*   MCV  --  103*  --  102*  --   --  106*   PLT  --  289  --  309  --   --  189   INR  --  1.13  --   --   --   --   --    NA  --  138  --  138 140  --  139   POTASSIUM  --  3.4* 4.6 3.3*  3.3* 3.5   < > 3.6   CHLORIDE  --  103  --  101 101  --  106   CO2  --  23  --  26 30  --  25   BUN  --  18  --  15 12  --  27*   CR  --  0.82  --  1.02 1.04  --  1.21   ANIONGAP  --  12  --  11 9  --  8   PAWAN  --  8.7  --  9.0 8.6  --  8.1*   GLC 86 84  --  90 92  --  129*   ALBUMIN  --   --   --  3.3* 3.1*  --  2.6*   PROTTOTAL  --   --   --  6.2* 6.3*  --  5.1*   BILITOTAL  --   --   --  1.1* 1.2*  --  0.6   ALKPHOS  --   --   --  42 40  --  27*   ALT  --   --   --  37 34  --  12   AST  --   --   --  38 44*  --  22    < > = values in this interval not displayed.     No results found for this or any previous visit (from the past 24 hour(s)).

## 2022-06-28 NOTE — PLAN OF CARE
Goal Outcome Evaluation:    Patient admitted with alcohol withdrawal. Last CIWA of 4 d/t clouding of orientation. Pt disoriented to time, situation, and occasionally place. Sleeping but arouses to voice. Speech slow, slurred, garbled. Inconsistent following commands. LS diminished to bases. Not tolerating PO intake, refuses to eat much or drink supplemental snacks. Incontinent of stool. Escobedo catheter patent. Electrolytes replaced. Dressings to coccyx/sacrum CDI. CCRQ2. Discontinued air mattress d/t bed alarm not sensitive enough. Hypertensive at times.

## 2022-06-28 NOTE — PROGRESS NOTES
06/28/22 1000   Signing Clinician's Name / Credentials   Signing clinician's name / credentials Obi Deja MPT   Quick Adds   Rehab Discipline PT   Quick Adds Interventions Mobility   Functional Transfer Training   Treatment Detail sit to stand with moderate assist of 2   Gait Training   Symptoms Noted During/After Treatment (Gait Training) fatigue   Distance in Feet (Required for LE Total Joints) patient unable to take any steps today   Treatment Detail standing with Fww x 15-20 seconds   [place] Level (Gait Training) moderate assist (50% patient effort)   Physical Assistance Level (Gait Training) 2 person assist   Impairments (Gait Analysis/Training) strength decreased   Therapeutic Activity   Treatment Detail maximal to moderate assist of 2 for supine<>sit; able to sit edge of bed with minimal assist to CGA   PT Discharge Planning   PT Discharge Recommendation (DC Rec) Transitional Care Facility   PT Rationale for DC Rec to promote patient's highest level of functional mobility   PT Brief overview of current status patient requiring maximal to moderate assist for bed mobilities; able to sit edge of bed with minimal assist to CGA; sit to stand with moderate assist of 2; able to stand with Fww and moderate to minimal assist of 2 and verbal cues to increase erect posture x 15-20 seconds; fair ability to follow one step commands   Additional Documentation   Rehab Comments patient will benefit from continued PT: PT assist with change of mattress   PT Plan Continue PT   Total Session Time   Total Session Time (minutes) 40 minutes

## 2022-06-28 NOTE — PROGRESS NOTES
Received report from Summer HIGGINS and assumed care of patient.Jaspreet Craft RN on 6/27/2022 at 11:57 PM

## 2022-06-28 NOTE — PROGRESS NOTES
:     Spoke on the phone with RONALDO Da Silva to discuss patients discharge planning needs. Rekha stated that she will have to contact legal to see if patient is to return to longterm or home at the time of discharge.      will continue to follow for discharge planning needs.     REECE Rodriguez on 6/28/2022 at 1:27 PM    :     Received voicemail from patients Judith, regarding discharge planning. Called back. No answer. Left voicemail.     REECE Rodriguez on 6/28/2022 at 3:53 PM    :     Spoke with patients , Judith. She stated that at this time patient will need to return to longterm at the time of discharge. Discuss possible short term rehab options. She stated she will talk with the  regarding this to see if this is an option.      will continue to follow for discharge planning needs.     REECE Rodriguez on 6/28/2022 at 4:16 PM

## 2022-06-28 NOTE — PLAN OF CARE
Goal Outcome Evaluation:    Plan of Care Reviewed With: patient        Patient is alert, has confusion, oriented to self/place, is bedbound.  Denied pain.  /107- hydralazine administered 117/62 now.  Dressing x2 to buttock changed- wound bed black, no drainage- surrounding tissue denuded, erythema/edema, blanchable and nonblanchable redness.  Lungs clear/dim, heart regular, bowel sounds active, abdomen rounded, trace edema/katie BLE, accessory muscle use.  Summer Santoyo RN on 6/27/2022 at 9:09 PM          Will benefit from air mattress- to pass on to next shift due to more staff required to initiate mattress change.  Summer Santoyo RN on 6/27/2022 at 9:09 PM     Has set off bed alarm multiple times tonight- redirected with urinal and conversation and positioning/pillows.  Door open and alarm on sensitive setting to promote safety.  Summer Santoyo RN on 6/28/2022 at 12:32 AM

## 2022-06-28 NOTE — PLAN OF CARE
"Patient is alert to self, with intermittent confusion. Response to simple questions is not always logical. Patient resistant to repositioning side-to-side. Dressing to sacrum changed with incontinent episodes. Patient has loose nonproductive cough. Urine is dark boubacar and has strong odor.  Problem: Plan of Care - These are the overarching goals to be used throughout the patient stay.    Goal: Plan of Care Review/Shift Note  Description: The Plan of Care Review/Shift note should be completed every shift.  The Outcome Evaluation is a brief statement about your assessment that the patient is improving, declining, or no change.  This information will be displayed automatically on your shift note.  Outcome: Ongoing, Not Progressing  Goal: Patient-Specific Goal (Individualized)  Description: You can add care plan individualizations to a care plan. Examples of Individualization might be:  \"Parent requests to be called daily at 9am for status\", \"I have a hard time hearing out of my right ear\", or \"Do not touch me to wake me up as it startles me\".  Outcome: Ongoing, Not Progressing  Goal: Absence of Hospital-Acquired Illness or Injury  Outcome: Ongoing, Not Progressing  Intervention: Identify and Manage Fall Risk  Recent Flowsheet Documentation  Taken 6/28/2022 0303 by Jaspreet Craft, RN  Safety Promotion/Fall Prevention:   activity supervised   assistive device/personal items within reach   bed alarm on   nonskid shoes/slippers when out of bed   safety round/check completed  Taken 6/28/2022 0254 by Jaspreet Craft, RN  Safety Promotion/Fall Prevention: safety round/check completed  Taken 6/28/2022 0200 by Jaspreet Craft, RN  Safety Promotion/Fall Prevention: safety round/check completed  Taken 6/28/2022 0100 by Jaspreet Craft, RN  Safety Promotion/Fall Prevention: safety round/check completed  Taken 6/28/2022 0000 by Jaspreet Craft RN  Safety Promotion/Fall Prevention: safety round/check " completed  Intervention: Prevent Skin Injury  Recent Flowsheet Documentation  Taken 6/28/2022 0303 by Jaspreet Craft RN  Body Position:   turned   right  Taken 6/28/2022 0100 by Jaspreet Craft RN  Body Position:   turned   left   side-lying  Intervention: Prevent and Manage VTE (Venous Thromboembolism) Risk  Recent Flowsheet Documentation  Taken 6/28/2022 0100 by Jaspreet Craft RN  Activity Management: activity adjusted per tolerance  Intervention: Prevent Infection  Recent Flowsheet Documentation  Taken 6/28/2022 0303 by Jaspreet Craft RN  Infection Prevention:   rest/sleep promoted   hand hygiene promoted  Goal: Optimal Comfort and Wellbeing  Outcome: Ongoing, Not Progressing  Goal: Readiness for Transition of Care  Outcome: Ongoing, Not Progressing     Problem: Fall Injury Risk  Goal: Absence of Fall and Fall-Related Injury  Outcome: Ongoing, Not Progressing  Intervention: Identify and Manage Contributors  Recent Flowsheet Documentation  Taken 6/28/2022 0303 by Jaspreet Craft RN  Medication Review/Management: medications reviewed  Intervention: Promote Injury-Free Environment  Recent Flowsheet Documentation  Taken 6/28/2022 0303 by Jaspreet Craft RN  Safety Promotion/Fall Prevention:   activity supervised   assistive device/personal items within reach   bed alarm on   nonskid shoes/slippers when out of bed   safety round/check completed  Taken 6/28/2022 0254 by Jaspreet Craft RN  Safety Promotion/Fall Prevention: safety round/check completed  Taken 6/28/2022 0200 by Jaspreet Craft RN  Safety Promotion/Fall Prevention: safety round/check completed  Taken 6/28/2022 0100 by Jaspreet Craft RN  Safety Promotion/Fall Prevention: safety round/check completed  Taken 6/28/2022 0000 by Jaspreet Craft RN  Safety Promotion/Fall Prevention: safety round/check completed     Problem: Behavioral Health Comorbidity  Goal: Maintenance of Behavioral Health Symptom Control  Outcome: Ongoing,  Not Progressing  Intervention: Maintain Behavioral Health Symptom Control  Recent Flowsheet Documentation  Taken 6/28/2022 0303 by Jaspreet Craft, RN  Medication Review/Management: medications reviewed     Problem: Alcohol Withdrawal  Goal: Alcohol Withdrawal Symptom Control  Outcome: Ongoing, Not Progressing     Problem: Oral Intake Inadequate  Goal: Optimal Oral Intake  Outcome: Ongoing, Not Progressing   Goal Outcome Evaluation:

## 2022-06-28 NOTE — PLAN OF CARE
"Patient is lethargic but arouses to voice easily, orientated to self and sometimes place. Calls out in pain with nursing cares but when asked what hurts he states \"I don't know.\" Often becomes irritable with nursing cares, no prn required thus far, has been redirectable. Speech remains slow, slurred, and garbled. Lungs dim throughout, infrequent cough, shallow breaths, denies SOB and doesn't understand \"deep breathing\". Heart RRR, denies chest pain. Trace edema to BLE, legs elevated and feels floated for right heel closed serous blister. Fecal incontinence, CCRq2h. Escobedo inserted for urinary incontinence with open wounds. Several wounds, see below. All wounds cleansed and dressings or powder applied. Received bed bath and shower cap today. PT/OT were going to attempt sitting at edge of bed today. Placed on air mattress this shift. Continue to educate on the fact he has to eat, but continues to refuse food or protein drinks. VSS. Will continue to monitor.      06/28/22 0937   Wound Buttocks Pressure injury suspected hospital acquired Unstagable   Date First Assessed/Time First Assessed: 06/28/22 0946   Orientation: Right  Location: Buttocks  Wound Type: Pressure injury suspected hospital acquired  Wound Description (Comments): 6x2cm unstageable pressure injury due to eschar  Staging (complete ...   Wound Bed Eschar;Red;Pink   Jazz-wound Assessment Erythema;Edema   Wound Length (cm) 6 cm   Wound Width (cm) 2 cm   Wound Surface Area (cm^2) 12 cm^2   Drainage Amount Scant   Drainage Color/Characteristics Serosanguineous   Wound Care/Cleansing Wound cleanser   Dressing Foam   Dressing Status Clean, dry, intact;Changed      06/28/22 0937   Wound Coccyx Pressure injury suspected hospital acquired Stage 1   Date First Assessed/Time First Assessed: 06/28/22 0941   Location: Coccyx  Wound Type: Pressure injury suspected hospital acquired  Wound Description (Comments): 4x1cm stage 1 with some eschar  Pre-existing: Yes  Staging " (complete if a pressure injury...   Wound Bed Pink;Red;Eschar   Jazz-wound Assessment Edema;Erythema   Wound Length (cm) 4 cm   Wound Width (cm) 1 cm   Wound Surface Area (cm^2) 4 cm^2   Drainage Amount Scant   Drainage Color/Characteristics Serosanguineous   Wound Care/Cleansing Wound cleanser   Dressing Foam   Dressing Status Clean, dry, intact;Changed      06/28/22 0937   Wound Thigh   Date First Assessed/Time First Assessed: 06/27/22 1135   Orientation: Anterior;Right  Location: Thigh   Wound Bed Pink;Moist;Granulation tissue   Jazz-wound Assessment Moist;Bleeding   Wound Length (cm) 4 cm   Wound Width (cm) 1.5 cm   Wound Surface Area (cm^2) 6 cm^2   Drainage Amount Scant   Drainage Color/Characteristics Serosanguineous   Wound Care/Cleansing Wound cleanser   Dressing Foam   Dressing Status Clean, dry, intact;Changed      06/28/22 0937   Wound Groin Moisture damage NA   Date First Assessed/Time First Assessed: 06/28/22 0942   Orientation: Bilateral  Location: Groin  Wound Type: Moisture damage  Wound Description (Comments): Blanchable redness rash  Pre-existing: Yes  Staging (complete if a pressure injury): NA   Wound Bed Pink;Red   Jazz-wound Assessment Erythema   Estimated Circumference ( if not measured grapefruit size   Drainage Amount None   Wound Care/Cleansing Wound cleanser   Dressing Other (Comment);Open to air  (powder)      06/28/22 0937   Wound Heel Other (comment) Unstagable   Date First Assessed/Time First Assessed: 06/24/22 1500   Orientation: Right;Lateral  Location: Heel  Wound Type: (c) Other (comment)  Pre-existing: No  Staging (complete if a pressure injury): Unstagable   Wound Bed Blister-Serous   Jazz-wound Assessment Erythema;Edema   Estimated Circumference ( if not measured golf sized   Drainage Amount None   Dressing Open to air       Problem: Fall Injury Risk  Goal: Absence of Fall and Fall-Related Injury  Outcome: Ongoing, Not Progressing     Problem: Behavioral Health  Comorbidity  Goal: Maintenance of Behavioral Health Symptom Control  Outcome: Ongoing, Not Progressing     Problem: Alcohol Withdrawal  Goal: Alcohol Withdrawal Symptom Control  Outcome: Ongoing, Progressing     Problem: Oral Intake Inadequate  Goal: Optimal Oral Intake  Outcome: Ongoing, Not Progressing   Goal Outcome Evaluation:

## 2022-06-28 NOTE — PROGRESS NOTES
06/28/22 1600   Signing Clinician's Name / Credentials   Signing clinician's name / credentials Rosenda Gonzalez OTR/L   Quick Adds   Rehab Discipline OT   Functional Transfer Training   Symptoms Noted During/After Treatment fatigue   Treatment Detail max A of 2 for supine to sit at EOB, min assist to maintain sitting balance, pt stood x 3 at EOB with walker, unable to tolerate standing longer than 15-20 seconds. Pt was able to state he knew he was in the hospital but was very lethargic.   Grooming Training   [place] Level (Grooming Training) maximum assist (25% patient effort)   Assistance (Grooming Training) 1 person assist   OT Discharge Planning   OT Discharge Recommendation (DC Rec)   (uncertain at this time)   OT Rationale for DC Rec Pt will benefit from continued therapy to maximize level of independence needed for safe disposition   OT Brief overview of current status Pt continues to be lethargic with a low level of arousal, but able to answer simple questions, max assist of 2 to sit at EOB, stood x 3 with min to mod assist but had a 15-20 second standing tolerance   Additional Documentation   OT Plan cont OT   Total Session Time   Total Session Time (minutes) 30 minutes

## 2022-06-28 NOTE — PROGRESS NOTES
06/27/22 1448   Signing Clinician's Name / Credentials   Signing clinician's name / credentials Rosenda Gonzalez OTR/L   Quick Adds   Rehab Discipline OT   Functional Transfer Training   Treatment Detail max assist of 2 for supine to sit at EOB and assist to maintain static sitting balance   Therapeutic Activity   Symptoms Noted During/After Treatment Fatigue   Treatment Detail pt sat at EOB with mod to max assist at times. moved sit to stand with mod assist of 1-2, unable to stand for long, knees buckling.   ADL Training   Treatment Detail washed pt's hair with shampoo cap while he was sitting upight at EOB   Grooming Training   [place] Level (Grooming Training) maximum assist (25% patient effort)   Assistance (Grooming Training) 1 person assist   Treatment Detail to brush snarls out of hair   OT: Cognitive   Symptoms Noted During/After Treatment   (fatigue, lethargic low arousal)   OT Discharge Planning   OT Discharge Recommendation (DC Rec) Transitional Care Facility   OT Rationale for DC Rec Pt needs ongoing skilled therapy to maximize level of independence needed for D/C   OT Brief overview of current status Pt agreeable to attempt EOB sitting, max assist os 2 supine to sit and min assist of 2 to stand with FWW, unable to maintain standing for greater than 20 seconds   Additional Documentation   OT Plan cont OT   Total Session Time   Total Session Time (minutes) 30 minutes

## 2022-06-29 ENCOUNTER — APPOINTMENT (OUTPATIENT)
Dept: SPEECH THERAPY | Facility: OTHER | Age: 72
DRG: 673 | End: 2022-06-29
Payer: MEDICARE

## 2022-06-29 ENCOUNTER — APPOINTMENT (OUTPATIENT)
Dept: PHYSICAL THERAPY | Facility: OTHER | Age: 72
DRG: 673 | End: 2022-06-29
Payer: MEDICARE

## 2022-06-29 ENCOUNTER — APPOINTMENT (OUTPATIENT)
Dept: OCCUPATIONAL THERAPY | Facility: OTHER | Age: 72
DRG: 673 | End: 2022-06-29
Payer: MEDICARE

## 2022-06-29 LAB
GLUCOSE BLDC GLUCOMTR-MCNC: 103 MG/DL (ref 70–99)
GLUCOSE BLDC GLUCOMTR-MCNC: 109 MG/DL (ref 70–99)
GLUCOSE BLDC GLUCOMTR-MCNC: 76 MG/DL (ref 70–99)
GLUCOSE BLDC GLUCOMTR-MCNC: 79 MG/DL (ref 70–99)
PHOSPHATE SERPL-MCNC: 3.5 MG/DL (ref 2.5–5)
POTASSIUM BLD-SCNC: 3.7 MMOL/L (ref 3.5–5.1)

## 2022-06-29 PROCEDURE — 250N000013 HC RX MED GY IP 250 OP 250 PS 637: Performed by: FAMILY MEDICINE

## 2022-06-29 PROCEDURE — 84100 ASSAY OF PHOSPHORUS: CPT | Performed by: FAMILY MEDICINE

## 2022-06-29 PROCEDURE — 92526 ORAL FUNCTION THERAPY: CPT | Mod: GN

## 2022-06-29 PROCEDURE — 36415 COLL VENOUS BLD VENIPUNCTURE: CPT | Performed by: FAMILY MEDICINE

## 2022-06-29 PROCEDURE — 120N000001 HC R&B MED SURG/OB

## 2022-06-29 PROCEDURE — 97530 THERAPEUTIC ACTIVITIES: CPT | Mod: GP

## 2022-06-29 PROCEDURE — 250N000013 HC RX MED GY IP 250 OP 250 PS 637: Performed by: INTERNAL MEDICINE

## 2022-06-29 PROCEDURE — 99232 SBSQ HOSP IP/OBS MODERATE 35: CPT | Performed by: FAMILY MEDICINE

## 2022-06-29 PROCEDURE — 84132 ASSAY OF SERUM POTASSIUM: CPT | Performed by: FAMILY MEDICINE

## 2022-06-29 PROCEDURE — 97116 GAIT TRAINING THERAPY: CPT | Mod: GP

## 2022-06-29 PROCEDURE — 97530 THERAPEUTIC ACTIVITIES: CPT | Mod: GO | Performed by: OCCUPATIONAL THERAPIST

## 2022-06-29 RX ORDER — QUETIAPINE FUMARATE 25 MG/1
100 TABLET, FILM COATED ORAL AT BEDTIME
Status: DISCONTINUED | OUTPATIENT
Start: 2022-06-29 | End: 2022-07-12 | Stop reason: HOSPADM

## 2022-06-29 RX ORDER — FOLIC ACID 1 MG/1
1 TABLET ORAL DAILY
Status: DISCONTINUED | OUTPATIENT
Start: 2022-06-29 | End: 2022-07-10

## 2022-06-29 RX ORDER — MULTIPLE VITAMINS W/ MINERALS TAB 9MG-400MCG
1 TAB ORAL DAILY
Status: DISCONTINUED | OUTPATIENT
Start: 2022-06-29 | End: 2022-07-10

## 2022-06-29 RX ADMIN — QUETIAPINE FUMARATE 12.5 MG: 25 TABLET ORAL at 13:57

## 2022-06-29 RX ADMIN — RIVAROXABAN 15 MG: 15 TABLET, FILM COATED ORAL at 08:19

## 2022-06-29 RX ADMIN — QUETIAPINE FUMARATE 100 MG: 100 TABLET ORAL at 21:27

## 2022-06-29 RX ADMIN — CLONIDINE HYDROCHLORIDE 0.1 MG: 0.1 TABLET ORAL at 19:51

## 2022-06-29 RX ADMIN — QUETIAPINE FUMARATE 12.5 MG: 25 TABLET ORAL at 08:26

## 2022-06-29 RX ADMIN — NICOTINE 1 PATCH: 14 PATCH, EXTENDED RELEASE TRANSDERMAL at 09:08

## 2022-06-29 RX ADMIN — LISINOPRIL 20 MG: 20 TABLET ORAL at 09:08

## 2022-06-29 RX ADMIN — FAMOTIDINE 20 MG: 20 TABLET ORAL at 21:27

## 2022-06-29 RX ADMIN — THIAMINE HCL TAB 100 MG 300 MG: 100 TAB at 09:08

## 2022-06-29 RX ADMIN — FOLIC ACID 1 MG: 1 TABLET ORAL at 13:39

## 2022-06-29 RX ADMIN — RIVAROXABAN 15 MG: 15 TABLET, FILM COATED ORAL at 18:24

## 2022-06-29 RX ADMIN — Medication 1 TABLET: at 13:39

## 2022-06-29 RX ADMIN — FAMOTIDINE 20 MG: 20 TABLET ORAL at 09:08

## 2022-06-29 RX ADMIN — ACETAMINOPHEN 1000 MG: 500 TABLET ORAL at 19:51

## 2022-06-29 ASSESSMENT — ACTIVITIES OF DAILY LIVING (ADL)
ADLS_ACUITY_SCORE: 48
ADLS_ACUITY_SCORE: 50
ADLS_ACUITY_SCORE: 50
ADLS_ACUITY_SCORE: 48
ADLS_ACUITY_SCORE: 50
ADLS_ACUITY_SCORE: 48
ADLS_ACUITY_SCORE: 50
ADLS_ACUITY_SCORE: 48
ADLS_ACUITY_SCORE: 50

## 2022-06-29 NOTE — PROGRESS NOTES
"Patient alert and oriented to self only. Patient up to chair with x2 assist, gait belt and walker, PT present. Patient frustrated when asked questions of how he is feeling.  CCQR2, pillows in use. Escobedo patent, boubacar urine. LS diminished. BS audible. Foods increased to thin liquids, encouragement needed, and help eating at this time. Will continue to monitor.    Blood pressure (!) 143/92, pulse 92, temperature 97  F (36.1  C), temperature source Tympanic, resp. rate 18, height 1.778 m (5' 10\"), weight 91.5 kg (201 lb 11.5 oz), SpO2 96 %.  Nia Dominguez RN on 6/29/2022 at 1:20 PM      Seroquel given PRN for agitation. Patient reported needing to get out of bed to go and have a cup of coffee, offered cup of coffee and patient declined. Patient declined offered meals and beverages. Nicotine patch Rt. Shoulder.   Nia Dominguez RN on 6/29/2022 at 2:12 PM      Patient continued repositioning. Heels elevated with pillows, weight shifting continued to promote healing.  Dressings on buttocks, coccyx, groin are CDI.   Nia Dominguez RN on 6/29/2022 at 4:59 PM    Patient Dressings changed. Patient repositioned, heels up. Weight shifting to right side, using pillows. Offered meals throughout the day, patient refused each time offered.     "

## 2022-06-29 NOTE — PLAN OF CARE
Goal Outcome Evaluation:  Problem: Plan of Care - These are the overarching goals to be used throughout the patient stay.    Goal: Absence of Hospital-Acquired Illness or Injury  Outcome: Ongoing, Progressing  Intervention: Identify and Manage Fall Risk  Recent Flowsheet Documentation  Taken 6/29/2022 0820 by Nia Dominguez, RN  Safety Promotion/Fall Prevention: safety round/check completed  Intervention: Prevent Skin Injury  Recent Flowsheet Documentation  Taken 6/29/2022 1248 by Nia Dominguez, RN  Body Position:   right   weight shifting  Taken 6/29/2022 1000 by Nia Dominguez, RN  Body Position: (feet elevated on pillows.)   legs elevated   other (see comments)  Intervention: Prevent and Manage VTE (Venous Thromboembolism) Risk  Recent Flowsheet Documentation  Taken 6/29/2022 0820 by Nia Dominguez, RN  VTE Prevention/Management: (xeralto) other (see comments)  Activity Management: activity adjusted per tolerance       Plan of Care Reviewed With: patient     Overall Patient Progress: no change

## 2022-06-29 NOTE — PROGRESS NOTES
Patient ok with being assisted with mashed potatoes, jello and boost.  However, inappropriate comment made to this nurse, pt had asked if he could rub this writer's butt. This writer said no, that is inappropriate and then he proceeded to ask 'if this is a part of the program'. Appropriate boundaries reinforced.   Nia Dominguez RN on 6/29/2022 at 6:45 PM

## 2022-06-29 NOTE — PROGRESS NOTES
"   06/29/22 1400   Signing Clinician's Name / Credentials   Signing clinician's name / credentials Rupa Hess CCC-SLP   Quick Adds   Rehab Discipline SLP   Quick Adds Interventions Speech Language Pathology   SLP - Dysphagia/Swallow   Minutes of Treatment 20 minutes   Symptoms Noted During/After Treatment None   Treatment Detail Pt participating in PO trials of thin liquids, puree, minced and moist and soft and bite sized textures. Pt was sleeping upon therapist arrival, awoken and boosted upright in bed prior to PO trials. Pt showing no overt s/s of aspiration/penetration across trials. He required 2x cue to close lips around straw for thin liquids. He ate 1 diced peach, where he demonstrated prolonged mastication time, due to significant amount of missing dentition. Recommend IDDSI 0 and 5 due to lack of dentition resulting in prolonged mastication and fatigue. Pt attempting to get out of bed and was unable to be redirected, so PO trials were stopped. Pt's room updated and RN notified of assessment results. Suggested to RN ordering him food and feeding it to him as he will reply \"no\" when asked if he wants any, however he will eat when presented food or drink.   SLP Discharge Planning   SLP Discharge Recommendation   (Uncertain at this time.)   SLP Rationale for DC Rec Pt has altered mental status and poor PO intake, and requires a modified diet at this time.   SLP Brief overview of current status  Pt showing no overt s.s of aspiraion/penetration throughout trials today, however recommend IDDSI 5 Minced and moist due to lack of dentition resulting in prolonged mastication and fatigue during PO intake. Recommend IDDSI 0 Thin and IDDSI 5 minced and moist. ST to follow for duration of stay.   Additional Documentation   SLP Plan Cont ST   Total Session Time   Total Session Time (minutes) 20 minutes     "

## 2022-06-29 NOTE — PROGRESS NOTES
"Pt is disorientated to time and situation. Agitated, anxious. Continuously is attempting to get out of bed. Staff intervene, Repo. Pt not cooperative. Speech is slow, garbled and slurred. Gave 50 mg Seroquel PO at 2051, little results relieving agitation and anxiety.     BP (!) 160/76 (BP Location: Left arm, Patient Position: Semi-Fitzgerald's, Cuff Size: Adult Regular)   Pulse 92   Temp 97.4  F (36.3  C) (Tympanic)   Resp 20   Ht 1.778 m (5' 10\")   Wt 92.4 kg (203 lb 11.3 oz)   SpO2 97%   BMI 29.23 kg/m        Venice Workman RN on 6/28/2022 at 10:34 PM    Pt continues to be disorientated. Pt anxious and agitated. Irritable with cares. Speech is garbled, and slow. Insisting he is \"getting up in 10 more minutes\". Pt repetitively stating he is \"going to go to the liquor store before they close, I have to hurry and get there.\"  Staff unable to redirect or distract. Additional 50 mg Seroquel PO given ok per MD. Pt is less anxious and restless. No longer is agitated at this time. Will continue to monitor.    Venice Workman RN on 6/28/2022 at 11:46 PM      Pt is more calm and relaxed. Cooperative with cares. Denies pain or discomfort. Resting comfortably. Warm blankets applied, pt felt cold. VSS, continuing to monitor.     BP (!) 150/71 (BP Location: Left arm, Patient Position: Semi-Fitzgerald's, Cuff Size: Adult Regular)   Pulse 91   Temp (!) 96.1  F (35.6  C) (Tympanic)   Resp 18   Ht 1.778 m (5' 10\")   Wt 91.5 kg (201 lb 11.5 oz)   SpO2 96%   BMI 28.94 kg/m       Venice Workman RN on 6/29/2022 at 4:12 AM      "

## 2022-06-29 NOTE — PROGRESS NOTES
[facility]    Medicine Progress Note - Hospitalist Service    Date of Admission:  6/17/2022    Assessment & Plan             Antonio Rutherford is a 72 year old male admitted on 6/17/2022. He presented from the long-term for concerns of possible kidney stone versus sepsis.     Patient was admitted to the long-term 7 days PTA.  He is a daily drinker of whiskey and was treated for alcohol withdrawal at the long-term and then started on Bactrim for possible UTI 4 days PTA.  He had extremely poor oral intake PTA (20 oz in 7d per long-term nurse report).     Patient was admitted treated with thiamine, folate and IV fluids, strict I's and O's and close monitoring.  Developed worsening confusion and somnolence and concern to protect his airway.  Got intubated on 6/21/2022, extubated 6/24/2022.       Alcohol dependence with withdrawal delirium (H):   Assessment: Resolved.  Sedated and intubated in the ICU on 6/21/22 for somnolence, extubated on 6/24/2022. Ammonia level was normal, ABG did not show significant CO2 retention, head CT and brain MRI neg for acute findings. Repeat CT of the head negative.    -Discontinue CIWA on 6/27  -Minimize sedating medication  -Appreciate social work input for chemical dependency needs when mental status clear     Encephalopathy, metabolic.  Significantly improved and oriented to person and hospital today, guesses a year.  CT and MRI head/brain negative for acute findings. Likely due to ICU acquired delirium at this time.  Cannot exclude encephalopathy , he should be out of the timeframe for withdrawal.    Plan:   -Delirium order set  -Appreciate PT/OT input for dispo needs     Stage 1 pressure ulcer of buttocks with area of unstageable eschar  Heel pressure ulcer stage 1. Left foot.  Present on admission  -offload   -Air mattress  -Continue Escobedo to minimize skin breakdown     Acute PE, POA: Diagnosed 6/17, likely provoked from immobility and acute illness.  -Initially started on lovenox  1.5mg/kg/d now able to take oral medication  -Continue Xarelto for PE treatment     Aortic valve echodensity: Probable chronic calcific nodule, could also represent small clot, unlikely to represent infective endocarditis process with initial negative blood cultures.  No evidence of CVA given MRI findings and at this point given how critically ill he has been will anticoagulate as above, monitor for clinical improvement and consider repeat ELLEN as an outpatient to ensure stability/resolution.  -Follow-up repeat blood cultures     CHARLOTTE due to ATN (acute tubular necrosis) (H): Resolved, creatinine peaked at 2.7, likely from prerenal component from hypovolemia and infection and possible ATN from Bactrim/ibuprofen prior to admission.    Plan:  -Monitor daily   -avoid NSAIDs and nephrotoxins     Community-acquired versus aspiration pneumonia: Possibly contributing to need for intubation with possible component of aspiration given alcohol abuse  -Completed 7-day course of Zosyn     UTI. POA: Resolved.  Empirically treated with Bactrim, Urine culture negative.  No growth in urine culture. Blood cultures negative.      Essential hypertension: Stable.  -Continue as needed hydralazine and-labetalol  -Continue home ACE  -Discontinue metoprolol given resting bradycardia and 60     Severe protein calorie malnutrition: Generally poor oral intake  -Inpatient dietary eval      Sinus tachycardia: Resolved, likely from withdrawal.  Got amiodarone x24 hours. Now HR regular and controlled.    Plan:   -Discontinue telemetry      diarrhea: Resolved  -C. difficile and stool culture negative.    -Imodium as needed     Smoker.  No wheezing or contribution of COPD exacerbation during his stay      Hypokalemia  Likely from poor oral intake.  Improved.  Replace per protocol-              Diet: Soft & Bite Sized Diet (level 6) Liquidized/Moderately Thick (level 3)    DVT Prophylaxis: DOAC  Escobedo Catheter: PRESENT, indication: Other (Comment),  "Strict 1-2 Hour I&O  Central Lines: None  Cardiac Monitoring: None  Code Status: Full Code      Disposition Plan    Likely back to snf when able to do requisite ADL's.     The patient's care was discussed with the Patient, LMTCB with son Jorge.  Will discuss with his son when able.    Karl Menjivar MD  Hospitalist Service  [facility]  Securely message with the Vocera Web Console (learn more here)  Text page via [employer] Paging/Directory         Clinically Significant Risk Factors Present on Admission                      ______________________________________________________________________    Interval History   Patient awakens easily.  Tells me \"of course I do\" when asked if he knows where he is and why he is here.  When pressed correctly says he is in hospital but thinks he is here because \"they accused me of messing around with a kid\".  He denies any chest pain or shortness of breath.  Does not remember the past week or more.  Overnight had some delirium that required zyprexa.  Appetite still poor.  Escobedo functioning.  NO diarrhea.  No abdominal pain or pain elsewhere.    Data reviewed today: I reviewed all medications, new labs and imaging results over the last 24 hours. I personally reviewed no images or EKG's today.    Physical Exam   Vital Signs: Temp: 97  F (36.1  C) Temp src: Tympanic BP: (!) 143/92 Pulse: 92   Resp: 18 SpO2: 96 % O2 Device: None (Room air)    Weight: 201 lbs 11.53 oz  Constitutional: awake, alert, cooperative, no apparent distress, and appears stated age  Respiratory: No increased work of breathing, good air exchange, clear to auscultation bilaterally, no crackles or wheezing  Cardiovascular: Regularrate and rhythm.  No murmurs rubs or gallops heard.   GI: Abdomen Soft, non-tender.  No masses, rebound or guarding.   Neuropsychiatric: Oriented to person and place but not time or situation.    Data   Recent Labs   Lab 06/29/22  0800 06/29/22  0630 06/29/22  0215 " 06/28/22  2048 06/28/22  1633 06/28/22  1304 06/28/22  0704 06/28/22  0620 06/27/22  1406 06/27/22  0536 06/26/22  0455 06/25/22  0427 06/24/22  0510   WBC  --   --   --   --   --   --   --  9.2  --  12.3*  --   --  8.3   HGB  --   --   --   --   --   --   --  13.3  --  14.0  --   --  11.6*   MCV  --   --   --   --   --   --   --  103*  --  102*  --   --  106*   PLT  --   --   --   --   --   --   --  289  --  309  --   --  189   INR  --   --   --   --   --   --   --  1.13  --   --   --   --   --    NA  --   --   --   --   --   --   --  138  --  138 140  --  139   POTASSIUM  --  3.7  --   --   --  3.9  --  3.4*   < > 3.3*  3.3* 3.5   < > 3.6   CHLORIDE  --   --   --   --   --   --   --  103  --  101 101  --  106   CO2  --   --   --   --   --   --   --  23  --  26 30  --  25   BUN  --   --   --   --   --   --   --  18  --  15 12  --  27*   CR  --   --   --   --   --   --   --  0.82  --  1.02 1.04  --  1.21   ANIONGAP  --   --   --   --   --   --   --  12  --  11 9  --  8   PAWAN  --   --   --   --   --   --   --  8.7  --  9.0 8.6  --  8.1*   GLC 79  --  76 85   < >  --    < > 84  --  90 92  --  129*   ALBUMIN  --   --   --   --   --   --   --   --   --  3.3* 3.1*  --  2.6*   PROTTOTAL  --   --   --   --   --   --   --   --   --  6.2* 6.3*  --  5.1*   BILITOTAL  --   --   --   --   --   --   --   --   --  1.1* 1.2*  --  0.6   ALKPHOS  --   --   --   --   --   --   --   --   --  42 40  --  27*   ALT  --   --   --   --   --   --   --   --   --  37 34  --  12   AST  --   --   --   --   --   --   --   --   --  38 44*  --  22    < > = values in this interval not displayed.     No results found for this or any previous visit (from the past 24 hour(s)).

## 2022-06-29 NOTE — PLAN OF CARE
"Goal Outcome Evaluation:    Plan of Care Reviewed With: patient     Overall Patient Progress: no change      Problem: Plan of Care - These are the overarching goals to be used throughout the patient stay.    Goal: Plan of Care Review/Shift Note  Description: The Plan of Care Review/Shift note should be completed every shift.  The Outcome Evaluation is a brief statement about your assessment that the patient is improving, declining, or no change.  This information will be displayed automatically on your shift note.  Outcome: Ongoing, Progressing  Flowsheets (Taken 6/29/2022 0412)  Plan of Care Reviewed With: patient  Overall Patient Progress: no change  Goal: Patient-Specific Goal (Individualized)  Description: You can add care plan individualizations to a care plan. Examples of Individualization might be:  \"Parent requests to be called daily at 9am for status\", \"I have a hard time hearing out of my right ear\", or \"Do not touch me to wake me up as it startles me\".  Outcome: Ongoing, Progressing  Goal: Absence of Hospital-Acquired Illness or Injury  Outcome: Ongoing, Progressing  Intervention: Identify and Manage Fall Risk  Recent Flowsheet Documentation  Taken 6/29/2022 0407 by Venice Workman, RN  Safety Promotion/Fall Prevention:   activity supervised   bed alarm on   clutter free environment maintained   fall prevention program maintained   increased rounding and observation   increase visualization of patient   lighting adjusted   nonskid shoes/slippers when out of bed   patient and family education   room door open   room near nurse's station   room organization consistent   safety round/check completed   supervised activity   treat reversible contributory factors   treat underlying cause  Taken 6/28/2022 1923 by Venice Workman, RN  Safety Promotion/Fall Prevention:   activity supervised   bed alarm on   clutter free environment maintained   fall prevention program maintained   increased rounding and " observation   increase visualization of patient   lighting adjusted   nonskid shoes/slippers when out of bed   patient and family education   room door open   room near nurse's station   room organization consistent   safety round/check completed   supervised activity   treat reversible contributory factors   treat underlying cause  Intervention: Prevent and Manage VTE (Venous Thromboembolism) Risk  Recent Flowsheet Documentation  Taken 6/29/2022 0407 by Venice Workman RN  VTE Prevention/Management:   patient refused intervention   SCDs (sequential compression devices) off  Taken 6/28/2022 1923 by Venice Workman RN  VTE Prevention/Management:   patient refused intervention   SCDs (sequential compression devices) off  Intervention: Prevent Infection  Recent Flowsheet Documentation  Taken 6/29/2022 0407 by Venice Workman RN  Infection Prevention:   visitors restricted/screened   single patient room provided   rest/sleep promoted   hand hygiene promoted  Taken 6/28/2022 1923 by Venice Workman RN  Infection Prevention:   visitors restricted/screened   single patient room provided   rest/sleep promoted   hand hygiene promoted  Goal: Optimal Comfort and Wellbeing  Outcome: Ongoing, Progressing  Intervention: Provide Person-Centered Care  Recent Flowsheet Documentation  Taken 6/29/2022 0407 by Venice Workman RN  Trust Relationship/Rapport:   care explained   choices provided   emotional support provided   empathic listening provided   questions answered   questions encouraged  Taken 6/28/2022 1923 by Venice Workman RN  Trust Relationship/Rapport:   care explained   choices provided   emotional support provided   empathic listening provided   questions answered   questions encouraged  Goal: Readiness for Transition of Care  Outcome: Ongoing, Progressing     Problem: Fall Injury Risk  Goal: Absence of Fall and Fall-Related Injury  Outcome: Ongoing, Progressing  Intervention: Identify and Manage  Contributors  Recent Flowsheet Documentation  Taken 6/29/2022 0407 by Venice Workman RN  Medication Review/Management: medications reviewed  Taken 6/28/2022 1923 by Venice Workman RN  Medication Review/Management: medications reviewed  Intervention: Promote Injury-Free Environment  Recent Flowsheet Documentation  Taken 6/29/2022 0407 by Venice Workman RN  Safety Promotion/Fall Prevention:   activity supervised   bed alarm on   clutter free environment maintained   fall prevention program maintained   increased rounding and observation   increase visualization of patient   lighting adjusted   nonskid shoes/slippers when out of bed   patient and family education   room door open   room near nurse's station   room organization consistent   safety round/check completed   supervised activity   treat reversible contributory factors   treat underlying cause  Taken 6/28/2022 1923 by Venice Workman RN  Safety Promotion/Fall Prevention:   activity supervised   bed alarm on   clutter free environment maintained   fall prevention program maintained   increased rounding and observation   increase visualization of patient   lighting adjusted   nonskid shoes/slippers when out of bed   patient and family education   room door open   room near nurse's station   room organization consistent   safety round/check completed   supervised activity   treat reversible contributory factors   treat underlying cause     Problem: Behavioral Health Comorbidity  Goal: Maintenance of Behavioral Health Symptom Control  Outcome: Ongoing, Progressing  Intervention: Maintain Behavioral Health Symptom Control  Recent Flowsheet Documentation  Taken 6/29/2022 0407 by Venice Workman RN  Medication Review/Management: medications reviewed  Taken 6/28/2022 1923 by Venice Workman RN  Medication Review/Management: medications reviewed     Problem: Alcohol Withdrawal  Goal: Alcohol Withdrawal Symptom Control  Outcome: Ongoing, Progressing      Problem: Oral Intake Inadequate  Goal: Optimal Oral Intake  Outcome: Ongoing, Progressing

## 2022-06-29 NOTE — PROGRESS NOTES
SAFETY CHECKLIST  ID Bands and Risk clasps correct and in place (DNR, Fall risk, Allergy, Latex, Limb):  Yes  All Lines Reconciled and labeled correctly: Yes  Whiteboard updated:Yes  Environmental interventions (bed/chair alarm on, call light, side rails, restraints, sitter....): Yes  Verify Tele #: NA  Nia Dominguez RN on 6/29/2022 at 7:15 AM

## 2022-06-29 NOTE — PROGRESS NOTES
:     Patient comes from [facility]. He is to return to senior care at the time of discharge. Possible option for SNF placement for short term rehabilitation if approved. Still waiting to hear back from patients  regarding this.      will continue to follow for discharge planning needs.     REECE Rodriguez on 6/29/2022 at 2:29 PM

## 2022-06-29 NOTE — PROGRESS NOTES
06/29/22 1512   Signing Clinician's Name / Credentials   Signing clinician's name / credentials CARLOS Dunbar/Rosenda Gonzalez OTR/KIEL Richard   Rehab Discipline OT   Functional Transfer Training   Symptoms Noted During/After Treatment fatigue   Treatment Detail max A of 2 for supine to sit at EOB. CGA/Nathaniel for maintaining static sitting balance. pt completed stand pivot transfer with maxA x2 to maintain upright position. pt was unable to complete full upright standing postion as he kept a crouched position during pivot to chair. pt did not demonstrate safety awareness as he sat in chair abuptly.   OT Discharge Planning   OT Discharge Recommendation (DC Rec)   (uncertain at this time.)   OT Rationale for DC Rec pt will benefit from continued OT for increasing strength, actvivity tolerance, and indpendence with ADLs.   OT Brief overview of current status pt is more alert than previous session. pt is still needing max A for transfers and is unable to fully get to standing position. pt was able to follow simple directions 80% of the time, needing cues to initiate most tasks. pt refused any assist or completion of self cares this date.   Additional Documentation   Rehab Comments pt was oriented to self. towards end of session. pt became frustrated so no further cognition assessments or self cares were completed.   OT Plan continue OT   Total Session Time   Total Session Time (minutes) 30 minutes

## 2022-06-30 ENCOUNTER — APPOINTMENT (OUTPATIENT)
Dept: OCCUPATIONAL THERAPY | Facility: OTHER | Age: 72
DRG: 673 | End: 2022-06-30
Payer: MEDICARE

## 2022-06-30 ENCOUNTER — APPOINTMENT (OUTPATIENT)
Dept: PHYSICAL THERAPY | Facility: OTHER | Age: 72
DRG: 673 | End: 2022-06-30
Payer: MEDICARE

## 2022-06-30 ENCOUNTER — APPOINTMENT (OUTPATIENT)
Dept: SPEECH THERAPY | Facility: OTHER | Age: 72
DRG: 673 | End: 2022-06-30
Payer: MEDICARE

## 2022-06-30 LAB
ALBUMIN SERPL-MCNC: 3.7 G/DL (ref 3.5–5.7)
ALP SERPL-CCNC: 47 U/L (ref 34–104)
ALT SERPL W P-5'-P-CCNC: 73 U/L (ref 7–52)
ANION GAP SERPL CALCULATED.3IONS-SCNC: 12 MMOL/L (ref 3–14)
AST SERPL W P-5'-P-CCNC: 55 U/L (ref 13–39)
BILIRUB SERPL-MCNC: 0.9 MG/DL (ref 0.3–1)
BUN SERPL-MCNC: 21 MG/DL (ref 7–25)
CALCIUM SERPL-MCNC: 9.1 MG/DL (ref 8.6–10.3)
CHLORIDE BLD-SCNC: 101 MMOL/L (ref 98–107)
CO2 SERPL-SCNC: 26 MMOL/L (ref 21–31)
CREAT SERPL-MCNC: 1.07 MG/DL (ref 0.7–1.3)
ERYTHROCYTE [DISTWIDTH] IN BLOOD BY AUTOMATED COUNT: 13.6 % (ref 10–15)
GFR SERPL CREATININE-BSD FRML MDRD: 73 ML/MIN/1.73M2
GLUCOSE BLD-MCNC: 90 MG/DL (ref 70–105)
GLUCOSE BLDC GLUCOMTR-MCNC: 80 MG/DL (ref 70–99)
GLUCOSE BLDC GLUCOMTR-MCNC: 93 MG/DL (ref 70–99)
HCT VFR BLD AUTO: 43.1 % (ref 40–53)
HGB BLD-MCNC: 14.6 G/DL (ref 13.3–17.7)
LACTATE SERPL-SCNC: 0.8 MMOL/L (ref 0.7–2)
LACTATE SERPL-SCNC: 2.7 MMOL/L (ref 0.7–2)
MAGNESIUM SERPL-MCNC: 2.1 MG/DL (ref 1.9–2.7)
MCH RBC QN AUTO: 34.4 PG (ref 26.5–33)
MCHC RBC AUTO-ENTMCNC: 33.9 G/DL (ref 31.5–36.5)
MCV RBC AUTO: 101 FL (ref 78–100)
PHOSPHATE SERPL-MCNC: 3.6 MG/DL (ref 2.5–5)
PLATELET # BLD AUTO: 323 10E3/UL (ref 150–450)
POTASSIUM BLD-SCNC: 3.9 MMOL/L (ref 3.5–5.1)
POTASSIUM BLD-SCNC: 3.9 MMOL/L (ref 3.5–5.1)
PREALB SERPL IA-MCNC: 20 MG/DL (ref 15–45)
PROT SERPL-MCNC: 6.6 G/DL (ref 6.4–8.9)
RBC # BLD AUTO: 4.25 10E6/UL (ref 4.4–5.9)
SODIUM SERPL-SCNC: 139 MMOL/L (ref 134–144)
WBC # BLD AUTO: 12.8 10E3/UL (ref 4–11)

## 2022-06-30 PROCEDURE — 85014 HEMATOCRIT: CPT | Performed by: FAMILY MEDICINE

## 2022-06-30 PROCEDURE — 250N000013 HC RX MED GY IP 250 OP 250 PS 637: Performed by: INTERNAL MEDICINE

## 2022-06-30 PROCEDURE — 120N000001 HC R&B MED SURG/OB

## 2022-06-30 PROCEDURE — 36415 COLL VENOUS BLD VENIPUNCTURE: CPT | Performed by: FAMILY MEDICINE

## 2022-06-30 PROCEDURE — 250N000011 HC RX IP 250 OP 636: Performed by: INTERNAL MEDICINE

## 2022-06-30 PROCEDURE — 92526 ORAL FUNCTION THERAPY: CPT | Mod: GN

## 2022-06-30 PROCEDURE — 97116 GAIT TRAINING THERAPY: CPT | Mod: GP

## 2022-06-30 PROCEDURE — 97530 THERAPEUTIC ACTIVITIES: CPT | Mod: GO | Performed by: OCCUPATIONAL THERAPIST

## 2022-06-30 PROCEDURE — 83605 ASSAY OF LACTIC ACID: CPT | Performed by: FAMILY MEDICINE

## 2022-06-30 PROCEDURE — 84100 ASSAY OF PHOSPHORUS: CPT | Performed by: FAMILY MEDICINE

## 2022-06-30 PROCEDURE — 99232 SBSQ HOSP IP/OBS MODERATE 35: CPT | Performed by: FAMILY MEDICINE

## 2022-06-30 PROCEDURE — 97535 SELF CARE MNGMENT TRAINING: CPT | Mod: GO | Performed by: OCCUPATIONAL THERAPIST

## 2022-06-30 PROCEDURE — 250N000013 HC RX MED GY IP 250 OP 250 PS 637: Performed by: FAMILY MEDICINE

## 2022-06-30 PROCEDURE — 84132 ASSAY OF SERUM POTASSIUM: CPT | Performed by: FAMILY MEDICINE

## 2022-06-30 PROCEDURE — 83735 ASSAY OF MAGNESIUM: CPT | Performed by: FAMILY MEDICINE

## 2022-06-30 PROCEDURE — 97530 THERAPEUTIC ACTIVITIES: CPT | Mod: GP

## 2022-06-30 PROCEDURE — 84134 ASSAY OF PREALBUMIN: CPT | Performed by: FAMILY MEDICINE

## 2022-06-30 RX ORDER — METOPROLOL TARTRATE 25 MG/1
25 TABLET, FILM COATED ORAL 2 TIMES DAILY
Status: DISCONTINUED | OUTPATIENT
Start: 2022-06-30 | End: 2022-07-11

## 2022-06-30 RX ADMIN — NICOTINE 1 PATCH: 14 PATCH, EXTENDED RELEASE TRANSDERMAL at 11:08

## 2022-06-30 RX ADMIN — LISINOPRIL 20 MG: 20 TABLET ORAL at 10:34

## 2022-06-30 RX ADMIN — THIAMINE HCL TAB 100 MG 300 MG: 100 TAB at 10:37

## 2022-06-30 RX ADMIN — QUETIAPINE FUMARATE 100 MG: 100 TABLET ORAL at 21:31

## 2022-06-30 RX ADMIN — CLONIDINE HYDROCHLORIDE 0.1 MG: 0.1 TABLET ORAL at 19:22

## 2022-06-30 RX ADMIN — METOPROLOL TARTRATE 25 MG: 25 TABLET, FILM COATED ORAL at 21:31

## 2022-06-30 RX ADMIN — FAMOTIDINE 20 MG: 20 TABLET ORAL at 10:35

## 2022-06-30 RX ADMIN — HYDRALAZINE HYDROCHLORIDE 20 MG: 20 INJECTION INTRAMUSCULAR; INTRAVENOUS at 11:45

## 2022-06-30 RX ADMIN — Medication 1 TABLET: at 10:34

## 2022-06-30 RX ADMIN — METOPROLOL TARTRATE 25 MG: 25 TABLET, FILM COATED ORAL at 15:01

## 2022-06-30 RX ADMIN — FOLIC ACID 1 MG: 1 TABLET ORAL at 10:34

## 2022-06-30 RX ADMIN — RIVAROXABAN 15 MG: 15 TABLET, FILM COATED ORAL at 17:52

## 2022-06-30 RX ADMIN — RIVAROXABAN 15 MG: 15 TABLET, FILM COATED ORAL at 10:35

## 2022-06-30 RX ADMIN — FAMOTIDINE 20 MG: 20 TABLET ORAL at 21:31

## 2022-06-30 ASSESSMENT — ACTIVITIES OF DAILY LIVING (ADL)
ADLS_ACUITY_SCORE: 48

## 2022-06-30 NOTE — PROGRESS NOTES
06/30/22 1500   Signing Clinician's Name / Credentials   Signing clinician's name / credentials Rosenda Gonzalez, OTR/L   Quick Adds   Rehab Discipline OT   Functional Transfer Training   Symptoms Noted During/After Treatment fatigue   Treatment Detail pt requesting to use commode, attempting to sit up on his own, impulsive but was able to re-direct him, max assist of 2-3 to pivot transfer to commode, 1 person needed to assist with pants, pt was incontinent of stool requirning total assist to manage, set up for eating and max assist to comb hair as pt would not attempt   OT Discharge Planning   OT Discharge Recommendation (DC Rec)   (disposition uncertain at this time)   OT Rationale for DC Rec Will continue to assess   OT Brief overview of current status Pt was more alert today, less yelling out but remains impulsive, was attempting to get out of chair to commode by himself, pt needed max assist o f2-3 for pivot transfer to commode, max assist to manage bowel incontinence, pt was oriented to person, month today and is expressing needs better.   Additional Documentation   OT Plan cont OT   Total Session Time   Total Session Time (minutes) 45 minutes

## 2022-06-30 NOTE — PROGRESS NOTES
:     Patient is from [facility]. Patient is to return to half-way at the time of discharge.  will continue to follow for discharge planning needs.     REECE Rodriguez on 6/30/2022 at 3:40 PM

## 2022-06-30 NOTE — PROGRESS NOTES
Notified by previous nurse that patient fired sepsis protocol. Patient's lactic came back at 2.7 and MD was notified. MD ordered to have a redraw in two hours.

## 2022-06-30 NOTE — PROGRESS NOTES
Wife and friend visited.  Wife has anxiety of possible nursing home admission and assets being taken away.  Jorge, son, is aware of his mother's issues.  Patient has confusion and calls out.  Up in the chair today.  No sedation meds are to be used.  Redirection is the plan and/or sitter.  Hyralizine given for high B/P.

## 2022-06-30 NOTE — PROGRESS NOTES
06/30/22 1400   Signing Clinician's Name / Credentials   Signing clinician's name / credentials Obi Short MPT   Quick Adds   Rehab Discipline PT   Functional Transfer Training   Treatment Detail moderate assist of 2 with Fww   Gait Training   Symptoms Noted During/After Treatment (Gait Training) fatigue   Treatment Detail patient unable to functionally ambulate/take steps due to fatigue/instability   [place] Level (Gait Training) moderate assist (50% patient effort)   Impairments (Gait Analysis/Training) balance impaired;coordination impaired   Therapeutic Activity   Treatment Detail moderate assist for bed mobilities   PT Discharge Planning   PT Discharge Recommendation (DC Rec) Transitional Care Facility   PT Rationale for DC Rec to promote patient's highest level of functional mobility   PT Brief overview of current status patient requiring maximal to moderate assist for bed mobilities; able to sit edge of bed with minimal assist to CGA; sit to stand with moderate assist of 2; able to stand with Fww and moderate to minimal assist of 2 and verbal cues to increase erect posture x approx. 1 minute seconds; fair ability to follow one step commands; transfers with moderate assistof 2 with Fww   Additional Documentation   Rehab Comments mild improvement with standing ability during recliner<>commode transfers using a Fww   PT Plan Continue PT   Total Session Time   Total Session Time (minutes) 40 minutes

## 2022-06-30 NOTE — PROGRESS NOTES
06/30/22 1300   Signing Clinician's Name / Credentials   Signing clinician's name / credentials SHARYN Case-SLP   Quick Adds   Rehab Discipline SLP   Quick Adds Interventions Speech Language Pathology   SLP - Dysphagia/Swallow   Minutes of Treatment 15 minutes   Symptoms Noted During/After Treatment None   Treatment Detail PT was sitting up in chair upon clinician arrival. He was agreeable to PO trials; trialed thin liquids and pureed textures. No overt s/s aspiration or penetration during trials. Pt consuming ~8 oz pudding and 4 oz water requiring total assist for PO intake. Oral/lingual residuals observed for last 3 trials of pudding, requiring liquid wash. Pt declining further trials. Pt educated on nutrition/food intake to promote health. Pt verbalizing understanding, however, continuing to decline food. Clinician left 2 pudding cups and advised pt to eat. MD notified of assessment results and recommendations of ordering food with 1:1 feeding. RONALDO Alvarez notified of recommendations and verbalized agreement. PT to remain on IDDSI 0 and IDDSI 5 (minced and moist). ST to follow for duration of stay to ensure pt in on least restrictive diet.   SLP Discharge Planning   SLP Discharge Recommendation   (Unknown at this time)   SLP Rationale for DC Rec Pt requiring modified diet and has poor PO intake.   SLP Brief overview of current status  Pt participating in PO trials. Continues to decline food from staff. Due to lack ot dentition, Pt ro remain on IDDSI 0 and 5 (minced and moist) to prevent fatigue and promote PO intake.   Additional Documentation   SLP Plan Continue ST   Total Session Time   Total Session Time (minutes) 15 minutes

## 2022-06-30 NOTE — PLAN OF CARE
Patient is disoriented x3. Made several attempts to get out of bed until bedtime Seroquel was given. Slept comfortably rest of shift. Repositioned q2hr with staff assistance and independently. Dressings to wounds on buttocks/hip CDI. No changes to redness on inner thigh and groin. Heels elevated intermittently. Escobedo in place to promote wound healing. Output is dark, tea-colored. Fecal incontinence. VSS. Will continue to monitor.

## 2022-06-30 NOTE — PROGRESS NOTES
[facility]    Medicine Progress Note - Hospitalist Service    Date of Admission:  6/17/2022    Assessment & Plan                        Antonio Rutherford is a 72 year old male admitted on 6/17/2022. He presented from the [facility] for concerns of possible kidney stone versus sepsis.     Patient was admitted to the [facility] 7 days PTA.  He is a daily drinker of whiskey and was treated for alcohol withdrawal at the [facility] and then started on Bactrim for possible UTI 4 days PTA.  He had extremely poor oral intake PTA (20 oz in 7d per [facility] nurse report).     Patient was admitted treated with thiamine, folate and IV fluids, strict I's and O's and close monitoring.  Developed worsening confusion and somnolence and concern to protect his airway.  Got intubated on 6/21/2022, extubated 6/24/2022.    Now with slow daily improvement in his mental status and functionality.    Of note is he fired sepsis protocol due to heart rate of 102.  Lactate minimally elevated with no evidence of active infection.  Will recheck in 2 hours.       Alcohol dependence with withdrawal delirium (H):   Assessment: Resolved.  Sedated and intubated in the ICU on 6/21/22 for somnolence, extubated on 6/24/2022. Ammonia level was normal, ABG did not show significant CO2 retention, head CT and brain MRI neg for acute findings. Repeat CT of the head negative.    -Discontinue CIWA on 6/27  -Minimize sedating medication  -Appreciate social work input for chemical dependency needs when mental status clear     Encephalopathy, metabolic.  Significantly improved and oriented to person and hospital today, guesses a year.  CT and MRI head/brain negative for acute findings. Likely due to ICU acquired delirium at this time.  Cannot exclude encephalopathy , he should be out of the timeframe for withdrawal.    Plan:   -Delirium order set  -Appreciate PT/OT input for dispo needs     Stage 1 pressure ulcer of buttocks with area of unstageable eschar  Heel  pressure ulcer stage 1. Left foot.  Present on admission  -offload   -Air mattress  -Continue Escobedo to minimize skin breakdown     Acute PE, POA: Diagnosed 6/17, likely provoked from immobility and acute illness.  -Initially started on lovenox 1.5mg/kg/d now able to take oral medication  -Continue Xarelto for PE treatment     Aortic valve echodensity: Probable chronic calcific nodule, could also represent small clot, unlikely to represent infective endocarditis process with initial negative blood cultures.  No evidence of CVA given MRI findings and at this point given how critically ill he has been will anticoagulate as above, monitor for clinical improvement and consider repeat ELLEN as an outpatient to ensure stability/resolution.  -Follow-up repeat blood cultures NG x3d     CHARLOTTE due to ATN (acute tubular necrosis) (H): Resolved, creatinine peaked at 2.7, likely from prerenal component from hypovolemia and infection and possible ATN from Bactrim/ibuprofen prior to admission.    Plan:  -Monitor daily   -avoid NSAIDs and nephrotoxins     Community-acquired versus aspiration pneumonia: Possibly contributing to need for intubation with possible component of aspiration given alcohol abuse  -Completed 7-day course of Zosyn     UTI. POA: Resolved.  Empirically treated with Bactrim, Urine culture negative.  No growth in urine culture. Blood cultures negative.      Essential hypertension: Stable.  -Continue as needed hydralazine and-labetalol  -Continue home ACE  -Restart metoprolol at 25 mg twice daily given heart rate      Severe protein calorie malnutrition: Generally poor oral intake  -Inpatient dietary eval      Sinus tachycardia: Resolved, likely from withdrawal.  Got amiodarone x24 hours. Now HR regular and fairly well controlled, moderate elevation especially with agitation.   Plan:   -Discontinue telemetry      diarrhea: Resolved  -C. difficile and stool culture negative.    -Imodium as needed     Smoker.  No  wheezing or contribution of COPD exacerbation during his stay      Hypokalemia  Likely from poor oral intake.  Improved.  Replace per protocol-                Diet: Minced & Moist Diet (level 5) Thin Liquids (level 0)    DVT Prophylaxis: DOAC  Escobedo Catheter: PRESENT, indication: Wound Healing, Strict 1-2 Hour I&O  Central Lines: None  Cardiac Monitoring: None  Code Status: Full Code      Disposition Plan    several days while improving his functional status so he can go back to [facility]     The patient's care was discussed with the Patient and Patient's Family.    Karl Menjivar MD  Hospitalist Service  [facility]  Securely message with the Vocera Web Console (learn more here)  Text page via [employer] Paging/Directory         Clinically Significant Risk Factors Present on Admission                      ______________________________________________________________________    Interval History   Patient does answer questions today.  He reports he is in no pain.  He knows he is in the hospital.  He does not know why.  He is able to eat when prompted and fed.  Nursing notes reviewed.  No new issues.  Did better with PT today and was able to get up and walk to commode with help and knew he had to poop.    Data reviewed today: I reviewed all medications, new labs and imaging results over the last 24 hours. I personally reviewed no images or EKG's today.    Physical Exam   Vital Signs: Temp: 98.9  F (37.2  C) Temp src: Tympanic BP: (!) (P) 200/92 Pulse: (P) 91   Resp: 18 SpO2: 92 % O2 Device: None (Room air)    Weight: 205 lbs 11.03 oz  Constitutional: Somnolent but does awaken with voice or stimulation  Respiratory: Clear to auscultation bilaterally  Cardiovascular: Regular rate and rhythm  GI: Abdomen soft, nontender  Musculoskeletal: Generalized weakness with no focal deficits  Neuropsychiatric: Alert and oriented to person and place but not time or situation    Data   Recent Labs   Lab 06/30/22  1625  06/30/22  1416 06/30/22  0559 06/29/22 2002 06/29/22  0800 06/29/22  0630 06/28/22  0704 06/28/22  0620 06/27/22  1406 06/27/22  0536   WBC 12.8*  --   --   --   --   --   --  9.2  --  12.3*   HGB 14.6  --   --   --   --   --   --  13.3  --  14.0   *  --   --   --   --   --   --  103*  --  102*     --   --   --   --   --   --  289  --  309   INR  --   --   --   --   --   --   --  1.13  --   --      --   --   --   --   --   --  138  --  138   POTASSIUM 3.9  --  3.9  --   --  3.7   < > 3.4*   < > 3.3*  3.3*   CHLORIDE 101  --   --   --   --   --   --  103  --  101   CO2 26  --   --   --   --   --   --  23  --  26   BUN 21  --   --   --   --   --   --  18  --  15   CR 1.07  --   --   --   --   --   --  0.82  --  1.02   ANIONGAP 12  --   --   --   --   --   --  12  --  11   PAWAN 9.1  --   --   --   --   --   --  8.7  --  9.0   GLC 90 93  --  109*   < >  --    < > 84  --  90   ALBUMIN 3.7  --   --   --   --   --   --   --   --  3.3*   PROTTOTAL 6.6  --   --   --   --   --   --   --   --  6.2*   BILITOTAL 0.9  --   --   --   --   --   --   --   --  1.1*   ALKPHOS 47  --   --   --   --   --   --   --   --  42   ALT 73*  --   --   --   --   --   --   --   --  37   AST 55*  --   --   --   --   --   --   --   --  38    < > = values in this interval not displayed.     No results found for this or any previous visit (from the past 24 hour(s)).

## 2022-06-30 NOTE — PROGRESS NOTES
06/29/22 1600   Signing Clinician's Name / Credentials   Signing clinician's name / credentials Obi Short MPT   Quick Adds   Rehab Discipline PT   Functional Transfer Training   Symptoms Noted During/After Treatment fatigue   Treatment Detail moderate assist of 2 for sit to stand and stand pivot transfer using a Fww   Gait Training   Impairments (Gait Analysis/Training) balance impaired;coordination impaired;strength decreased   Therapeutic Activity   Treatment Detail moderate assist for bed mobilities   PT Discharge Planning   PT Discharge Recommendation (DC Rec) Transitional Care Facility   PT Rationale for DC Rec to promote patient's highest level of functional mobility   PT Brief overview of current status patient requiring maximal to moderate assist for bed mobilities; able to sit edge of bed with minimal assist to CGA; sit to stand with moderate assist of 2; able to stand with Fww and moderate to minimal assist of 2 and verbal cues to increase erect posture x 15-20 seconds; fair ability to follow one step commands; transfers with moderate assistof 2 with Fww   Additional Documentation   Rehab Comments patient with decreased strength and activity tolerance continues to require assist for mobility   PT Plan Continue PT   Total Session Time   Total Session Time (minutes) 55 minutes

## 2022-07-01 ENCOUNTER — APPOINTMENT (OUTPATIENT)
Dept: PHYSICAL THERAPY | Facility: OTHER | Age: 72
DRG: 673 | End: 2022-07-01
Payer: MEDICARE

## 2022-07-01 ENCOUNTER — ANESTHESIA EVENT (OUTPATIENT)
Dept: MEDSURG UNIT | Facility: OTHER | Age: 72
DRG: 673 | End: 2022-07-01
Payer: MEDICARE

## 2022-07-01 ENCOUNTER — ANESTHESIA (OUTPATIENT)
Dept: MEDSURG UNIT | Facility: OTHER | Age: 72
DRG: 673 | End: 2022-07-01
Payer: MEDICARE

## 2022-07-01 ENCOUNTER — APPOINTMENT (OUTPATIENT)
Dept: OCCUPATIONAL THERAPY | Facility: OTHER | Age: 72
DRG: 673 | End: 2022-07-01
Payer: MEDICARE

## 2022-07-01 LAB
GLUCOSE BLDC GLUCOMTR-MCNC: 60 MG/DL (ref 70–99)
GLUCOSE BLDC GLUCOMTR-MCNC: 60 MG/DL (ref 70–99)
GLUCOSE BLDC GLUCOMTR-MCNC: 70 MG/DL (ref 70–99)
GLUCOSE BLDC GLUCOMTR-MCNC: 74 MG/DL (ref 70–99)
GLUCOSE BLDC GLUCOMTR-MCNC: 97 MG/DL (ref 70–99)

## 2022-07-01 PROCEDURE — 99232 SBSQ HOSP IP/OBS MODERATE 35: CPT | Performed by: FAMILY MEDICINE

## 2022-07-01 PROCEDURE — 250N000009 HC RX 250: Performed by: NURSE ANESTHETIST, CERTIFIED REGISTERED

## 2022-07-01 PROCEDURE — 97530 THERAPEUTIC ACTIVITIES: CPT | Mod: GP

## 2022-07-01 PROCEDURE — 120N000001 HC R&B MED SURG/OB

## 2022-07-01 PROCEDURE — 97116 GAIT TRAINING THERAPY: CPT | Mod: GP

## 2022-07-01 PROCEDURE — 250N000013 HC RX MED GY IP 250 OP 250 PS 637: Performed by: FAMILY MEDICINE

## 2022-07-01 PROCEDURE — 250N000013 HC RX MED GY IP 250 OP 250 PS 637: Performed by: INTERNAL MEDICINE

## 2022-07-01 PROCEDURE — 97530 THERAPEUTIC ACTIVITIES: CPT | Mod: GO | Performed by: OCCUPATIONAL THERAPIST

## 2022-07-01 PROCEDURE — 97535 SELF CARE MNGMENT TRAINING: CPT | Mod: GO | Performed by: OCCUPATIONAL THERAPIST

## 2022-07-01 PROCEDURE — 250N000011 HC RX IP 250 OP 636: Performed by: INTERNAL MEDICINE

## 2022-07-01 PROCEDURE — 36410 VNPNXR 3YR/> PHY/QHP DX/THER: CPT | Performed by: NURSE ANESTHETIST, CERTIFIED REGISTERED

## 2022-07-01 RX ORDER — LIDOCAINE HYDROCHLORIDE 10 MG/ML
INJECTION, SOLUTION INFILTRATION; PERINEURAL PRN
Status: DISCONTINUED | OUTPATIENT
Start: 2022-07-01 | End: 2022-07-01

## 2022-07-01 RX ORDER — NYSTATIN 100000 [USP'U]/G
POWDER TOPICAL 2 TIMES DAILY
Status: DISCONTINUED | OUTPATIENT
Start: 2022-07-01 | End: 2022-07-12 | Stop reason: HOSPADM

## 2022-07-01 RX ADMIN — THIAMINE HCL TAB 100 MG 300 MG: 100 TAB at 11:28

## 2022-07-01 RX ADMIN — FAMOTIDINE 20 MG: 20 TABLET ORAL at 11:27

## 2022-07-01 RX ADMIN — FOLIC ACID 1 MG: 1 TABLET ORAL at 11:28

## 2022-07-01 RX ADMIN — LIDOCAINE HYDROCHLORIDE 0.5 ML: 10 INJECTION, SOLUTION INFILTRATION; PERINEURAL at 11:02

## 2022-07-01 RX ADMIN — LISINOPRIL 20 MG: 20 TABLET ORAL at 11:28

## 2022-07-01 RX ADMIN — NICOTINE 1 PATCH: 14 PATCH, EXTENDED RELEASE TRANSDERMAL at 11:33

## 2022-07-01 RX ADMIN — METOPROLOL TARTRATE 25 MG: 25 TABLET, FILM COATED ORAL at 11:28

## 2022-07-01 RX ADMIN — RIVAROXABAN 15 MG: 15 TABLET, FILM COATED ORAL at 11:33

## 2022-07-01 RX ADMIN — NYSTATIN: 100000 POWDER TOPICAL at 22:51

## 2022-07-01 RX ADMIN — QUETIAPINE FUMARATE 100 MG: 100 TABLET ORAL at 22:48

## 2022-07-01 RX ADMIN — FAMOTIDINE 20 MG: 20 TABLET ORAL at 22:48

## 2022-07-01 RX ADMIN — METOPROLOL TARTRATE 25 MG: 25 TABLET, FILM COATED ORAL at 22:49

## 2022-07-01 RX ADMIN — RIVAROXABAN 15 MG: 15 TABLET, FILM COATED ORAL at 17:36

## 2022-07-01 RX ADMIN — Medication 1 TABLET: at 11:28

## 2022-07-01 RX ADMIN — OLANZAPINE 5 MG: 10 INJECTION, POWDER, FOR SOLUTION INTRAMUSCULAR at 23:56

## 2022-07-01 ASSESSMENT — ACTIVITIES OF DAILY LIVING (ADL)
ADLS_ACUITY_SCORE: 46
ADLS_ACUITY_SCORE: 44
ADLS_ACUITY_SCORE: 52
ADLS_ACUITY_SCORE: 46
ADLS_ACUITY_SCORE: 50
ADLS_ACUITY_SCORE: 50
ADLS_ACUITY_SCORE: 48
ADLS_ACUITY_SCORE: 44
ADLS_ACUITY_SCORE: 48
ADLS_ACUITY_SCORE: 44

## 2022-07-01 NOTE — PROGRESS NOTES
07/01/22 1346   Signing Clinician's Name / Credentials   Signing clinician's name / credentials Rosenda Gonzalez OTR/L   Quick Adds   Rehab Discipline OT   Functional Transfer Training   Symptoms Noted During/After Treatment fatigue   Treatment Detail Pt needed max assist of 2 for supine to sit, Mod to Max assist of 2 with FWW to take a few side steps to recliner/bed. Pt had difficulty turning feet once standing upright. Pt needed max assist with all self cares, was able to wash face with cues. Pt was oriented to person and place today, more aware of situation.   OT Discharge Planning   OT Discharge Recommendation (DC Rec) Transitional Care Facility   OT Rationale for DC Rec Pt needs assist of 2 people for all mobility and assist with all self cares, needs further therapies to gain independence and strength needed for safe disposition   OT Brief overview of current status see treatment details above   Additional Documentation   OT Plan cont OT   Total Session Time   Total Session Time (minutes) 45 minutes

## 2022-07-01 NOTE — PLAN OF CARE
Patient restless and irritable. Disoriented x3. Frequently yells out. Encouraged to use call light when in need of assistance. No changes to wounds. Dressings CDI. No changes to redness around groin. Skin barrier cream applied. Repositioned q2hr. Fecal incontinence x1. Escobedo patent with tea-colored output. VSS.

## 2022-07-01 NOTE — PROGRESS NOTES
Confused, forgetful but follows orders.  Dressings on right inner thigh, buttocks dressing that covers the upper butt crease.  Right heal blister without a dressing.  Red meatus/groin with new order for Nystatin powder.  PT/OT stated he can walk about 3 feet with heavy assist of 2.  Stand and pivot to commode.  Escobedo intact.  Anesthesia IV start today..

## 2022-07-01 NOTE — PROGRESS NOTES
:     Spoke with patients . She stated that she has not yet heard back from the court on whether or not patient should return to senior care. She stated that as of now he has to but this may change. She will provide an update when able.      will continue to follow for discharge planning needs.     REECE Rodriguez on 7/1/2022 at 10:51 AM

## 2022-07-01 NOTE — PROGRESS NOTES
[facility]    Medicine Progress Note - Hospitalist Service    Date of Admission:  6/17/2022    Assessment & Plan             Antonio Rutherford is a 72 year old male admitted on 6/17/2022. He presented from the half-way for concerns of possible kidney stone versus sepsis.     Patient was admitted to the half-way 7 days PTA.  He is a daily drinker of whiskey and was treated for alcohol withdrawal at the half-way and then started on Bactrim for possible UTI 4 days PTA.  He had extremely poor oral intake PTA (20 oz in 7d per half-way nurse report).     Patient was admitted treated with thiamine, folate and IV fluids, strict I's and O's and close monitoring.  Developed worsening confusion and somnolence and concern to protect his airway.  Got intubated on 6/21/2022, extubated 6/24/2022.    Now with ongoing slow daily improvement in his mental status and functionality.       Alcohol dependence with withdrawal delirium (H):   Assessment: Resolved.  Sedated and intubated in the ICU on 6/21/22 for somnolence, extubated on 6/24/2022. Ammonia level was normal, ABG did not show significant CO2 retention, head CT and brain MRI neg for acute findings. Repeat CT of the head negative.    -Discontinued CIWA on 6/27  -Minimize sedating medication  -Appreciate social work input for chemical dependency needs when mental status clear     Encephalopathy, metabolic.  CT and MRI head/brain negative for acute findings. Likely due to ICU acquired delirium at this time.  Cannot exclude encephalopathy , he should be out of the timeframe for withdrawal.    Ongoing improvement.  Today is alert and oriented to person place and situation and able to follow commands.  Plan:   -Delirium order set  -Appreciate PT/OT input for dispo needs     Stage 1 pressure ulcer of buttocks with area of unstageable eschar  Heel pressure ulcer stage 1. Left foot.  Present on admission  -offload   -Air mattress  -Continue Escobedo to minimize skin breakdown     Acute  PE, POA: Diagnosed 6/17, likely provoked from immobility and acute illness.  -Initially started on lovenox 1.5mg/kg/d now able to take oral medication  -Continue Xarelto for PE treatment     Aortic valve echodensity: Probable chronic calcific nodule, could also represent small clot, unlikely to represent infective endocarditis process with initial negative blood cultures.  No evidence of CVA given MRI findings and at this point given how critically ill he has been will anticoagulate as above, monitor for clinical improvement and consider repeat ELLEN as an outpatient to ensure stability/resolution.  -Follow-up repeat blood cultures NG x3d     CHARLOTTE due to ATN (acute tubular necrosis) (H): Resolved, creatinine peaked at 2.7, likely from prerenal component from hypovolemia and infection and possible ATN from Bactrim/ibuprofen prior to admission.    Plan:  -Monitor daily   -avoid NSAIDs and nephrotoxins     Community-acquired versus aspiration pneumonia: Possibly contributing to need for intubation with possible component of aspiration given alcohol abuse  -Completed 7-day course of Zosyn     UTI. POA: Resolved.  Empirically treated with Bactrim, Urine culture negative.  No growth in urine culture. Blood cultures negative.      Essential hypertension: Stable.  -Continue as needed hydralazine and-labetalol  -Continue home ACE  -Restart metoprolol at 25 mg twice daily given heart rate      Severe protein calorie malnutrition: Generally poor oral intake  -Inpatient dietary eval      Sinus tachycardia: Resolved, likely from withdrawal.  Got amiodarone x24 hours. Now HR regular and fairly well controlled, moderate elevation especially with agitation.   Plan:   -Discontinue telemetry      diarrhea: Resolved  -C. difficile and stool culture negative.    -Imodium as needed     Smoker.  No wheezing or contribution of COPD exacerbation during his stay      Hypokalemia  Likely from poor oral intake.  Improved.  Replace per  protocol-    Malnutrition  Prealbumin has been stable at 20.  Patient needs assistance with eating at this time.  Continue with high-calorie supplements.                  Diet: Minced & Moist Diet (level 5) Thin Liquids (level 0)    DVT Prophylaxis: Enoxaparin (Lovenox) SQ  Escobedo Catheter: PRESENT, indication: Wound Healing, Strict 1-2 Hour I&O  Central Lines: None  Cardiac Monitoring: None  Code Status: Full Code      Disposition Plan    I suspect patient will be here through the weekend with ongoing PT and hopeful improvement in his mental status prior to being able to return to longterm safely     The patient's care was discussed with the Patient and Patient's Family.    Karl Menjivar MD  Hospitalist Service  [facility]  Securely message with the Vocera Web Console (learn more here)  Text page via [employer] Paging/Directory         Clinically Significant Risk Factors Present on Admission                      ______________________________________________________________________    Interval History   Patient tells me that he is not having any pain.  He does know that he is in the hospital in [place].  He thinks he is here because he was accused to trial more station.  I reminded him that he came here in kidney failure and with alcohol withdrawal.  Right now it is unclear if he is willing to quit drinking.  I talked with his son at length.  He is concerned that he will to start drinking again at home and wants to see him get help to get sober.  I did discuss them in detail some of the challenges with this given the charges against him but also that in order for alcohol treatment to be successful he needs to desire sobriety or even after several weeks of treatment he is exceedingly likely to start drinking again on discharge.    Patient did not have any fevers or chills.  He is starting to do better with his mobility and yesterday it was able to let staff know when he had to poop although did  not quite make it to the toilet.    Data reviewed today: I reviewed all medications, new labs and imaging results over the last 24 hours. I personally reviewed no images or EKG's today.    Physical Exam   Vital Signs: Temp: 98.6  F (37  C) Temp src: Tympanic BP: 119/71 Pulse: 82   Resp: 16 SpO2: 94 % O2 Device: None (Room air)    Weight: 204 lbs 0 oz  Constitutional: awake, alert, cooperative, no apparent distress, and appears stated age  Respiratory: No increased work of breathing, good air exchange, clear to auscultation bilaterally, no crackles or wheezing  Cardiovascular: Regular rate and rhythm.  No murmurs rubs or gallops heard.   GI: Abdomen Soft, non-tender.  No masses, rebound or guarding.   Musculoskeletal: Generalized weakness but moves all extremities  Neuropsychiatric: General: normal, calm and normal eye contact  Orientation: oriented to self, place, time and situation  Memory and insight: memory for past and recent events intact and thought process normal    Data   Recent Labs   Lab 07/01/22  0905 06/30/22  2202 06/30/22  1448 06/30/22  1416 06/30/22  0559 06/29/22  0800 06/29/22  0630 06/28/22  0704 06/28/22  0620 06/27/22  1406 06/27/22  0536   WBC  --   --  12.8*  --   --   --   --   --  9.2  --  12.3*   HGB  --   --  14.6  --   --   --   --   --  13.3  --  14.0   MCV  --   --  101*  --   --   --   --   --  103*  --  102*   PLT  --   --  323  --   --   --   --   --  289  --  309   INR  --   --   --   --   --   --   --   --  1.13  --   --    NA  --   --  139  --   --   --   --   --  138  --  138   POTASSIUM  --   --  3.9  --  3.9  --  3.7   < > 3.4*   < > 3.3*  3.3*   CHLORIDE  --   --  101  --   --   --   --   --  103  --  101   CO2  --   --  26  --   --   --   --   --  23  --  26   BUN  --   --  21  --   --   --   --   --  18  --  15   CR  --   --  1.07  --   --   --   --   --  0.82  --  1.02   ANIONGAP  --   --  12  --   --   --   --   --  12  --  11   PAWAN  --   --  9.1  --   --   --   --   --   8.7  --  9.0   GLC 74 80 90   < >  --    < >  --    < > 84  --  90   ALBUMIN  --   --  3.7  --   --   --   --   --   --   --  3.3*   PROTTOTAL  --   --  6.6  --   --   --   --   --   --   --  6.2*   BILITOTAL  --   --  0.9  --   --   --   --   --   --   --  1.1*   ALKPHOS  --   --  47  --   --   --   --   --   --   --  42   ALT  --   --  73*  --   --   --   --   --   --   --  37   AST  --   --  55*  --   --   --   --   --   --   --  38    < > = values in this interval not displayed.     No results found for this or any previous visit (from the past 24 hour(s)).

## 2022-07-01 NOTE — PROGRESS NOTES
Clinical Nutrition /Reassessment     Assessment:   Client History:  Starting to eat more when assisted. Plan at this time is to order food and assist pt at meals otherwise he seems to always refuse. Also, added Ensure TID on trays for additional kcal/protein. Continue to assist and encourage.   Diet Order:  IDDSI level 5 foods (minced and moist, level 0 fluids (thin)-speech therapy working with him as he allows   Oral Intake: ate about 1/4 his meal earlier today with staff assistance. Intakes have improved with assistance.   Supplements: Ensure TID on trays   Weight:   Vitals:    06/17/22 1613 06/18/22 0243 06/21/22 0409 06/22/22 0600   Weight: 96.2 kg (212 lb) 90.2 kg (198 lb 12.8 oz) 90.7 kg (199 lb 15.3 oz) 95.7 kg (210 lb 15.7 oz)    06/24/22 0348 06/25/22 0418 06/26/22 0557 06/27/22 0400   Weight: 100.5 kg (221 lb 9 oz) 99 kg (218 lb 4.1 oz) 96.8 kg (213 lb 6.5 oz) 95.5 kg (210 lb 8.6 oz)    06/27/22 1111 06/28/22 0515 06/29/22 0200 06/29/22 2346   Weight: 93.3 kg (205 lb 11 oz) 92.4 kg (203 lb 11.3 oz) 91.5 kg (201 lb 11.5 oz) 93.3 kg (205 lb 11 oz)    07/01/22 0100   Weight: 92.5 kg (204 lb)     Malnutrition Criteria:  (Need to have 2 indicators to qualify recommendation)  Energy Intake:  Acute Severe: </= 50% of estimated energy requirement for >/= 5 days  Interpretation of Weight Loss:  No significant weight loss in past year noted per chart review and lack of information in the time frame  Physical Findings:  No significant findings  Reduced  Strength:  noted weakness, likely reduced with current illness    Recommended Nutrition Diagnosis:   Severe Malnutrition in the context of acute illness or injury - based on AND/ASPEN Clinical Characterstics of Malnutrition May 2012      Nutrition Education: Nutrition education will be provided as appropriate.    Nutrition Diagnosis: Oral or Nutrition Support Intake:  inadequate energy intakes related to refusal to consume oral nutrition/NPO status with intubation  as evidenced by minimal/no intakes x past 6+ days and prior to that, poor intakes    Intervention:  Nutrition Prescription:     Nutrition Intervention(s):Recommended general, healthful diet --IDDSI level 5 foods, level 0 fluids  1. Supplements: TID on trays  2. Staff to assist as needed and allowed for meals/snacks     Nutrition Goal(s):  1. Pt will consume 50% or more at meals- not met, on going   2. Pt will not have unplanned wt loss during hospitalization - on going  3. Pt will tolerate diet as ordered- on going, speech working with him for most appropriate textures    Monitoring and Evaluation:   Food Intake, tolerance, weight     Discharge Recommendation:   Nutrition Discharge Planning  Will address closer to discharge     RD will reassess in within 1-5 days or sooner.  Leidy Joyce RD on 7/1/2022 at 1:54 PM

## 2022-07-01 NOTE — PROGRESS NOTES
07/01/22 8855   Signing Clinician's Name / Credentials   Signing clinician's name / credentials Bentley Bowers DPT   Quick Adds   Rehab Discipline PT   Gait Training   Symptoms Noted During/After Treatment (Gait Training) fatigue   Distance in Feet (Required for LE Total Joints) 3   Treatment Detail very small steps taken to chair, needed assist to turn all the way before descending. Similar transfer back to bed for CRNA to place IV   [place] Level (Gait Training) maximum assist (25% patient effort)   Physical Assistance Level (Gait Training) verbal cues   Therapeutic Activity   Symptoms Noted During/After Treatment Fatigue;Increased pain   Treatment Detail MAX A x 2 for bed mobility. Pt able to move legs short distance to EOB, needed assist getting remaining distance. Unable to use UE and trunk for upright positioning so assist provided at torso. Sitting at EOB with SBA. Sit<>stand with FWW MIN A x 2.   PT Discharge Planning   PT Discharge Recommendation (DC Rec) Transitional Care Facility   PT Rationale for DC Rec Pt not presently ambulating at a functional level. High amounts of assist needed. Unlikely to be placed in a SNF though due to criminal history   PT Brief overview of current status Max assist for bed mobility and ambulation. Min assist for transfers. Agrees to activity today. Pt transferred to chair but CRNA needing to place IV, unable to be placed while in recliner. Assisted back to bed.   Additional Documentation   PT Plan Continue PT   Total Session Time   Total Session Time (minutes) 45 minutes

## 2022-07-02 ENCOUNTER — APPOINTMENT (OUTPATIENT)
Dept: OCCUPATIONAL THERAPY | Facility: OTHER | Age: 72
DRG: 673 | End: 2022-07-02
Payer: MEDICARE

## 2022-07-02 ENCOUNTER — APPOINTMENT (OUTPATIENT)
Dept: PHYSICAL THERAPY | Facility: OTHER | Age: 72
DRG: 673 | End: 2022-07-02
Payer: MEDICARE

## 2022-07-02 LAB
ALBUMIN SERPL-MCNC: 3.2 G/DL (ref 3.5–5.7)
ALP SERPL-CCNC: 40 U/L (ref 34–104)
ALT SERPL W P-5'-P-CCNC: 51 U/L (ref 7–52)
ANION GAP SERPL CALCULATED.3IONS-SCNC: 12 MMOL/L (ref 3–14)
AST SERPL W P-5'-P-CCNC: 41 U/L (ref 13–39)
BACTERIA BLD CULT: NO GROWTH
BACTERIA BLD CULT: NO GROWTH
BILIRUB SERPL-MCNC: 0.6 MG/DL (ref 0.3–1)
BUN SERPL-MCNC: 25 MG/DL (ref 7–25)
CALCIUM SERPL-MCNC: 8.7 MG/DL (ref 8.6–10.3)
CHLORIDE BLD-SCNC: 105 MMOL/L (ref 98–107)
CO2 SERPL-SCNC: 23 MMOL/L (ref 21–31)
CREAT SERPL-MCNC: 1.02 MG/DL (ref 0.7–1.3)
ERYTHROCYTE [DISTWIDTH] IN BLOOD BY AUTOMATED COUNT: 13.4 % (ref 10–15)
GFR SERPL CREATININE-BSD FRML MDRD: 77 ML/MIN/1.73M2
GLUCOSE BLD-MCNC: 82 MG/DL (ref 70–105)
GLUCOSE BLDC GLUCOMTR-MCNC: 131 MG/DL (ref 70–99)
GLUCOSE BLDC GLUCOMTR-MCNC: 79 MG/DL (ref 70–99)
GLUCOSE BLDC GLUCOMTR-MCNC: 85 MG/DL (ref 70–99)
GLUCOSE BLDC GLUCOMTR-MCNC: 85 MG/DL (ref 70–99)
HCT VFR BLD AUTO: 41.3 % (ref 40–53)
HGB BLD-MCNC: 14 G/DL (ref 13.3–17.7)
MAGNESIUM SERPL-MCNC: 2.2 MG/DL (ref 1.9–2.7)
MCH RBC QN AUTO: 34.4 PG (ref 26.5–33)
MCHC RBC AUTO-ENTMCNC: 33.9 G/DL (ref 31.5–36.5)
MCV RBC AUTO: 102 FL (ref 78–100)
PHOSPHATE SERPL-MCNC: 3.9 MG/DL (ref 2.5–5)
PLATELET # BLD AUTO: 306 10E3/UL (ref 150–450)
POTASSIUM BLD-SCNC: 3.8 MMOL/L (ref 3.5–5.1)
POTASSIUM BLD-SCNC: 3.8 MMOL/L (ref 3.5–5.1)
PROT SERPL-MCNC: 5.9 G/DL (ref 6.4–8.9)
RBC # BLD AUTO: 4.07 10E6/UL (ref 4.4–5.9)
SODIUM SERPL-SCNC: 140 MMOL/L (ref 134–144)
WBC # BLD AUTO: 7.7 10E3/UL (ref 4–11)

## 2022-07-02 PROCEDURE — 99207 PR NO CHARGE LOS: CPT | Performed by: STUDENT IN AN ORGANIZED HEALTH CARE EDUCATION/TRAINING PROGRAM

## 2022-07-02 PROCEDURE — 84100 ASSAY OF PHOSPHORUS: CPT | Performed by: FAMILY MEDICINE

## 2022-07-02 PROCEDURE — 250N000013 HC RX MED GY IP 250 OP 250 PS 637: Performed by: FAMILY MEDICINE

## 2022-07-02 PROCEDURE — 99232 SBSQ HOSP IP/OBS MODERATE 35: CPT | Performed by: FAMILY MEDICINE

## 2022-07-02 PROCEDURE — 120N000001 HC R&B MED SURG/OB

## 2022-07-02 PROCEDURE — 250N000013 HC RX MED GY IP 250 OP 250 PS 637: Performed by: STUDENT IN AN ORGANIZED HEALTH CARE EDUCATION/TRAINING PROGRAM

## 2022-07-02 PROCEDURE — 97116 GAIT TRAINING THERAPY: CPT | Mod: GP

## 2022-07-02 PROCEDURE — 250N000013 HC RX MED GY IP 250 OP 250 PS 637: Performed by: INTERNAL MEDICINE

## 2022-07-02 PROCEDURE — 85014 HEMATOCRIT: CPT | Performed by: FAMILY MEDICINE

## 2022-07-02 PROCEDURE — 97530 THERAPEUTIC ACTIVITIES: CPT | Mod: GO

## 2022-07-02 PROCEDURE — 97530 THERAPEUTIC ACTIVITIES: CPT | Mod: GP

## 2022-07-02 PROCEDURE — 36415 COLL VENOUS BLD VENIPUNCTURE: CPT | Performed by: FAMILY MEDICINE

## 2022-07-02 PROCEDURE — 97535 SELF CARE MNGMENT TRAINING: CPT | Mod: GO

## 2022-07-02 PROCEDURE — 84132 ASSAY OF SERUM POTASSIUM: CPT | Performed by: FAMILY MEDICINE

## 2022-07-02 PROCEDURE — 83735 ASSAY OF MAGNESIUM: CPT | Performed by: FAMILY MEDICINE

## 2022-07-02 RX ORDER — QUETIAPINE FUMARATE 25 MG/1
25 TABLET, FILM COATED ORAL ONCE
Status: COMPLETED | OUTPATIENT
Start: 2022-07-02 | End: 2022-07-02

## 2022-07-02 RX ORDER — QUETIAPINE FUMARATE 25 MG/1
25 TABLET, FILM COATED ORAL EVERY 6 HOURS PRN
Status: DISCONTINUED | OUTPATIENT
Start: 2022-07-02 | End: 2022-07-08

## 2022-07-02 RX ORDER — DIAZEPAM 5 MG
5 TABLET ORAL ONCE
Status: COMPLETED | OUTPATIENT
Start: 2022-07-02 | End: 2022-07-02

## 2022-07-02 RX ADMIN — RIVAROXABAN 15 MG: 15 TABLET, FILM COATED ORAL at 21:27

## 2022-07-02 RX ADMIN — QUETIAPINE FUMARATE 25 MG: 25 TABLET ORAL at 15:40

## 2022-07-02 RX ADMIN — DIAZEPAM 5 MG: 5 TABLET ORAL at 02:37

## 2022-07-02 RX ADMIN — LISINOPRIL 20 MG: 20 TABLET ORAL at 10:08

## 2022-07-02 RX ADMIN — FOLIC ACID 1 MG: 1 TABLET ORAL at 10:08

## 2022-07-02 RX ADMIN — FAMOTIDINE 20 MG: 20 TABLET ORAL at 21:27

## 2022-07-02 RX ADMIN — Medication 1 TABLET: at 10:08

## 2022-07-02 RX ADMIN — FAMOTIDINE 20 MG: 20 TABLET ORAL at 10:08

## 2022-07-02 RX ADMIN — ACETAMINOPHEN 1000 MG: 500 TABLET ORAL at 21:27

## 2022-07-02 RX ADMIN — RIVAROXABAN 15 MG: 15 TABLET, FILM COATED ORAL at 07:28

## 2022-07-02 RX ADMIN — QUETIAPINE FUMARATE 25 MG: 25 TABLET ORAL at 15:05

## 2022-07-02 RX ADMIN — THIAMINE HCL TAB 100 MG 300 MG: 100 TAB at 10:07

## 2022-07-02 RX ADMIN — METOPROLOL TARTRATE 25 MG: 25 TABLET, FILM COATED ORAL at 10:08

## 2022-07-02 RX ADMIN — NYSTATIN: 100000 POWDER TOPICAL at 10:06

## 2022-07-02 RX ADMIN — METOPROLOL TARTRATE 25 MG: 25 TABLET, FILM COATED ORAL at 21:27

## 2022-07-02 RX ADMIN — QUETIAPINE FUMARATE 100 MG: 100 TABLET ORAL at 21:27

## 2022-07-02 RX ADMIN — NICOTINE 1 PATCH: 14 PATCH, EXTENDED RELEASE TRANSDERMAL at 15:04

## 2022-07-02 RX ADMIN — NYSTATIN: 100000 POWDER TOPICAL at 21:30

## 2022-07-02 ASSESSMENT — ACTIVITIES OF DAILY LIVING (ADL)
ADLS_ACUITY_SCORE: 50
ADLS_ACUITY_SCORE: 46
ADLS_ACUITY_SCORE: 50
ADLS_ACUITY_SCORE: 50
ADLS_ACUITY_SCORE: 46
ADLS_ACUITY_SCORE: 50

## 2022-07-02 NOTE — PROGRESS NOTES
[facility]    Medicine Progress Note - Hospitalist Service    Date of Admission:  6/17/2022    Assessment & Plan                        Antonio Rutherford is a 72 year old male admitted on 6/17/2022. He presented from the [facility] for concerns of possible kidney stone versus sepsis.     Patient was admitted to the [facility] 7 days PTA.  He is a daily drinker of whiskey and was treated for alcohol withdrawal at the [facility] and then started on Bactrim for possible UTI 4 days PTA.  He had extremely poor oral intake PTA (20 oz in 7d per [facility] nurse report).     Patient was admitted treated with thiamine, folate and IV fluids, strict I's and O's and close monitoring.  Developed worsening confusion and somnolence and concern to protect his airway.  Got intubated on 6/21/2022, extubated 6/24/2022.    For the past few days has had slow daily improvement in his mental status and functionality.    This morning he is somnolent likely due to medication received overnight for delirium.       Alcohol dependence with withdrawal delirium (H):   Assessment: Resolved.  Sedated and intubated in the ICU on 6/21/22 for somnolence, extubated on 6/24/2022. Ammonia level was normal, ABG did not show significant CO2 retention, head CT and brain MRI neg for acute findings. Repeat CT of the head negative.    Required Seroquel, Zyprexa and Valium last night due to agitation.  -Discontinued CIWA on 6/27  -Minimize sedating medication  -In discussion with social work patient does need to return to [facility] on discharge.  There is court proceedings determining if he would be allowed to go to skilled nursing facility next week.     Encephalopathy, metabolic.  CT and MRI head/brain negative for acute findings. Likely due to ICU acquired delirium at this time.  Cannot exclude encephalopathy , he should be out of the timeframe for withdrawal.    Has had ongoing improvement but is somnolent today likely due to medications overnight.  I suspect he  will clear as the day goes.  Plan:   -Delirium order set  -Appreciate PT/OT input for dispo needs     Stage 1 pressure ulcer of buttocks with area of unstageable eschar  Heel pressure ulcer stage 1. Left foot.  Present on admission  -offload   -Air mattress  -Continue Escobedo to minimize skin breakdown     Acute PE, POA: Diagnosed 6/17, likely provoked from immobility and acute illness.  -Initially started on lovenox 1.5mg/kg/d now able to take oral medication  -Continue Xarelto for PE treatment     Aortic valve echodensity: Probable chronic calcific nodule, could also represent small clot, unlikely to represent infective endocarditis process with initial negative blood cultures.  No evidence of CVA given MRI findings and at this point given how critically ill he has been will anticoagulate as above, monitor for clinical improvement and consider repeat ELLEN as an outpatient to ensure stability/resolution.  -Follow-up repeat blood cultures NG x5d     CHARLOTTE due to ATN (acute tubular necrosis) (H): Resolved, creatinine peaked at 2.7, likely from prerenal component from hypovolemia and infection and possible ATN from Bactrim/ibuprofen prior to admission.    Plan:  -Monitor daily   -avoid NSAIDs and nephrotoxins     Community-acquired versus aspiration pneumonia: Possibly contributing to need for intubation with possible component of aspiration given alcohol abuse  -Completed 7-day course of Zosyn     UTI. POA: Resolved.  Empirically treated with Bactrim, Urine culture negative.  No growth in urine culture. Blood cultures negative.      Essential hypertension: Stable.  -Continue as needed hydralazine and-labetalol  -Continue home ACE  -Continue metoprolol 25 mg twice daily     Severe protein calorie malnutrition: Generally poor oral intake.    -Inpatient dietary eval      Sinus tachycardia: Resolved, likely from withdrawal.  Got amiodarone x24 hours. Now HR regular and fairly well controlled, moderate elevation especially  with agitation.   Plan:   -Discontinue telemetry      diarrhea: Resolved  -C. difficile and stool culture negative.    -Imodium as needed     Smoker.  No wheezing or contribution of COPD exacerbation during his stay      Hypokalemia  Likely from poor oral intake.  Improved.  Replace per protocol-    Malnutrition  Prealbumin has been stable at 20.  Patient needs assistance with eating at this time.  Continue with high-calorie supplements.                    Diet: Minced & Moist Diet (level 5) Thin Liquids (level 0)  Snacks/Supplements Adult: Ensure Enlive; With Meals    DVT Prophylaxis: Enoxaparin (Lovenox) SQ  Escobedo Catheter: PRESENT, indication: Wound Healing, Strict 1-2 Hour I&O  Central Lines: None  Cardiac Monitoring: None  Code Status: Full Code      Disposition Plan    to long term when medically stable     The patient's care was discussed with the Patient.    Karl Menjivar MD  Hospitalist Service  [facility]  Securely message with the Vocera Web Console (learn more here)  Text page via [employer] Paging/Directory         Clinically Significant Risk Factors Present on Admission                      ______________________________________________________________________    Interval History   Patient is somnolent but does awaken and tells me he has no pain.  Denies any shortness of breath.  Tells me he is not hungry.  Wants to sleep.  He knows his name and he knows he is in the hospital but cannot recall why.  Nursing report was reviewed.  Patient had a lot of delirium and agitation overnight.  Required a dose of Zyprexa and subsequently Valium.  Also had Seroquel at bedtime.    Data reviewed today: I reviewed all medications, new labs and imaging results over the last 24 hours. I personally reviewed no images or EKG's today.    Physical Exam   Vital Signs: Temp: 97.2  F (36.2  C) Temp src: Tympanic BP: 138/82 Pulse: 82   Resp: 18 SpO2: 91 % O2 Device: None (Room air)    Weight: 190 lbs 4.11  oz  Constitutional: Somnolent but awakens to voice and answers questions.  Respiratory: Clear to auscultation bilaterally  Cardiovascular: Regular rate and rhythm.  No edema  GI: Abdomen soft, nontender  Skin: Stage I pressure ulcer persists with no interval worsening  Musculoskeletal: Able to move all extremities.  Generalized weakness  Neuropsychiatric: Patient able to follow commands.    Data   Recent Labs   Lab 07/02/22  1108 07/02/22  0735 07/02/22  0713 07/01/22  2234 06/30/22  2202 06/30/22  1448 06/30/22  1416 06/30/22  0559 06/28/22  0704 06/28/22  0620 06/27/22  1406 06/27/22  0536   WBC  --   --   --   --   --  12.8*  --   --   --  9.2  --  12.3*   HGB  --   --   --   --   --  14.6  --   --   --  13.3  --  14.0   MCV  --   --   --   --   --  101*  --   --   --  103*  --  102*   PLT  --   --   --   --   --  323  --   --   --  289  --  309   INR  --   --   --   --   --   --   --   --   --  1.13  --   --    NA  --   --   --   --   --  139  --   --   --  138  --  138   POTASSIUM  --  3.8  --   --   --  3.9  --  3.9   < > 3.4*   < > 3.3*  3.3*   CHLORIDE  --   --   --   --   --  101  --   --   --  103  --  101   CO2  --   --   --   --   --  26  --   --   --  23  --  26   BUN  --   --   --   --   --  21  --   --   --  18  --  15   CR  --   --   --   --   --  1.07  --   --   --  0.82  --  1.02   ANIONGAP  --   --   --   --   --  12  --   --   --  12  --  11   PAWAN  --   --   --   --   --  9.1  --   --   --  8.7  --  9.0   *  --  79 97   < > 90   < >  --    < > 84  --  90   ALBUMIN  --   --   --   --   --  3.7  --   --   --   --   --  3.3*   PROTTOTAL  --   --   --   --   --  6.6  --   --   --   --   --  6.2*   BILITOTAL  --   --   --   --   --  0.9  --   --   --   --   --  1.1*   ALKPHOS  --   --   --   --   --  47  --   --   --   --   --  42   ALT  --   --   --   --   --  73*  --   --   --   --   --  37   AST  --   --   --   --   --  55*  --   --   --   --   --  38    < > = values in this interval not  displayed.     No results found for this or any previous visit (from the past 24 hour(s)).

## 2022-07-02 NOTE — PROGRESS NOTES
"Pt is having frequent loose, gelatinous stools that are black and brown in color. Pt skin breakdown on buttocks is worsening. Affected area cleaned, barrier cream applied each episode.     Pt is disorientated intermittently. Able to be re-orientated and redirected but briefly. Pt is refusing to stay in bed. Staff will redirect, pt will cooperate, then try to get out of bed. Sit to stand lift used during transfers to bed side commode each occasion. Pt unstable up in stand by lifting feet off stand, not bearing weight on legs during transfers. Pt denies any pain during assessment. Bed alarms on.     Air mattress applied to bed. Side rails with padding up for seizure precautions. Pt currently using bed pan. Pt offered food, but refused. Oral intake encouraged. Pt cooperative with the offers for fluids. Will continue to monitor.     BP (!) 146/70   Pulse 95   Temp 98.7  F (37.1  C) (Tympanic)   Resp 16   Ht 1.778 m (5' 10\")   Wt 92.5 kg (204 lb)   SpO2 92%   BMI 29.27 kg/m      Venice Workman RN on 7/2/2022 at 12:30 AM      "

## 2022-07-02 NOTE — PLAN OF CARE
"VSS and recorded. Patient up for shower this am, nails cut to promote safety for staff and reduce risk of skin tear with scratching. Patient alert to self, statements about location and time are not realistic. Patient up in chair and to bedside commode three times this shift. Increase agitation at 1505, unable to redirect patient with repositioning, reorientation, diversion or calling wife. Medicated with Seroquel 25 mg per MAR. Patient rested for about half hour and then became agitated and swinging at staff. Oncoming nurse gave one time dose of Seroquel 25 mg per MAR.  Problem: Plan of Care - These are the overarching goals to be used throughout the patient stay.    Goal: Plan of Care Review/Shift Note  Description: The Plan of Care Review/Shift note should be completed every shift.  The Outcome Evaluation is a brief statement about your assessment that the patient is improving, declining, or no change.  This information will be displayed automatically on your shift note.  Outcome: Ongoing, Not Progressing  Goal: Patient-Specific Goal (Individualized)  Description: You can add care plan individualizations to a care plan. Examples of Individualization might be:  \"Parent requests to be called daily at 9am for status\", \"I have a hard time hearing out of my right ear\", or \"Do not touch me to wake me up as it startles me\".  Outcome: Ongoing, Not Progressing  Goal: Absence of Hospital-Acquired Illness or Injury  Outcome: Ongoing, Not Progressing  Intervention: Identify and Manage Fall Risk  Recent Flowsheet Documentation  Taken 7/2/2022 1439 by Jaspreet Craft RN  Safety Promotion/Fall Prevention:   bed alarm on   clutter free environment maintained   fall prevention program maintained   increased rounding and observation   increase visualization of patient   lighting adjusted   nonskid shoes/slippers when out of bed   patient and family education   room door open   room near nurse's station   room organization " consistent   safety round/check completed  Intervention: Prevent and Manage VTE (Venous Thromboembolism) Risk  Recent Flowsheet Documentation  Taken 7/2/2022 0839 by Jaspreet Craft RN  VTE Prevention/Management: (xeralto) other (see comments)  Activity Management: activity adjusted per tolerance  Intervention: Prevent Infection  Recent Flowsheet Documentation  Taken 7/2/2022 0839 by Jaspreet Craft RN  Infection Prevention:   hand hygiene promoted   rest/sleep promoted   single patient room provided  Goal: Optimal Comfort and Wellbeing  Outcome: Ongoing, Not Progressing  Intervention: Provide Person-Centered Care  Recent Flowsheet Documentation  Taken 7/2/2022 0839 by Jaspreet Craft RN  Trust Relationship/Rapport:   care explained   choices provided   questions answered   empathic listening provided   emotional support provided   reassurance provided   thoughts/feelings acknowledged  Goal: Readiness for Transition of Care  Outcome: Ongoing, Not Progressing     Problem: Fall Injury Risk  Goal: Absence of Fall and Fall-Related Injury  Outcome: Ongoing, Not Progressing  Intervention: Identify and Manage Contributors  Recent Flowsheet Documentation  Taken 7/2/2022 0839 by Jaspreet Craft RN  Medication Review/Management: medications reviewed  Intervention: Promote Injury-Free Environment  Recent Flowsheet Documentation  Taken 7/2/2022 0839 by Jaspreet Craft RN  Safety Promotion/Fall Prevention:   bed alarm on   clutter free environment maintained   fall prevention program maintained   increased rounding and observation   increase visualization of patient   lighting adjusted   nonskid shoes/slippers when out of bed   patient and family education   room door open   room near nurse's station   room organization consistent   safety round/check completed     Problem: Behavioral Health Comorbidity  Goal: Maintenance of Behavioral Health Symptom Control  Outcome: Ongoing, Not Progressing  Intervention:  Maintain Behavioral Health Symptom Control  Recent Flowsheet Documentation  Taken 7/2/2022 0839 by Jaspreet Craft, RN  Medication Review/Management: medications reviewed     Problem: Alcohol Withdrawal  Goal: Alcohol Withdrawal Symptom Control  Outcome: Ongoing, Not Progressing     Problem: Oral Intake Inadequate  Goal: Optimal Oral Intake  Outcome: Ongoing, Not Progressing   Goal Outcome Evaluation:

## 2022-07-02 NOTE — PROGRESS NOTES
Constant agitation trying to get up out of bed. Bed alarm off. Security called.   No improvement with quetiapine. Will give 5mg versed one time to assist in calming and helping sleep as is here for ETOH related problems though should be done withdrawing.     Mike Burris MD on 7/2/2022 at 2:28 AM

## 2022-07-02 NOTE — PLAN OF CARE
"Goal Outcome Evaluation:    Plan of Care Reviewed With: patient     Overall Patient Progress: no change        Problem: Plan of Care - These are the overarching goals to be used throughout the patient stay.    Goal: Plan of Care Review/Shift Note  Description: The Plan of Care Review/Shift note should be completed every shift.  The Outcome Evaluation is a brief statement about your assessment that the patient is improving, declining, or no change.  This information will be displayed automatically on your shift note.  Outcome: Ongoing, Not Progressing  Flowsheets (Taken 7/2/2022 0507)  Plan of Care Reviewed With: patient  Overall Patient Progress: no change  Goal: Patient-Specific Goal (Individualized)  Description: You can add care plan individualizations to a care plan. Examples of Individualization might be:  \"Parent requests to be called daily at 9am for status\", \"I have a hard time hearing out of my right ear\", or \"Do not touch me to wake me up as it startles me\".  Outcome: Ongoing, Not Progressing  Goal: Absence of Hospital-Acquired Illness or Injury  Outcome: Ongoing, Not Progressing  Intervention: Identify and Manage Fall Risk  Recent Flowsheet Documentation  Taken 7/2/2022 0150 by Venice Workman, RN  Safety Promotion/Fall Prevention:   bed alarm on   clutter free environment maintained   fall prevention program maintained   increased rounding and observation   increase visualization of patient   lighting adjusted   nonskid shoes/slippers when out of bed   patient and family education   room door open   room near nurse's station   room organization consistent   safety round/check completed  Taken 7/1/2022 1838 by Venice Workman, RN  Safety Promotion/Fall Prevention:   bed alarm on   clutter free environment maintained   fall prevention program maintained   increased rounding and observation   increase visualization of patient   lighting adjusted   nonskid shoes/slippers when out of bed   patient and " family education   room door open   room near nurse's station   room organization consistent   safety round/check completed  Intervention: Prevent and Manage VTE (Venous Thromboembolism) Risk  Recent Flowsheet Documentation  Taken 7/2/2022 0150 by Venice Workman RN  VTE Prevention/Management: (xeralto) other (see comments)  Taken 7/1/2022 1838 by Venice Workman RN  VTE Prevention/Management: (xeralto) other (see comments)  Intervention: Prevent Infection  Recent Flowsheet Documentation  Taken 7/2/2022 0150 by Venice Workman RN  Infection Prevention:   hand hygiene promoted   rest/sleep promoted   single patient room provided  Taken 7/1/2022 1838 by Venice Workman RN  Infection Prevention:   hand hygiene promoted   rest/sleep promoted   single patient room provided  Goal: Optimal Comfort and Wellbeing  Outcome: Ongoing, Not Progressing  Intervention: Provide Person-Centered Care  Recent Flowsheet Documentation  Taken 7/2/2022 0150 by Venice Workman RN  Trust Relationship/Rapport:   care explained   choices provided   questions answered   empathic listening provided   emotional support provided   reassurance provided   thoughts/feelings acknowledged  Taken 7/1/2022 1838 by Venice Workman RN  Trust Relationship/Rapport:   care explained   choices provided   questions answered  Goal: Readiness for Transition of Care  Outcome: Ongoing, Not Progressing     Problem: Fall Injury Risk  Goal: Absence of Fall and Fall-Related Injury  Outcome: Ongoing, Not Progressing  Intervention: Identify and Manage Contributors  Recent Flowsheet Documentation  Taken 7/2/2022 0150 by Venice Workman RN  Medication Review/Management: medications reviewed  Taken 7/1/2022 1838 by Venice Workman RN  Medication Review/Management: medications reviewed  Intervention: Promote Injury-Free Environment  Recent Flowsheet Documentation  Taken 7/2/2022 0150 by Venice Workman RN  Safety Promotion/Fall Prevention:   bed alarm on    clutter free environment maintained   fall prevention program maintained   increased rounding and observation   increase visualization of patient   lighting adjusted   nonskid shoes/slippers when out of bed   patient and family education   room door open   room near nurse's station   room organization consistent   safety round/check completed  Taken 7/1/2022 1838 by Venice Workman, RN  Safety Promotion/Fall Prevention:   bed alarm on   clutter free environment maintained   fall prevention program maintained   increased rounding and observation   increase visualization of patient   lighting adjusted   nonskid shoes/slippers when out of bed   patient and family education   room door open   room near nurse's station   room organization consistent   safety round/check completed     Problem: Behavioral Health Comorbidity  Goal: Maintenance of Behavioral Health Symptom Control  Outcome: Ongoing, Not Progressing  Intervention: Maintain Behavioral Health Symptom Control  Recent Flowsheet Documentation  Taken 7/2/2022 0150 by Venice Workman, RN  Medication Review/Management: medications reviewed  Taken 7/1/2022 1838 by Venice Workman, RN  Medication Review/Management: medications reviewed     Problem: Alcohol Withdrawal  Goal: Alcohol Withdrawal Symptom Control  Outcome: Ongoing, Not Progressing     Problem: Oral Intake Inadequate  Goal: Optimal Oral Intake  Outcome: Ongoing, Not Progressing

## 2022-07-02 NOTE — PROGRESS NOTES
Assumed care 1500.      SAFETY CHECKLIST  ID Bands and Risk clasps correct and in place (DNR, Fall risk, Allergy, Latex, Limb):  Yes  All Lines Reconciled and labeled correctly: Yes  Whiteboard updated:Yes  Environmental interventions (bed/chair alarm on, call light, side rails, restraints, sitter....): Yes  Verify Tele #:

## 2022-07-02 NOTE — PROGRESS NOTES
07/02/22 1200   Signing Clinician's Name / Credentials   Signing clinician's name / credentials Ginger Hdz, PT   Quick Adds   Rehab Discipline PT   Functional Transfer Training   Symptoms Noted During/After Treatment fatigue   Treatment Detail Pt able to transfer supine to sit with v.c and min A, Pt transferred sit to stand with min A of 2   Gait Training   Distance in Feet (Required for LE Total Joints) 3   Treatment Detail Pt had LOB with backing up and required mod A to regain, pt took short shuffling steps.v.c to  feet   [place] Level (Gait Training) minimum assist (75% patient effort)   Physical Assistance Level (Gait Training) verbal cues   Assistive Device (Gait Training) rolling walker   Gait Analysis Deviations decreased step length   Impairments (Gait Analysis/Training) balance impaired   PT Discharge Planning   PT Discharge Recommendation (DC Rec) Transitional Care Facility   PT Rationale for DC Rec Pt requires A of 2 for transfer and ambualtion and may require short term rehab to increase functional mobility prior to retrun home, but may not qualify due to criminal history   PT Brief overview of current status min to mod a of 2 for mobility   Additional Documentation   PT Plan Continue PT   Total Session Time   Total Session Time (minutes) 30 minutes

## 2022-07-03 ENCOUNTER — APPOINTMENT (OUTPATIENT)
Dept: PHYSICAL THERAPY | Facility: OTHER | Age: 72
DRG: 673 | End: 2022-07-03
Payer: MEDICARE

## 2022-07-03 ENCOUNTER — APPOINTMENT (OUTPATIENT)
Dept: OCCUPATIONAL THERAPY | Facility: OTHER | Age: 72
DRG: 673 | End: 2022-07-03
Payer: MEDICARE

## 2022-07-03 LAB
ALBUMIN SERPL-MCNC: 3.2 G/DL (ref 3.5–5.7)
ALP SERPL-CCNC: 40 U/L (ref 34–104)
ALT SERPL W P-5'-P-CCNC: 48 U/L (ref 7–52)
ANION GAP SERPL CALCULATED.3IONS-SCNC: 11 MMOL/L (ref 3–14)
AST SERPL W P-5'-P-CCNC: 30 U/L (ref 13–39)
BILIRUB SERPL-MCNC: 0.6 MG/DL (ref 0.3–1)
BUN SERPL-MCNC: 27 MG/DL (ref 7–25)
CALCIUM SERPL-MCNC: 8.5 MG/DL (ref 8.6–10.3)
CHLORIDE BLD-SCNC: 103 MMOL/L (ref 98–107)
CO2 SERPL-SCNC: 21 MMOL/L (ref 21–31)
CREAT SERPL-MCNC: 1.16 MG/DL (ref 0.7–1.3)
ERYTHROCYTE [DISTWIDTH] IN BLOOD BY AUTOMATED COUNT: 13.4 % (ref 10–15)
GFR SERPL CREATININE-BSD FRML MDRD: 66 ML/MIN/1.73M2
GLUCOSE BLD-MCNC: 85 MG/DL (ref 70–105)
GLUCOSE BLDC GLUCOMTR-MCNC: 69 MG/DL (ref 70–99)
GLUCOSE BLDC GLUCOMTR-MCNC: 71 MG/DL (ref 70–99)
GLUCOSE BLDC GLUCOMTR-MCNC: 81 MG/DL (ref 70–99)
HCT VFR BLD AUTO: 43.3 % (ref 40–53)
HGB BLD-MCNC: 13.7 G/DL (ref 13.3–17.7)
MAGNESIUM SERPL-MCNC: 2.1 MG/DL (ref 1.9–2.7)
MCH RBC QN AUTO: 34.8 PG (ref 26.5–33)
MCHC RBC AUTO-ENTMCNC: 31.6 G/DL (ref 31.5–36.5)
MCV RBC AUTO: 110 FL (ref 78–100)
PHOSPHATE SERPL-MCNC: 3.8 MG/DL (ref 2.5–5)
PLATELET # BLD AUTO: 225 10E3/UL (ref 150–450)
POTASSIUM BLD-SCNC: 3.5 MMOL/L (ref 3.5–5.1)
PROT SERPL-MCNC: 6.1 G/DL (ref 6.4–8.9)
RBC # BLD AUTO: 3.94 10E6/UL (ref 4.4–5.9)
SODIUM SERPL-SCNC: 135 MMOL/L (ref 134–144)
WBC # BLD AUTO: 7.1 10E3/UL (ref 4–11)

## 2022-07-03 PROCEDURE — 250N000013 HC RX MED GY IP 250 OP 250 PS 637: Performed by: INTERNAL MEDICINE

## 2022-07-03 PROCEDURE — 84100 ASSAY OF PHOSPHORUS: CPT | Performed by: FAMILY MEDICINE

## 2022-07-03 PROCEDURE — 83735 ASSAY OF MAGNESIUM: CPT | Performed by: FAMILY MEDICINE

## 2022-07-03 PROCEDURE — 97530 THERAPEUTIC ACTIVITIES: CPT | Mod: GP

## 2022-07-03 PROCEDURE — 250N000013 HC RX MED GY IP 250 OP 250 PS 637: Performed by: FAMILY MEDICINE

## 2022-07-03 PROCEDURE — 36600 WITHDRAWAL OF ARTERIAL BLOOD: CPT | Performed by: FAMILY MEDICINE

## 2022-07-03 PROCEDURE — 250N000013 HC RX MED GY IP 250 OP 250 PS 637: Performed by: STUDENT IN AN ORGANIZED HEALTH CARE EDUCATION/TRAINING PROGRAM

## 2022-07-03 PROCEDURE — 99232 SBSQ HOSP IP/OBS MODERATE 35: CPT | Performed by: FAMILY MEDICINE

## 2022-07-03 PROCEDURE — 97530 THERAPEUTIC ACTIVITIES: CPT | Mod: GO

## 2022-07-03 PROCEDURE — 120N000001 HC R&B MED SURG/OB

## 2022-07-03 PROCEDURE — 36415 COLL VENOUS BLD VENIPUNCTURE: CPT | Performed by: FAMILY MEDICINE

## 2022-07-03 PROCEDURE — 85027 COMPLETE CBC AUTOMATED: CPT | Performed by: FAMILY MEDICINE

## 2022-07-03 PROCEDURE — 80053 COMPREHEN METABOLIC PANEL: CPT | Performed by: FAMILY MEDICINE

## 2022-07-03 RX ORDER — LANOLIN ALCOHOL/MO/W.PET/CERES
3 CREAM (GRAM) TOPICAL AT BEDTIME
Status: DISCONTINUED | OUTPATIENT
Start: 2022-07-03 | End: 2022-07-06

## 2022-07-03 RX ADMIN — CLONIDINE HYDROCHLORIDE 0.1 MG: 0.1 TABLET ORAL at 16:08

## 2022-07-03 RX ADMIN — NICOTINE 1 PATCH: 14 PATCH, EXTENDED RELEASE TRANSDERMAL at 08:32

## 2022-07-03 RX ADMIN — NYSTATIN: 100000 POWDER TOPICAL at 20:41

## 2022-07-03 RX ADMIN — NYSTATIN: 100000 POWDER TOPICAL at 09:14

## 2022-07-03 RX ADMIN — ACETAMINOPHEN 1000 MG: 500 TABLET ORAL at 20:31

## 2022-07-03 RX ADMIN — RIVAROXABAN 15 MG: 15 TABLET, FILM COATED ORAL at 18:15

## 2022-07-03 RX ADMIN — Medication 1 TABLET: at 09:02

## 2022-07-03 RX ADMIN — LOPERAMIDE HYDROCHLORIDE 2 MG: 2 CAPSULE ORAL at 09:02

## 2022-07-03 RX ADMIN — FAMOTIDINE 20 MG: 20 TABLET ORAL at 09:02

## 2022-07-03 RX ADMIN — THIAMINE HCL TAB 100 MG 300 MG: 100 TAB at 09:02

## 2022-07-03 RX ADMIN — QUETIAPINE FUMARATE 100 MG: 100 TABLET ORAL at 20:31

## 2022-07-03 RX ADMIN — RIVAROXABAN 15 MG: 15 TABLET, FILM COATED ORAL at 09:02

## 2022-07-03 RX ADMIN — LISINOPRIL 20 MG: 20 TABLET ORAL at 09:02

## 2022-07-03 RX ADMIN — MELATONIN TAB 3 MG 3 MG: 3 TAB at 20:31

## 2022-07-03 RX ADMIN — FOLIC ACID 1 MG: 1 TABLET ORAL at 09:02

## 2022-07-03 RX ADMIN — METOPROLOL TARTRATE 25 MG: 25 TABLET, FILM COATED ORAL at 20:32

## 2022-07-03 RX ADMIN — QUETIAPINE FUMARATE 25 MG: 25 TABLET ORAL at 16:20

## 2022-07-03 RX ADMIN — FAMOTIDINE 20 MG: 20 TABLET ORAL at 20:31

## 2022-07-03 RX ADMIN — QUETIAPINE FUMARATE 25 MG: 25 TABLET ORAL at 09:02

## 2022-07-03 RX ADMIN — ACETAMINOPHEN 1000 MG: 500 TABLET ORAL at 03:02

## 2022-07-03 RX ADMIN — METOPROLOL TARTRATE 25 MG: 25 TABLET, FILM COATED ORAL at 09:02

## 2022-07-03 RX ADMIN — LOPERAMIDE HYDROCHLORIDE 2 MG: 2 CAPSULE ORAL at 03:02

## 2022-07-03 ASSESSMENT — ACTIVITIES OF DAILY LIVING (ADL)
ADLS_ACUITY_SCORE: 52

## 2022-07-03 NOTE — PROVIDER NOTIFICATION
07/03/22 1603   Vital Signs   BP (!) 185/82     PRN Clonidine administered. BP improved to 126/73

## 2022-07-03 NOTE — PROGRESS NOTES
"   07/03/22 1300   Signing Clinician's Name / Credentials   Signing clinician's name / credentials Queenie Benito, OTR/L   Quick Adds   Rehab Discipline OT   Therapeutic Activity   Symptoms Noted During/After Treatment Fatigue;Increased pain   Treatment Detail bed mobility: scooting, rolling left and right with mod assist, raising head off pillow independently   ADL Training   Patient Response Comments Patient declined participation in self cares   OT Discharge Planning   OT Discharge Recommendation (DC Rec) Transitional Care Facility   OT Rationale for DC Rec Pt needs assist of 2 for all mobility and assist with all self cares, needs further therapies to gain independence and strength needed for safe management of ADL and mobility   OT Brief overview of current status Pt participated more today in bed mobility, however declined getting out of bed, reporting pain \"everywhere\"   Additional Documentation   OT Plan OT to continue   Total Session Time   Total Session Time (minutes) 20 minutes     "

## 2022-07-03 NOTE — PLAN OF CARE
"BP (!) 151/80 (BP Location: Right arm, Patient Position: Semi-Fitzgerald's, Cuff Size: Adult Regular)   Pulse 70   Temp 97  F (36.1  C) (Tympanic)   Resp 16   Ht 1.778 m (5' 10\")   Wt 86.3 kg (190 lb 4.1 oz)   SpO2 96%   BMI 27.30 kg/m     Alert, oriented to self only. Patient was restless, agitated,  attempting to crawl out of bed, yelling out for wife a couple of times this shift- PRN Seroquel x2, 0900&1630. Patient rested in between doses, awake and content- behaviors improved. Patient spoke with wife over the phone. Poor appetite.  Encouraging fluid intake. Escobedo catheter in place-patent. Anya color UO.  Ambulated to bathroom with assist of 2, T-belt, and walker. Dressing changed to buttocks.    Problem: Plan of Care - These are the overarching goals to be used throughout the patient stay.    Goal: Patient-Specific Goal (Individualized)  Air  Outcome: Ongoing, Progressing   Goal Outcome Evaluation:    Plan of Care Reviewed With: patient     Overall Patient Progress: no change           "

## 2022-07-03 NOTE — PROGRESS NOTES
[facility]    Medicine Progress Note - Hospitalist Service    Date of Admission:  6/17/2022    Assessment & Plan                                   Antonio Rutherford is a 72 year old male admitted on 6/17/2022. He presented from the shelter for concerns of possible kidney stone versus sepsis.     Patient was admitted to the shelter 7 days PTA.  He is a daily drinker of whiskey and was treated for alcohol withdrawal at the shelter and then started on Bactrim for possible UTI 4 days PTA.  He had extremely poor oral intake PTA (20 oz in 7d per shelter nurse report).     Patient was admitted treated with thiamine, folate and IV fluids, strict I's and O's and close monitoring.  Developed worsening confusion and somnolence and concern to protect his airway.  Got intubated on 6/21/2022, extubated 6/24/2022.    For the past few days has had slow daily improvement in his mental status and functionality.    Patient is no longer somnolent but is frequently agitated but redirectable.       Alcohol dependence with withdrawal delirium (H):   Assessment: Resolved.  Sedated and intubated in the ICU on 6/21/22 for somnolence, extubated on 6/24/2022. Ammonia level was normal, ABG did not show significant CO2 retention, head CT and brain MRI neg for acute findings. Repeat CT of the head negative.    Utilized Seroquel only last night.  -Discontinued CIWA on 6/27  -Minimize sedating medication  -In discussion with social work patient does need to return to shelter on discharge.  There is court proceedings determining if he would be allowed to go to skilled nursing facility next week.     Encephalopathy, metabolic.  CT and MRI head/brain negative for acute findings. Likely due to ICU acquired delirium at this time.  Cannot exclude encephalopathy , he should be out of the timeframe for withdrawal.    Has had ongoing improvement but is somnolent today likely due to medications overnight.  I suspect he will clear as the day goes.  Plan:    -Delirium order set  -Appreciate PT/OT input for dispo needs     Stage 1 pressure ulcer of buttocks with area of unstageable eschar  Heel pressure ulcer stage 1. Left foot.  Present on admission  -offload   -Air mattress  -Continue Escobedo to minimize skin breakdown     Acute PE, POA: Diagnosed 6/17, likely provoked from immobility and acute illness.  -Initially started on lovenox 1.5mg/kg/d now able to take oral medication  -Continue Xarelto for PE treatment     Aortic valve echodensity: Probable chronic calcific nodule, could also represent small clot, unlikely to represent infective endocarditis process with initial negative blood cultures.  No evidence of CVA given MRI findings and at this point given how critically ill he has been will anticoagulate as above, monitor for clinical improvement and consider repeat ELLEN as an outpatient to ensure stability/resolution.  -Follow-up repeat blood cultures showed no growth     CHARLOTTE due to ATN (acute tubular necrosis) (H): Resolved, creatinine peaked at 2.7, likely from prerenal component from hypovolemia and infection and possible ATN from Bactrim/ibuprofen prior to admission.    Plan:  -CMP pending for today  -avoid NSAIDs and nephrotoxins     Community-acquired versus aspiration pneumonia: Possibly contributing to need for intubation with possible component of aspiration given alcohol abuse  -Completed 7-day course of Zosyn     UTI. POA: Resolved.  Empirically treated with Bactrim, Urine culture negative.  No growth in urine culture. Blood cultures negative.      Essential hypertension: Stable.  -Continue as needed hydralazine and-labetalol  -Continue home ACE  -Continue metoprolol 25 mg twice daily     Severe protein calorie malnutrition: Generally poor oral intake.    -Inpatient dietary eval      Sinus tachycardia: Resolved, likely from withdrawal.  Got amiodarone x24 hours. Now HR regular and fairly well controlled, moderate elevation especially with  "agitation.   Plan:   -Discontinue telemetry      diarrhea: Resolved  -C. difficile and stool culture negative.    -Imodium as needed     Smoker.  No wheezing or contribution of COPD exacerbation during his stay      Hypokalemia  Likely from poor oral intake.  Improved.  Replace per protocol-    Malnutrition  Prealbumin has been stable at 20.  Patient needs assistance with eating at this time.  Continue with high-calorie supplements.  Recheck prealbumin tuesday                      Diet: Minced & Moist Diet (level 5) Thin Liquids (level 0)  Snacks/Supplements Adult: Ensure Enlive; With Meals    DVT Prophylaxis: DOAC  Escobedo Catheter: PRESENT, indication: Wound Healing, Strict 1-2 Hour I&O  Central Lines: None  Cardiac Monitoring: None  Code Status: Full Code      Disposition Plan    patient has had minimal functional improvement over the past 2 days.  At this point he cannot safely go back to half-way without hourly nursing care     The patient's care was discussed with the Patient.    Karl Menjivar MD  Hospitalist Service  [facility]  Securely message with the Vocera Web Console (learn more here)  Text page via [employer] Paging/Directory         Clinically Significant Risk Factors Present on Admission                      ______________________________________________________________________    Interval History   Patient had a better night.  Was still agitated but was able to be redirected.  We utilize Seroquel only and that seemed to help him sleep.  This morning he is still sleeping but easily awakens and is not somnolent.  He denies any pain.  He denies any shortness of breath.  He knows who he is and that he is in the hospital but he thinks he is here for \"messing with that kid\".  He does not recall being in the ICU or going through alcohol withdrawal.  He has been much easier to redirect than he was the prior today but still requires constant nursing supervision.    Data reviewed today: I " reviewed all medications, new labs and imaging results over the last 24 hours. I personally reviewed no images or EKG's today.    Physical Exam   Vital Signs: Temp: 96.9  F (36.1  C) Temp src: Tympanic BP: (!) 145/84 Pulse: 82   Resp: 18 SpO2: 98 % O2 Device: None (Room air)    Weight: 190 lbs 4.11 oz  Constitutional: Sleeping upon entry but awakens easily and answers questions  Respiratory: Clear to auscultation bilaterally  Cardiovascular: Regular rate and rhythm  GI: Abdomen soft, nontender  Musculoskeletal: Generalized weakness with no focal deficit  Neuropsychiatric: Alert and oriented to person and place but not time or situation.  Has poor insight and is impulsive.    Data   Recent Labs   Lab 07/02/22  2134 07/02/22  1614 07/02/22  1108 07/02/22  0736 07/02/22  0735 06/30/22  2202 06/30/22  1448 06/28/22  0704 06/28/22  0620   WBC  --   --   --  7.7  --   --  12.8*  --  9.2   HGB  --   --   --  14.0  --   --  14.6  --  13.3   MCV  --   --   --  102*  --   --  101*  --  103*   PLT  --   --   --  306  --   --  323  --  289   INR  --   --   --   --   --   --   --   --  1.13   NA  --   --   --  140  --   --  139  --  138   POTASSIUM  --   --   --  3.8 3.8  --  3.9   < > 3.4*   CHLORIDE  --   --   --  105  --   --  101  --  103   CO2  --   --   --  23  --   --  26  --  23   BUN  --   --   --  25  --   --  21  --  18   CR  --   --   --  1.02  --   --  1.07  --  0.82   ANIONGAP  --   --   --  12  --   --  12  --  12   PAWAN  --   --   --  8.7  --   --  9.1  --  8.7   GLC 85 85 131* 82  --    < > 90   < > 84   ALBUMIN  --   --   --  3.2*  --   --  3.7  --   --    PROTTOTAL  --   --   --  5.9*  --   --  6.6  --   --    BILITOTAL  --   --   --  0.6  --   --  0.9  --   --    ALKPHOS  --   --   --  40  --   --  47  --   --    ALT  --   --   --  51  --   --  73*  --   --    AST  --   --   --  41*  --   --  55*  --   --     < > = values in this interval not displayed.     No results found for this or any previous visit (from  the past 24 hour(s)).

## 2022-07-03 NOTE — PROGRESS NOTES
07/03/22 1200   Signing Clinician's Name / Credentials   Signing clinician's name / credentials Ginger Hdz, PT   Quick Adds   Rehab Discipline PT   Functional Transfer Training   Symptoms Noted During/After Treatment fatigue;increased pain   Treatment Detail rolling side to side with mod A of 2. scooting up in bed with max a of 2   PT Discharge Planning   PT Discharge Recommendation (DC Rec) Transitional Care Facility   PT Rationale for DC Rec Pt requires A of 2 for bed mobility today, refused getting out of bed   PT Brief overview of current status mod to max A of 2 for bed mobility. decreased motiovation, increased pain everywhere   Additional Documentation   PT Plan Continue PT   Total Session Time   Total Session Time (minutes) 20 minutes

## 2022-07-03 NOTE — PLAN OF CARE
Patient admitted for alcohol withdrawal, which has now resolved. He remains hospitalized until he is medically stable and can return to custodial or possibly STR. VSS. Alert to self. Does not know time or situation. Occasionally states he knows that he is at the hospital when reorienting. He does not have a concept of time, minutes seem like hours to him.  He continues to have behaviors of attempting to self transfer out of bed repeatedly with statements that he needs to go for a walk, needs to get into the bathroom, needs to leave. He askes about his wife, kids and friends and to speak with them on the phone. He spoke with his wife multiple times throughout the shift. At time he can be be physically and verbally aggressive when he does not get his way. He will yell, swear at, kick and hit staff.  Other times he can be cooperative with occasional sexually inappropriate comments and attempts to touch staff inappropriately while repositioning. He has had incontient loose stools with multiple dressing changes to his wounds to his buttocks. PRN imodium given. Up to bedside commode with assistance of 2 staff as stand/pivot to promote fecal incontinence. Refused the use of gait belt and walker. He is still weak, unsteady and had slow movements. MASD to groin and perineum remains with nystatin powder applied. Fluid filled intact blister to right heel with foam dressing in place. Multiple attempts to keep heels floated but he keeps moving around in bed that he does not keep them floated. He has not had much intake of food or fluids, even with encouragement and multiple choices given. He did not eat dinner. PRN APAP given x 2 for generalized discomfort. PRN Seroquel 25 mg and additional one time dose of 25 given at beginning of shift; mid afternoon and then scheduled HS Seroquel given. Patient sleep for about 3 hours from [id number] and then again from [id number] then intermittently throughout the rest of the night. Staff continue  "to reorient to environment, offer choices and anticipated needs of patient.     Problem: Plan of Care - These are the overarching goals to be used throughout the patient stay.    Goal: Plan of Care Review/Shift Note  Description: The Plan of Care Review/Shift note should be completed every shift.  The Outcome Evaluation is a brief statement about your assessment that the patient is improving, declining, or no change.  This information will be displayed automatically on your shift note.  Outcome: Ongoing, Progressing  Goal: Patient-Specific Goal (Individualized)  Description: You can add care plan individualizations to a care plan. Examples of Individualization might be:  \"Parent requests to be called daily at 9am for status\", \"I have a hard time hearing out of my right ear\", or \"Do not touch me to wake me up as it startles me\".  Outcome: Ongoing, Progressing  Goal: Absence of Hospital-Acquired Illness or Injury  Outcome: Ongoing, Progressing  Intervention: Identify and Manage Fall Risk  Recent Flowsheet Documentation  Taken 7/3/2022 0320 by Irene Flowers RN  Safety Promotion/Fall Prevention:    activity supervised    assistive device/personal items within reach    bed alarm on    clutter free environment maintained    fall prevention program maintained    increased rounding and observation    increase visualization of patient    nonskid shoes/slippers when out of bed    patient and family education    room door open    room near nurse's station    treat reversible contributory factors    treat underlying cause  Taken 7/2/2022 2124 by Irene Flowers RN  Safety Promotion/Fall Prevention:    activity supervised    assistive device/personal items within reach    bed alarm on    clutter free environment maintained    fall prevention program maintained    increased rounding and observation    increase visualization of patient    nonskid shoes/slippers when out of bed    patient and family education    room " door open    room near nurse's station    treat reversible contributory factors    treat underlying cause  Taken 7/2/2022 1620 by Irene Flowers RN  Safety Promotion/Fall Prevention:    activity supervised    assistive device/personal items within reach    bed alarm on    clutter free environment maintained    fall prevention program maintained    increased rounding and observation    increase visualization of patient    nonskid shoes/slippers when out of bed    patient and family education    room door open    room near nurse's station    treat reversible contributory factors    treat underlying cause  Intervention: Prevent Skin Injury  Recent Flowsheet Documentation  Taken 7/3/2022 0320 by Irene Flowers RN  Body Position: position changed independently  Taken 7/2/2022 2124 by Irene Flowers RN  Body Position: position changed independently  Intervention: Prevent and Manage VTE (Venous Thromboembolism) Risk  Recent Flowsheet Documentation  Taken 7/3/2022 0320 by Irene Flowers RN  VTE Prevention/Management: (xarelto)    SCDs (sequential compression devices) off    patient refused intervention    other (see comments)  Activity Management:    activity adjusted per tolerance    up to bedside commode  Taken 7/3/2022 0009 by Irene Flowers RN  Activity Management:    activity adjusted per tolerance    up to bedside commode  Taken 7/2/2022 2124 by Irene Flowers RN  VTE Prevention/Management: (xarelto)    SCDs (sequential compression devices) off    patient refused intervention    other (see comments)  Activity Management:    activity adjusted per tolerance    up to bedside commode  Taken 7/2/2022 1620 by Irene Flowers RN  VTE Prevention/Management: (xarelto)    SCDs (sequential compression devices) off    patient refused intervention    other (see comments)  Intervention: Prevent Infection  Recent Flowsheet Documentation  Taken 7/3/2022 0320 by Irene Flowers  RN  Infection Prevention:    hand hygiene promoted    rest/sleep promoted    single patient room provided  Taken 7/2/2022 2124 by Irene Flowers RN  Infection Prevention:    hand hygiene promoted    rest/sleep promoted    single patient room provided  Taken 7/2/2022 1620 by Irene Flowers RN  Infection Prevention:    hand hygiene promoted    rest/sleep promoted    single patient room provided  Goal: Optimal Comfort and Wellbeing  Outcome: Ongoing, Progressing  Intervention: Monitor Pain and Promote Comfort  Recent Flowsheet Documentation  Taken 7/3/2022 0320 by Irene Flowers RN  Pain Management Interventions: medication (see MAR)  Taken 7/2/2022 2124 by Irene Flowers RN  Pain Management Interventions: medication (see MAR)  Intervention: Provide Person-Centered Care  Recent Flowsheet Documentation  Taken 7/3/2022 0320 by Irene Flowers RN  Trust Relationship/Rapport:    care explained    choices provided    questions answered    empathic listening provided    emotional support provided    reassurance provided    thoughts/feelings acknowledged  Taken 7/2/2022 2124 by Irene Flowers RN  Trust Relationship/Rapport:    care explained    choices provided    questions answered    empathic listening provided    emotional support provided    reassurance provided    thoughts/feelings acknowledged  Taken 7/2/2022 1620 by Irene Flowers RN  Trust Relationship/Rapport:    care explained    choices provided    questions answered    empathic listening provided    emotional support provided    reassurance provided    thoughts/feelings acknowledged  Goal: Readiness for Transition of Care  Outcome: Ongoing, Progressing     Problem: Fall Injury Risk  Goal: Absence of Fall and Fall-Related Injury  Outcome: Ongoing, Progressing  Intervention: Identify and Manage Contributors  Recent Flowsheet Documentation  Taken 7/3/2022 0320 by Irene Flowers RN  Medication Review/Management:  medications reviewed  Taken 7/2/2022 2124 by Irene Flowers RN  Medication Review/Management: medications reviewed  Taken 7/2/2022 1620 by Irene Flowers RN  Medication Review/Management: medications reviewed  Intervention: Promote Injury-Free Environment  Recent Flowsheet Documentation  Taken 7/3/2022 0320 by Irene Flowers RN  Safety Promotion/Fall Prevention:    activity supervised    assistive device/personal items within reach    bed alarm on    clutter free environment maintained    fall prevention program maintained    increased rounding and observation    increase visualization of patient    nonskid shoes/slippers when out of bed    patient and family education    room door open    room near nurse's station    treat reversible contributory factors    treat underlying cause  Taken 7/2/2022 2124 by Irene Flowers RN  Safety Promotion/Fall Prevention:    activity supervised    assistive device/personal items within reach    bed alarm on    clutter free environment maintained    fall prevention program maintained    increased rounding and observation    increase visualization of patient    nonskid shoes/slippers when out of bed    patient and family education    room door open    room near nurse's station    treat reversible contributory factors    treat underlying cause  Taken 7/2/2022 1620 by Irene Flowers RN  Safety Promotion/Fall Prevention:    activity supervised    assistive device/personal items within reach    bed alarm on    clutter free environment maintained    fall prevention program maintained    increased rounding and observation    increase visualization of patient    nonskid shoes/slippers when out of bed    patient and family education    room door open    room near nurse's station    treat reversible contributory factors    treat underlying cause     Problem: Behavioral Health Comorbidity  Goal: Maintenance of Behavioral Health Symptom Control  Outcome: Ongoing,  Progressing  Intervention: Maintain Behavioral Health Symptom Control  Recent Flowsheet Documentation  Taken 7/3/2022 0320 by Irene Flowers, RN  Medication Review/Management: medications reviewed  Taken 7/2/2022 2124 by Irene Flowers, RN  Medication Review/Management: medications reviewed  Taken 7/2/2022 1620 by Irene Flowers, RN  Medication Review/Management: medications reviewed     Problem: Oral Intake Inadequate  Goal: Optimal Oral Intake  Outcome: Ongoing, Progressing   Goal Outcome Evaluation:

## 2022-07-04 LAB
ALBUMIN UR-MCNC: 70 MG/DL
APPEARANCE UR: ABNORMAL
BACTERIA #/AREA URNS HPF: ABNORMAL /HPF
BASOPHILS # BLD AUTO: 0 10E3/UL (ref 0–0.2)
BASOPHILS NFR BLD AUTO: 1 %
BILIRUB UR QL STRIP: NEGATIVE
COLOR UR AUTO: YELLOW
EOSINOPHIL # BLD AUTO: 0.1 10E3/UL (ref 0–0.7)
EOSINOPHIL NFR BLD AUTO: 1 %
ERYTHROCYTE [DISTWIDTH] IN BLOOD BY AUTOMATED COUNT: 13.2 % (ref 10–15)
GLUCOSE BLDC GLUCOMTR-MCNC: 166 MG/DL (ref 70–99)
GLUCOSE BLDC GLUCOMTR-MCNC: 78 MG/DL (ref 70–99)
GLUCOSE BLDC GLUCOMTR-MCNC: 87 MG/DL (ref 70–99)
GLUCOSE BLDC GLUCOMTR-MCNC: 90 MG/DL (ref 70–99)
GLUCOSE UR STRIP-MCNC: NEGATIVE MG/DL
HCT VFR BLD AUTO: 40.3 % (ref 40–53)
HGB BLD-MCNC: 13.8 G/DL (ref 13.3–17.7)
HGB UR QL STRIP: ABNORMAL
HYALINE CASTS: 6 /LPF
IMM GRANULOCYTES # BLD: 0 10E3/UL
IMM GRANULOCYTES NFR BLD: 1 %
KETONES UR STRIP-MCNC: 10 MG/DL
LACTATE SERPL-SCNC: 0.8 MMOL/L (ref 0.7–2)
LACTATE SERPL-SCNC: 2.1 MMOL/L (ref 0.7–2)
LEUKOCYTE ESTERASE UR QL STRIP: ABNORMAL
LYMPHOCYTES # BLD AUTO: 1.5 10E3/UL (ref 0.8–5.3)
LYMPHOCYTES NFR BLD AUTO: 19 %
MAGNESIUM SERPL-MCNC: 2.2 MG/DL (ref 1.9–2.7)
MCH RBC QN AUTO: 34.6 PG (ref 26.5–33)
MCHC RBC AUTO-ENTMCNC: 34.2 G/DL (ref 31.5–36.5)
MCV RBC AUTO: 101 FL (ref 78–100)
MONOCYTES # BLD AUTO: 0.7 10E3/UL (ref 0–1.3)
MONOCYTES NFR BLD AUTO: 9 %
MUCOUS THREADS #/AREA URNS LPF: PRESENT /LPF
NEUTROPHILS # BLD AUTO: 5.8 10E3/UL (ref 1.6–8.3)
NEUTROPHILS NFR BLD AUTO: 69 %
NITRATE UR QL: NEGATIVE
NRBC # BLD AUTO: 0 10E3/UL
NRBC BLD AUTO-RTO: 0 /100
PH UR STRIP: 5.5 [PH] (ref 5–9)
PHOSPHATE SERPL-MCNC: 3.7 MG/DL (ref 2.5–5)
PLATELET # BLD AUTO: 230 10E3/UL (ref 150–450)
POTASSIUM BLD-SCNC: 3.8 MMOL/L (ref 3.5–5.1)
RBC # BLD AUTO: 3.99 10E6/UL (ref 4.4–5.9)
RBC URINE: >182 /HPF
SP GR UR STRIP: 1.02 (ref 1–1.03)
UROBILINOGEN UR STRIP-MCNC: NORMAL MG/DL
WBC # BLD AUTO: 8.1 10E3/UL (ref 4–11)
WBC CLUMPS #/AREA URNS HPF: PRESENT /HPF
WBC URINE: 52 /HPF

## 2022-07-04 PROCEDURE — 250N000011 HC RX IP 250 OP 636: Performed by: FAMILY MEDICINE

## 2022-07-04 PROCEDURE — 83735 ASSAY OF MAGNESIUM: CPT | Performed by: FAMILY MEDICINE

## 2022-07-04 PROCEDURE — 250N000013 HC RX MED GY IP 250 OP 250 PS 637: Performed by: FAMILY MEDICINE

## 2022-07-04 PROCEDURE — 84132 ASSAY OF SERUM POTASSIUM: CPT | Performed by: FAMILY MEDICINE

## 2022-07-04 PROCEDURE — 83605 ASSAY OF LACTIC ACID: CPT | Performed by: FAMILY MEDICINE

## 2022-07-04 PROCEDURE — 258N000003 HC RX IP 258 OP 636: Performed by: FAMILY MEDICINE

## 2022-07-04 PROCEDURE — 120N000001 HC R&B MED SURG/OB

## 2022-07-04 PROCEDURE — 85025 COMPLETE CBC W/AUTO DIFF WBC: CPT | Performed by: FAMILY MEDICINE

## 2022-07-04 PROCEDURE — 87086 URINE CULTURE/COLONY COUNT: CPT | Performed by: FAMILY MEDICINE

## 2022-07-04 PROCEDURE — 81001 URINALYSIS AUTO W/SCOPE: CPT | Performed by: FAMILY MEDICINE

## 2022-07-04 PROCEDURE — 84100 ASSAY OF PHOSPHORUS: CPT | Performed by: FAMILY MEDICINE

## 2022-07-04 PROCEDURE — 250N000013 HC RX MED GY IP 250 OP 250 PS 637: Performed by: INTERNAL MEDICINE

## 2022-07-04 PROCEDURE — 250N000013 HC RX MED GY IP 250 OP 250 PS 637: Performed by: STUDENT IN AN ORGANIZED HEALTH CARE EDUCATION/TRAINING PROGRAM

## 2022-07-04 PROCEDURE — 36415 COLL VENOUS BLD VENIPUNCTURE: CPT | Performed by: FAMILY MEDICINE

## 2022-07-04 PROCEDURE — 99232 SBSQ HOSP IP/OBS MODERATE 35: CPT | Performed by: FAMILY MEDICINE

## 2022-07-04 PROCEDURE — 36600 WITHDRAWAL OF ARTERIAL BLOOD: CPT | Performed by: FAMILY MEDICINE

## 2022-07-04 RX ORDER — CEFTRIAXONE SODIUM 1 G/50ML
1 INJECTION, SOLUTION INTRAVENOUS EVERY 24 HOURS
Status: DISCONTINUED | OUTPATIENT
Start: 2022-07-04 | End: 2022-07-06

## 2022-07-04 RX ORDER — QUETIAPINE FUMARATE 25 MG/1
25 TABLET, FILM COATED ORAL ONCE
Status: COMPLETED | OUTPATIENT
Start: 2022-07-04 | End: 2022-07-04

## 2022-07-04 RX ADMIN — SODIUM CHLORIDE: 900 INJECTION, SOLUTION INTRAVENOUS at 21:31

## 2022-07-04 RX ADMIN — FAMOTIDINE 20 MG: 20 TABLET ORAL at 21:09

## 2022-07-04 RX ADMIN — LISINOPRIL 20 MG: 20 TABLET ORAL at 09:48

## 2022-07-04 RX ADMIN — THIAMINE HCL TAB 100 MG 300 MG: 100 TAB at 09:48

## 2022-07-04 RX ADMIN — SODIUM CHLORIDE 500 ML: 9 INJECTION, SOLUTION INTRAVENOUS at 19:58

## 2022-07-04 RX ADMIN — MELATONIN TAB 3 MG 3 MG: 3 TAB at 21:09

## 2022-07-04 RX ADMIN — QUETIAPINE FUMARATE 25 MG: 25 TABLET ORAL at 15:01

## 2022-07-04 RX ADMIN — METOPROLOL TARTRATE 25 MG: 25 TABLET, FILM COATED ORAL at 09:48

## 2022-07-04 RX ADMIN — FAMOTIDINE 20 MG: 20 TABLET ORAL at 09:48

## 2022-07-04 RX ADMIN — RIVAROXABAN 15 MG: 15 TABLET, FILM COATED ORAL at 17:49

## 2022-07-04 RX ADMIN — QUETIAPINE FUMARATE 100 MG: 100 TABLET ORAL at 21:09

## 2022-07-04 RX ADMIN — SODIUM CHLORIDE 500 ML: 9 INJECTION, SOLUTION INTRAVENOUS at 16:27

## 2022-07-04 RX ADMIN — Medication 1 TABLET: at 09:48

## 2022-07-04 RX ADMIN — NYSTATIN: 100000 POWDER TOPICAL at 09:48

## 2022-07-04 RX ADMIN — METOPROLOL TARTRATE 25 MG: 25 TABLET, FILM COATED ORAL at 21:09

## 2022-07-04 RX ADMIN — FOLIC ACID 1 MG: 1 TABLET ORAL at 09:48

## 2022-07-04 RX ADMIN — CEFTRIAXONE SODIUM 1 G: 1 INJECTION, SOLUTION INTRAVENOUS at 20:43

## 2022-07-04 RX ADMIN — RIVAROXABAN 15 MG: 15 TABLET, FILM COATED ORAL at 07:31

## 2022-07-04 RX ADMIN — NICOTINE 1 PATCH: 14 PATCH, EXTENDED RELEASE TRANSDERMAL at 07:28

## 2022-07-04 RX ADMIN — NYSTATIN: 100000 POWDER TOPICAL at 21:09

## 2022-07-04 RX ADMIN — QUETIAPINE FUMARATE 25 MG: 25 TABLET ORAL at 14:17

## 2022-07-04 RX ADMIN — LOPERAMIDE HYDROCHLORIDE 2 MG: 2 CAPSULE ORAL at 14:20

## 2022-07-04 ASSESSMENT — ACTIVITIES OF DAILY LIVING (ADL)
ADLS_ACUITY_SCORE: 48
ADLS_ACUITY_SCORE: 52
ADLS_ACUITY_SCORE: 48
ADLS_ACUITY_SCORE: 48
ADLS_ACUITY_SCORE: 52
ADLS_ACUITY_SCORE: 48
ADLS_ACUITY_SCORE: 52
ADLS_ACUITY_SCORE: 48
ADLS_ACUITY_SCORE: 52
ADLS_ACUITY_SCORE: 48

## 2022-07-04 NOTE — PROGRESS NOTES
:     Patient is making some improvement and may possibly be ready to return to [facility] tomorrow.    REBECCA Roblero on 7/4/2022 at 1:16 PM

## 2022-07-04 NOTE — PLAN OF CARE
Patient given HS medications and slept throughout most of night.  Minimal attempts to get out of bed.   Dressing changed at start of shift and remains CDI, no bowel movements this shift.  Has had low urine output this shift via archer.  Patient taking in minimal intake.  VSS, afebrile.     Problem: Plan of Care - These are the overarching goals to be used throughout the patient stay.    Goal: Plan of Care Review/Shift Note    Outcome: Ongoing, Progressing  Flowsheets (Taken 7/4/2022 0535)  Plan of Care Reviewed With: patient  Overall Patient Progress: improving  Goal: Absence of Hospital-Acquired Illness or Injury  Intervention: Identify and Manage Fall Risk  Recent Flowsheet Documentation  Taken 7/4/2022 0522 by Krystina Lorenzana RN  Safety Promotion/Fall Prevention:   activity supervised   assistive device/personal items within reach   bed alarm on   clutter free environment maintained   fall prevention program maintained   nonskid shoes/slippers when out of bed   patient and family education   room door open   room near nurse's station   room organization consistent   safety round/check completed   supervised activity   treat reversible contributory factors   treat underlying cause  Intervention: Prevent Skin Injury  Recent Flowsheet Documentation  Taken 7/4/2022 0522 by Krystina Lorenzana RN  Body Position:   weight shifting   log-rolled   side-lying   turned   right  Intervention: Prevent and Manage VTE (Venous Thromboembolism) Risk  Recent Flowsheet Documentation  Taken 7/4/2022 0522 by Krystina Lorenzana RN  VTE Prevention/Management: SCDs (sequential compression devices) off  Activity Management: activity adjusted per tolerance  Taken 7/3/2022 1924 by Krystina Lorenzana RN  VTE Prevention/Management: SCDs (sequential compression devices) off  Intervention: Prevent Infection  Recent Flowsheet Documentation  Taken 7/4/2022 0522 by Krystina Lorenzana RN  Infection Prevention:   hand hygiene promoted    personal protective equipment utilized  Taken 7/3/2022 1924 by Krystina Lorenzana RN  Infection Prevention:   hand hygiene promoted   personal protective equipment utilized  Goal: Optimal Comfort and Wellbeing  Intervention: Provide Person-Centered Care  Recent Flowsheet Documentation  Taken 7/4/2022 0522 by Krystina Lorenzana RN  Trust Relationship/Rapport:   care explained   choices provided   emotional support provided   empathic listening provided   questions encouraged   questions answered   reassurance provided   thoughts/feelings acknowledged  Taken 7/3/2022 1924 by Krystina Lorenzana RN  Trust Relationship/Rapport:   care explained   choices provided   emotional support provided   empathic listening provided   questions encouraged   questions answered   reassurance provided   thoughts/feelings acknowledged     Problem: Fall Injury Risk  Goal: Absence of Fall and Fall-Related Injury  Intervention: Identify and Manage Contributors  Recent Flowsheet Documentation  Taken 7/4/2022 0522 by Krystina Lorenzana RN  Medication Review/Management:   medications reviewed   high-risk medications identified  Intervention: Promote Injury-Free Environment  Recent Flowsheet Documentation  Taken 7/4/2022 0522 by Krystina Lorenzana RN  Safety Promotion/Fall Prevention:   activity supervised   assistive device/personal items within reach   bed alarm on   clutter free environment maintained   fall prevention program maintained   nonskid shoes/slippers when out of bed   patient and family education   room door open   room near nurse's station   room organization consistent   safety round/check completed   supervised activity   treat reversible contributory factors   treat underlying cause     Problem: Behavioral Health Comorbidity  Goal: Maintenance of Behavioral Health Symptom Control  Intervention: Maintain Behavioral Health Symptom Control  Recent Flowsheet Documentation  Taken 7/4/2022 0522 by Krystina Lorenzana,  RN  Medication Review/Management:   medications reviewed   high-risk medications identified     Problem: Restraint, Nonbehavioral (Nonviolent)  Goal: Absence of Harm or Injury  Intervention: Protect Dignity, Rights, and Personal Wellbeing  Recent Flowsheet Documentation  Taken 7/4/2022 0522 by Krystina Lorenzana RN  Trust Relationship/Rapport:   care explained   choices provided   emotional support provided   empathic listening provided   questions encouraged   questions answered   reassurance provided   thoughts/feelings acknowledged  Taken 7/3/2022 1924 by Krystina Lorenzana RN  Trust Relationship/Rapport:   care explained   choices provided   emotional support provided   empathic listening provided   questions encouraged   questions answered   reassurance provided   thoughts/feelings acknowledged  Intervention: Protect Skin and Joint Integrity  Recent Flowsheet Documentation  Taken 7/4/2022 0522 by Krystina Lorenzana RN  Body Position:   weight shifting   log-rolled   side-lying   turned   right   Goal Outcome Evaluation:    Plan of Care Reviewed With: patient     Overall Patient Progress: improving

## 2022-07-04 NOTE — PROGRESS NOTES
[facility]    Medicine Progress Note - Hospitalist Service    Date of Admission:  6/17/2022    Assessment & Plan            Antonio Rutherford is a 72 year old male admitted on 6/17/2022. He presented from the snf for concerns of possible kidney stone versus sepsis.     Patient was admitted to the snf 7 days PTA.  He is a daily drinker of whiskey and was treated for alcohol withdrawal at the snf and then started on Bactrim for possible UTI 4 days PTA.  He had extremely poor oral intake PTA (20 oz in 7d per snf nurse report).     Patient was admitted treated with thiamine, folate and IV fluids, strict I's and O's and close monitoring.  Developed worsening confusion and somnolence and concern to protect his airway.  Got intubated on 6/21/2022, extubated 6/24/2022.    For the past few days has had slow daily improvement in his mental status and functionality.    Patient did much better over the past 24 hours, nurses estimate 5 attempts to get out of bed and this morning he is actually more mentally competent than I have seen him since admission but still only oriented to person and place and not situation.       Alcohol dependence with withdrawal delirium (H):   Assessment: Resolved.  Sedated and intubated in the ICU on 6/21/22 for somnolence, extubated on 6/24/2022. Ammonia level was normal, ABG did not show significant CO2 retention, head CT and brain MRI neg for acute findings. Repeat CT of the head negative.    Melatonin added to Seroquel last night.  Seem to work well.  -Discontinued CIWA on 6/27  -Minimize sedating medication  -In discussion with social work patient does need to return to snf on discharge.  There is court proceedings determining if he would be allowed to go to skilled nursing facility next week.     Encephalopathy, metabolic.  CT and MRI head/brain negative for acute findings. Likely due to ICU acquired delirium at this time.  Cannot exclude encephalopathy , he should be  out of the timeframe for withdrawal.    Today has had significant improvement in mental status.  Hopefully that continues.  Plan:   -Delirium order set  -Appreciate PT/OT input for dispo needs     Stage 1 pressure ulcer of buttocks with area of unstageable eschar  Heel pressure ulcer stage 1. Left foot.  Present on admission  -offload   -Air mattress  -Continue Escobedo to minimize skin breakdown     Acute PE, POA: Diagnosed 6/17, likely provoked from immobility and acute illness.  -Initially started on lovenox 1.5mg/kg/d now able to take oral medication  -Continue Xarelto for PE treatment     Aortic valve echodensity: Probable chronic calcific nodule, could also represent small clot, unlikely to represent infective endocarditis process with initial negative blood cultures.  No evidence of CVA given MRI findings and at this point given how critically ill he has been will anticoagulate as above, monitor for clinical improvement and consider repeat ELLEN as an outpatient to ensure stability/resolution.  -Follow-up repeat blood cultures showed no growth     CHARLOTTE due to ATN (acute tubular necrosis) (H): Resolved, creatinine peaked at 2.7, likely from prerenal component from hypovolemia and infection and possible ATN from Bactrim/ibuprofen prior to admission.    Plan:  -CMP unremarkable yesterday with resolution of CHARLOTTE.  -avoid NSAIDs and nephrotoxins     Community-acquired versus aspiration pneumonia: Possibly contributing to need for intubation with possible component of aspiration given alcohol abuse  -Completed 7-day course of Zosyn     UTI. POA: Resolved.  Empirically treated with Bactrim, Urine culture negative.  No growth in urine culture. Blood cultures negative.      Essential hypertension: Stable.  -Continue as needed hydralazine and-labetalol  -Continue home ACE  -Continue metoprolol 25 mg twice daily     Severe protein calorie malnutrition: Generally poor oral intake.    -Inpatient dietary eval      Sinus  tachycardia: Resolved, likely from withdrawal.  Got amiodarone x24 hours. Now HR regular and fairly well controlled, moderate elevation especially with agitation.   Plan:   -Discontinue telemetry      diarrhea: Resolved  -C. difficile and stool culture negative.    -Imodium as needed     Smoker.  No wheezing or contribution of COPD exacerbation during his stay      Hypokalemia  Likely from poor oral intake.  Improved.  Replace per protocol-    Malnutrition  Prealbumin has been stable at 20.  Patient needs assistance with eating at this time.  Continue with high-calorie supplements.  Recheck prealbumin Tuesday.  We will also check B12 level given elevated MCV and folate although he has received folate throughout hospitalization is exceedingly unlikely he is folate deficient.                        Diet: Minced & Moist Diet (level 5) Thin Liquids (level 0)  Snacks/Supplements Adult: Ensure Enlive; With Meals    DVT Prophylaxis: DOAC  Escobedo Catheter: PRESENT, indication: Wound Healing, Strict 1-2 Hour I&O  Central Lines: None  Cardiac Monitoring: None  Code Status: Full Code      Disposition Plan    if patient continues to improve he can likely be discharged to care home tomorrow.     The patient's care was discussed with the Patient and Patient's Family.    Karl Menjivar MD  Hospitalist Service  [facility]  Securely message with the Vocera Web Console (learn more here)  Text page via [employer] Paging/Directory         Clinically Significant Risk Factors Present on Admission                      ______________________________________________________________________    Interval History   Patient is doing much better this morning.  He is able to answer questions.  Denies any pain.  He knows he is in the hospital but he does not know why.  He tells me that he does remember me which is the first time this has occurred.  He is trying to feed himself and is getting approximately 50% of the food in his mouth  and 50% on his beard.  Discussed condition with nurses and they have noted significant improvement over the past 24 hours and his impulsivity and agitation.  Behaviors have been much more appropriate.    Data reviewed today: I reviewed all medications, new labs and imaging results over the last 24 hours. I personally reviewed no images or EKG's today.    Physical Exam   Vital Signs: Temp: 98.2  F (36.8  C) Temp src: Tympanic BP: (!) 146/75 Pulse: 78   Resp: 16 SpO2: 95 % O2 Device: None (Room air)    Weight: 188 lbs .84 oz  Constitutional: awake, alert, cooperative, no apparent distress, and appears stated age  Respiratory: No increased work of breathing, good air exchange, clear to auscultation bilaterally, no crackles or wheezing  Cardiovascular: Regularrate and rhythm.  No murmurs rubs or gallops heard.   GI: Abdomen Soft, non-tender.  No masses, rebound or guarding.   Neuropsychiatric: General: normal, calm and normal eye contact  Orientation: oriented to self, place, not time or situation  Memory and insight: memory for past and recent events absent but thought process normal    Data   Recent Labs   Lab 07/04/22  1107 07/04/22  0724 07/04/22  0541 07/03/22  2125 07/03/22  1114 07/03/22  0921 07/02/22  1108 07/02/22  0736 06/30/22  2202 06/30/22  1448 06/28/22  0704 06/28/22  0620   WBC  --   --   --   --   --  7.1  --  7.7  --  12.8*  --  9.2   HGB  --   --   --   --   --  13.7  --  14.0  --  14.6  --  13.3   MCV  --   --   --   --   --  110*  --  102*  --  101*  --  103*   PLT  --   --   --   --   --  225  --  306  --  323  --  289   INR  --   --   --   --   --   --   --   --   --   --   --  1.13   NA  --   --   --   --   --  135  --  140  --  139  --  138   POTASSIUM  --   --  3.8  --   --  3.5  --  3.8   < > 3.9   < > 3.4*   CHLORIDE  --   --   --   --   --  103  --  105  --  101  --  103   CO2  --   --   --   --   --  21  --  23  --  26  --  23   BUN  --   --   --   --   --  27*  --  25  --  21  --  18   CR   --   --   --   --   --  1.16  --  1.02  --  1.07  --  0.82   ANIONGAP  --   --   --   --   --  11  --  12  --  12  --  12   PAWAN  --   --   --   --   --  8.5*  --  8.7  --  9.1  --  8.7   * 78  --  81   < > 85   < > 82   < > 90   < > 84   ALBUMIN  --   --   --   --   --  3.2*  --  3.2*  --  3.7   < >  --    PROTTOTAL  --   --   --   --   --  6.1*  --  5.9*  --  6.6   < >  --    BILITOTAL  --   --   --   --   --  0.6  --  0.6  --  0.9   < >  --    ALKPHOS  --   --   --   --   --  40  --  40  --  47   < >  --    ALT  --   --   --   --   --  48  --  51  --  73*   < >  --    AST  --   --   --   --   --  30  --  41*  --  55*   < >  --     < > = values in this interval not displayed.     No results found for this or any previous visit (from the past 24 hour(s)).

## 2022-07-04 NOTE — PROGRESS NOTES
Verbal orders given from DR. Menjivar for Rocephin 1 Gm Q24 hours, lactic and CBC to be placed. Orders placed. Ynes Ochoa RN 7/4/2022 6:39 PM

## 2022-07-04 NOTE — PLAN OF CARE
"BP (!) 162/78 (BP Location: Right arm, Patient Position: Supine, Cuff Size: Adult Regular)   Pulse 95   Temp 98.9  F (37.2  C)   Resp 16   Ht 1.778 m (5' 10\")   Wt 85.3 kg (188 lb 0.8 oz)   SpO2 95%   BMI 26.98 kg/m       Patient restless needing multiple redirection from staff throughout the day, multiple attempts to crawl out of bed-agitated, yelling at staff. Confused. Pt ambulated to bathroom x3, up in chair for approx an hour at lunch-all interventions not effective to redirect behaviors/restlessness. PRN Seroquel admin without good effect-extra dose administered per MD Menjivar without good effect. Staff continue to respond to bed alarm and redirect patient for safety.   Temp elevated to 100.6, urine output 350- dark boubacar in color with sediment, MD Menjivar updated-UA obtained and gave 500 ml NS bolus. Temp improved to 97.9  Problem: Plan of Care - These are the overarching goals to be used throughout the patient stay.    Goal: Patient-Specific Goal (Individualized)  Ambulate as tolerated  Outcome: Ongoing, Progressing   Goal Outcome Evaluation:    Plan of Care Reviewed With: patient     Overall Patient Progress: improving           "

## 2022-07-05 ENCOUNTER — APPOINTMENT (OUTPATIENT)
Dept: GENERAL RADIOLOGY | Facility: OTHER | Age: 72
DRG: 673 | End: 2022-07-05
Attending: FAMILY MEDICINE
Payer: MEDICARE

## 2022-07-05 ENCOUNTER — ANESTHESIA (OUTPATIENT)
Dept: INTENSIVE CARE | Facility: OTHER | Age: 72
DRG: 673 | End: 2022-07-05
Payer: MEDICARE

## 2022-07-05 ENCOUNTER — ANESTHESIA EVENT (OUTPATIENT)
Dept: INTENSIVE CARE | Facility: OTHER | Age: 72
DRG: 673 | End: 2022-07-05
Payer: MEDICARE

## 2022-07-05 LAB
ALBUMIN SERPL-MCNC: 3.3 G/DL (ref 3.5–5.7)
ALP SERPL-CCNC: 61 U/L (ref 34–104)
ALT SERPL W P-5'-P-CCNC: 115 U/L (ref 7–52)
ANION GAP SERPL CALCULATED.3IONS-SCNC: 12 MMOL/L (ref 3–14)
ANION GAP SERPL CALCULATED.3IONS-SCNC: 9 MMOL/L (ref 3–14)
AST SERPL W P-5'-P-CCNC: 155 U/L (ref 13–39)
ATRIAL RATE - MUSE: 93 BPM
BASE EXCESS BLDA CALC-SCNC: -2.3 MMOL/L (ref -9–1.8)
BILIRUB SERPL-MCNC: 0.7 MG/DL (ref 0.3–1)
BUN SERPL-MCNC: 26 MG/DL (ref 7–25)
BUN SERPL-MCNC: 26 MG/DL (ref 7–25)
CALCIUM SERPL-MCNC: 8.7 MG/DL (ref 8.6–10.3)
CALCIUM SERPL-MCNC: 8.7 MG/DL (ref 8.6–10.3)
CHLORIDE BLD-SCNC: 101 MMOL/L (ref 98–107)
CHLORIDE BLD-SCNC: 105 MMOL/L (ref 98–107)
CO2 SERPL-SCNC: 26 MMOL/L (ref 21–31)
CO2 SERPL-SCNC: 28 MMOL/L (ref 21–31)
CREAT SERPL-MCNC: 1.15 MG/DL (ref 0.7–1.3)
CREAT SERPL-MCNC: 1.3 MG/DL (ref 0.7–1.3)
DIASTOLIC BLOOD PRESSURE - MUSE: NORMAL MMHG
ERYTHROCYTE [DISTWIDTH] IN BLOOD BY AUTOMATED COUNT: 13.3 % (ref 10–15)
ERYTHROCYTE [DISTWIDTH] IN BLOOD BY AUTOMATED COUNT: 13.4 % (ref 10–15)
FOLATE SERPL-MCNC: 20.2 NG/ML
GFR SERPL CREATININE-BSD FRML MDRD: 58 ML/MIN/1.73M2
GFR SERPL CREATININE-BSD FRML MDRD: 67 ML/MIN/1.73M2
GLUCOSE BLD-MCNC: 164 MG/DL (ref 70–105)
GLUCOSE BLD-MCNC: 83 MG/DL (ref 70–105)
GLUCOSE BLDC GLUCOMTR-MCNC: 105 MG/DL (ref 70–99)
GLUCOSE BLDC GLUCOMTR-MCNC: 84 MG/DL (ref 70–99)
HCO3 BLD-SCNC: 25 MMOL/L (ref 21–28)
HCT VFR BLD AUTO: 38.6 % (ref 40–53)
HCT VFR BLD AUTO: 39 % (ref 40–53)
HGB BLD-MCNC: 12.9 G/DL (ref 13.3–17.7)
HGB BLD-MCNC: 13 G/DL (ref 13.3–17.7)
INTERPRETATION ECG - MUSE: NORMAL
LACTATE SERPL-SCNC: 1.4 MMOL/L (ref 0.7–2)
LACTATE SERPL-SCNC: 2.6 MMOL/L (ref 0.7–2)
MAGNESIUM SERPL-MCNC: 2.1 MG/DL (ref 1.9–2.7)
MCH RBC QN AUTO: 34.4 PG (ref 26.5–33)
MCH RBC QN AUTO: 34.5 PG (ref 26.5–33)
MCHC RBC AUTO-ENTMCNC: 33.1 G/DL (ref 31.5–36.5)
MCHC RBC AUTO-ENTMCNC: 33.7 G/DL (ref 31.5–36.5)
MCV RBC AUTO: 102 FL (ref 78–100)
MCV RBC AUTO: 104 FL (ref 78–100)
O2/TOTAL GAS SETTING VFR VENT: 100 %
P AXIS - MUSE: 66 DEGREES
PCO2 BLD: 50 MM HG (ref 35–45)
PH BLD: 7.3 [PH] (ref 7.35–7.45)
PHOSPHATE SERPL-MCNC: 3.1 MG/DL (ref 2.5–5)
PLATELET # BLD AUTO: 194 10E3/UL (ref 150–450)
PLATELET # BLD AUTO: 264 10E3/UL (ref 150–450)
PO2 BLD: 414 MM HG (ref 80–105)
POTASSIUM BLD-SCNC: 3.7 MMOL/L (ref 3.5–5.1)
POTASSIUM BLD-SCNC: 3.8 MMOL/L (ref 3.5–5.1)
PR INTERVAL - MUSE: 152 MS
PREALB SERPL IA-MCNC: 12 MG/DL (ref 15–45)
PROT SERPL-MCNC: 6.5 G/DL (ref 6.4–8.9)
QRS DURATION - MUSE: 88 MS
QT - MUSE: 390 MS
QTC - MUSE: 484 MS
R AXIS - MUSE: 90 DEGREES
RBC # BLD AUTO: 3.74 10E6/UL (ref 4.4–5.9)
RBC # BLD AUTO: 3.78 10E6/UL (ref 4.4–5.9)
SODIUM SERPL-SCNC: 139 MMOL/L (ref 134–144)
SODIUM SERPL-SCNC: 142 MMOL/L (ref 134–144)
SYSTOLIC BLOOD PRESSURE - MUSE: NORMAL MMHG
T AXIS - MUSE: 64 DEGREES
TROPONIN I SERPL-MCNC: 14 PG/ML (ref 0–34)
VENTRICULAR RATE- MUSE: 93 BPM
VIT B12 SERPL-MCNC: 359 PG/ML (ref 180–914)
WBC # BLD AUTO: 12.7 10E3/UL (ref 4–11)
WBC # BLD AUTO: 6.6 10E3/UL (ref 4–11)

## 2022-07-05 PROCEDURE — 999N000157 HC STATISTIC RCP TIME EA 10 MIN

## 2022-07-05 PROCEDURE — 31500 INSERT EMERGENCY AIRWAY: CPT | Performed by: NURSE ANESTHETIST, CERTIFIED REGISTERED

## 2022-07-05 PROCEDURE — 250N000011 HC RX IP 250 OP 636: Performed by: FAMILY MEDICINE

## 2022-07-05 PROCEDURE — 82746 ASSAY OF FOLIC ACID SERUM: CPT | Performed by: FAMILY MEDICINE

## 2022-07-05 PROCEDURE — 85014 HEMATOCRIT: CPT | Performed by: FAMILY MEDICINE

## 2022-07-05 PROCEDURE — 5A12012 PERFORMANCE OF CARDIAC OUTPUT, SINGLE, MANUAL: ICD-10-PCS | Performed by: FAMILY MEDICINE

## 2022-07-05 PROCEDURE — 82803 BLOOD GASES ANY COMBINATION: CPT | Performed by: FAMILY MEDICINE

## 2022-07-05 PROCEDURE — 258N000003 HC RX IP 258 OP 636: Performed by: INTERNAL MEDICINE

## 2022-07-05 PROCEDURE — 0BH18EZ INSERTION OF ENDOTRACHEAL AIRWAY INTO TRACHEA, VIA NATURAL OR ARTIFICIAL OPENING ENDOSCOPIC: ICD-10-PCS | Performed by: FAMILY MEDICINE

## 2022-07-05 PROCEDURE — 83605 ASSAY OF LACTIC ACID: CPT | Performed by: FAMILY MEDICINE

## 2022-07-05 PROCEDURE — 250N000013 HC RX MED GY IP 250 OP 250 PS 637: Performed by: FAMILY MEDICINE

## 2022-07-05 PROCEDURE — 87040 BLOOD CULTURE FOR BACTERIA: CPT | Performed by: FAMILY MEDICINE

## 2022-07-05 PROCEDURE — 200N000001 HC R&B ICU

## 2022-07-05 PROCEDURE — 94002 VENT MGMT INPAT INIT DAY: CPT

## 2022-07-05 PROCEDURE — 36600 WITHDRAWAL OF ARTERIAL BLOOD: CPT | Performed by: FAMILY MEDICINE

## 2022-07-05 PROCEDURE — 99232 SBSQ HOSP IP/OBS MODERATE 35: CPT | Performed by: FAMILY MEDICINE

## 2022-07-05 PROCEDURE — 36415 COLL VENOUS BLD VENIPUNCTURE: CPT | Performed by: FAMILY MEDICINE

## 2022-07-05 PROCEDURE — 36416 COLLJ CAPILLARY BLOOD SPEC: CPT | Performed by: FAMILY MEDICINE

## 2022-07-05 PROCEDURE — 999N000065 XR CHEST PORT 1 VIEW

## 2022-07-05 PROCEDURE — 82607 VITAMIN B-12: CPT | Performed by: FAMILY MEDICINE

## 2022-07-05 PROCEDURE — 250N000011 HC RX IP 250 OP 636: Performed by: NURSE ANESTHETIST, CERTIFIED REGISTERED

## 2022-07-05 PROCEDURE — 36415 COLL VENOUS BLD VENIPUNCTURE: CPT | Performed by: INTERNAL MEDICINE

## 2022-07-05 PROCEDURE — 71045 X-RAY EXAM CHEST 1 VIEW: CPT

## 2022-07-05 PROCEDURE — 5A1955Z RESPIRATORY VENTILATION, GREATER THAN 96 CONSECUTIVE HOURS: ICD-10-PCS | Performed by: FAMILY MEDICINE

## 2022-07-05 PROCEDURE — 84134 ASSAY OF PREALBUMIN: CPT | Performed by: FAMILY MEDICINE

## 2022-07-05 PROCEDURE — 84484 ASSAY OF TROPONIN QUANT: CPT | Performed by: FAMILY MEDICINE

## 2022-07-05 PROCEDURE — 85027 COMPLETE CBC AUTOMATED: CPT | Performed by: FAMILY MEDICINE

## 2022-07-05 PROCEDURE — 250N000009 HC RX 250: Performed by: INTERNAL MEDICINE

## 2022-07-05 PROCEDURE — 99291 CRITICAL CARE FIRST HOUR: CPT | Performed by: FAMILY MEDICINE

## 2022-07-05 PROCEDURE — 250N000013 HC RX MED GY IP 250 OP 250 PS 637: Performed by: INTERNAL MEDICINE

## 2022-07-05 PROCEDURE — 87040 BLOOD CULTURE FOR BACTERIA: CPT | Performed by: INTERNAL MEDICINE

## 2022-07-05 PROCEDURE — 83735 ASSAY OF MAGNESIUM: CPT | Performed by: FAMILY MEDICINE

## 2022-07-05 PROCEDURE — 84100 ASSAY OF PHOSPHORUS: CPT | Performed by: FAMILY MEDICINE

## 2022-07-05 PROCEDURE — 80053 COMPREHEN METABOLIC PANEL: CPT | Performed by: FAMILY MEDICINE

## 2022-07-05 PROCEDURE — 258N000003 HC RX IP 258 OP 636: Performed by: FAMILY MEDICINE

## 2022-07-05 PROCEDURE — 93010 ELECTROCARDIOGRAM REPORT: CPT | Performed by: INTERNAL MEDICINE

## 2022-07-05 PROCEDURE — 250N000011 HC RX IP 250 OP 636: Performed by: INTERNAL MEDICINE

## 2022-07-05 RX ORDER — SODIUM CHLORIDE 9 MG/ML
INJECTION, SOLUTION INTRAVENOUS CONTINUOUS
Status: DISCONTINUED | OUTPATIENT
Start: 2022-07-05 | End: 2022-07-09

## 2022-07-05 RX ORDER — PROPOFOL 10 MG/ML
INJECTION, EMULSION INTRAVENOUS PRN
Status: DISCONTINUED | OUTPATIENT
Start: 2022-07-05 | End: 2022-07-05

## 2022-07-05 RX ORDER — PROPOFOL 10 MG/ML
5-75 INJECTION, EMULSION INTRAVENOUS CONTINUOUS
Status: DISCONTINUED | OUTPATIENT
Start: 2022-07-05 | End: 2022-07-12 | Stop reason: HOSPADM

## 2022-07-05 RX ORDER — FENTANYL CITRATE 50 UG/ML
100 INJECTION, SOLUTION INTRAMUSCULAR; INTRAVENOUS
Status: DISCONTINUED | OUTPATIENT
Start: 2022-07-05 | End: 2022-07-06

## 2022-07-05 RX ORDER — FAMOTIDINE 20 MG/1
20 TABLET, FILM COATED ORAL 2 TIMES DAILY
Status: DISCONTINUED | OUTPATIENT
Start: 2022-07-05 | End: 2022-07-07

## 2022-07-05 RX ORDER — ROPIVACAINE IN 0.9% SOD CHL/PF 0.1 %
.03-.125 PLASTIC BAG, INJECTION (ML) EPIDURAL CONTINUOUS
Status: DISCONTINUED | OUTPATIENT
Start: 2022-07-05 | End: 2022-07-08

## 2022-07-05 RX ADMIN — METOPROLOL TARTRATE 25 MG: 25 TABLET, FILM COATED ORAL at 10:42

## 2022-07-05 RX ADMIN — LISINOPRIL 20 MG: 20 TABLET ORAL at 10:42

## 2022-07-05 RX ADMIN — PROPOFOL 65 MCG/KG/MIN: 10 INJECTION, EMULSION INTRAVENOUS at 22:51

## 2022-07-05 RX ADMIN — MIDAZOLAM 2 MG: 1 INJECTION INTRAMUSCULAR; INTRAVENOUS at 21:48

## 2022-07-05 RX ADMIN — Medication 0.03 MCG/KG/MIN: at 23:38

## 2022-07-05 RX ADMIN — THIAMINE HCL TAB 100 MG 300 MG: 100 TAB at 10:41

## 2022-07-05 RX ADMIN — PROPOFOL 75 MCG/KG/MIN: 10 INJECTION, EMULSION INTRAVENOUS at 20:10

## 2022-07-05 RX ADMIN — Medication 1 TABLET: at 10:39

## 2022-07-05 RX ADMIN — NICOTINE 1 PATCH: 14 PATCH, EXTENDED RELEASE TRANSDERMAL at 08:43

## 2022-07-05 RX ADMIN — PROPOFOL 50 MG: 10 INJECTION, EMULSION INTRAVENOUS at 11:20

## 2022-07-05 RX ADMIN — PROPOFOL 10 MCG/KG/MIN: 10 INJECTION, EMULSION INTRAVENOUS at 11:57

## 2022-07-05 RX ADMIN — FOLIC ACID 1 MG: 1 TABLET ORAL at 10:39

## 2022-07-05 RX ADMIN — MIDAZOLAM 2 MG: 1 INJECTION INTRAMUSCULAR; INTRAVENOUS at 16:00

## 2022-07-05 RX ADMIN — CEFTRIAXONE SODIUM 1 G: 1 INJECTION, SOLUTION INTRAVENOUS at 22:45

## 2022-07-05 RX ADMIN — FAMOTIDINE 20 MG: 20 TABLET ORAL at 10:39

## 2022-07-05 RX ADMIN — SODIUM CHLORIDE 500 ML: 9 INJECTION, SOLUTION INTRAVENOUS at 21:29

## 2022-07-05 RX ADMIN — PROPOFOL 65 MCG/KG/MIN: 10 INJECTION, EMULSION INTRAVENOUS at 15:13

## 2022-07-05 RX ADMIN — PROPOFOL 75 MCG/KG/MIN: 10 INJECTION, EMULSION INTRAVENOUS at 17:44

## 2022-07-05 RX ADMIN — SODIUM CHLORIDE 500 ML: 9 INJECTION, SOLUTION INTRAVENOUS at 20:28

## 2022-07-05 RX ADMIN — FENTANYL CITRATE 100 MCG: 50 INJECTION, SOLUTION INTRAMUSCULAR; INTRAVENOUS at 20:10

## 2022-07-05 RX ADMIN — FENTANYL CITRATE 100 MCG: 50 INJECTION, SOLUTION INTRAMUSCULAR; INTRAVENOUS at 15:29

## 2022-07-05 RX ADMIN — RIVAROXABAN 15 MG: 15 TABLET, FILM COATED ORAL at 08:42

## 2022-07-05 RX ADMIN — SODIUM CHLORIDE 500 ML: 9 INJECTION, SOLUTION INTRAVENOUS at 16:46

## 2022-07-05 RX ADMIN — SODIUM CHLORIDE: 9 INJECTION, SOLUTION INTRAVENOUS at 16:46

## 2022-07-05 RX ADMIN — SODIUM CHLORIDE, SODIUM LACTATE, POTASSIUM CHLORIDE, AND CALCIUM CHLORIDE 500 ML: 600; 310; 30; 20 INJECTION, SOLUTION INTRAVENOUS at 23:30

## 2022-07-05 RX ADMIN — FAMOTIDINE 20 MG: 10 INJECTION INTRAVENOUS at 22:06

## 2022-07-05 ASSESSMENT — ACTIVITIES OF DAILY LIVING (ADL)
ADLS_ACUITY_SCORE: 53
ADLS_ACUITY_SCORE: 57
ADLS_ACUITY_SCORE: 57
ADLS_ACUITY_SCORE: 52
ADLS_ACUITY_SCORE: 52
ADLS_ACUITY_SCORE: 57
ADLS_ACUITY_SCORE: 52
ADLS_ACUITY_SCORE: 57
ADLS_ACUITY_SCORE: 52
ADLS_ACUITY_SCORE: 53
ADLS_ACUITY_SCORE: 57
ADLS_ACUITY_SCORE: 52

## 2022-07-05 NOTE — PROGRESS NOTES
Code Blue Called Arrived started bagging Patient and Sxn was done patient then Intubated with a 7.0, 24 Lip line patient transferred to ICU and placed on Vent patient stable at this time .    RT Heron on 7/5/2022 at 11:43 AM

## 2022-07-05 NOTE — PLAN OF CARE
Patient received a second 500 mL bolus and rocephin.  Repeat lactic acid within normal limits.  Patient slept well throughout most of night.  Did have some confusion and needing some redirection.   Denies any pain or discomfort.  Dressing to coccyx/buttocks changed, eschar and some sloughing present.  Nystatin to reddened groin.  VSS, afebrile.     Problem: Plan of Care - These are the overarching goals to be used throughout the patient stay.    Goal: Plan of Care Review/Shift Note    Outcome: Ongoing, Progressing  Flowsheets (Taken 7/5/2022 0505)  Plan of Care Reviewed With: patient  Overall Patient Progress: improving  Goal: Absence of Hospital-Acquired Illness or Injury  Intervention: Identify and Manage Fall Risk  Recent Flowsheet Documentation  Taken 7/5/2022 0156 by Krystina Lorenzana RN  Safety Promotion/Fall Prevention:   activity supervised   assistive device/personal items within reach   bed alarm on   clutter free environment maintained   fall prevention program maintained   nonskid shoes/slippers when out of bed   patient and family education   room organization consistent   room near nurse's station   safety round/check completed   supervised activity   treat reversible contributory factors   treat underlying cause   room door open   increase visualization of patient  Taken 7/4/2022 1930 by Krystina Lorenzana, RN  Safety Promotion/Fall Prevention:   activity supervised   assistive device/personal items within reach   bed alarm on   clutter free environment maintained   fall prevention program maintained   nonskid shoes/slippers when out of bed   patient and family education   room organization consistent   room near nurse's station   safety round/check completed   supervised activity   treat reversible contributory factors   treat underlying cause   room door open   increase visualization of patient  Intervention: Prevent Skin Injury  Recent Flowsheet Documentation  Taken 7/5/2022 0156 by Harriett  Krystina DE GUZMAN RN  Body Position:   weight shifting   turned   log-rolled   position changed independently   supine, head elevated   left  Intervention: Prevent and Manage VTE (Venous Thromboembolism) Risk  Recent Flowsheet Documentation  Taken 7/5/2022 0156 by Krystina Lorenzana RN  VTE Prevention/Management: SCDs (sequential compression devices) off  Taken 7/4/2022 1930 by Krystina Lorenzana RN  VTE Prevention/Management: SCDs (sequential compression devices) off  Intervention: Prevent Infection  Recent Flowsheet Documentation  Taken 7/5/2022 0156 by Krystina Lorenzana RN  Infection Prevention:   hand hygiene promoted   rest/sleep promoted   single patient room provided  Taken 7/4/2022 1930 by Krystina Lorenzana RN  Infection Prevention:   hand hygiene promoted   rest/sleep promoted   single patient room provided  Goal: Optimal Comfort and Wellbeing  Intervention: Provide Person-Centered Care  Recent Flowsheet Documentation  Taken 7/5/2022 0156 by Krystina Lorenzana RN  Trust Relationship/Rapport:   care explained   choices provided   emotional support provided   empathic listening provided   questions encouraged   questions answered   reassurance provided   thoughts/feelings acknowledged  Taken 7/4/2022 1930 by Krystina Lorenzana RN  Trust Relationship/Rapport:   care explained   choices provided   emotional support provided   empathic listening provided   questions encouraged   questions answered   reassurance provided   thoughts/feelings acknowledged     Problem: Fall Injury Risk  Goal: Absence of Fall and Fall-Related Injury  Intervention: Identify and Manage Contributors  Recent Flowsheet Documentation  Taken 7/5/2022 0156 by Krystina Lorenzana RN  Medication Review/Management:   medications reviewed   high-risk medications identified  Taken 7/4/2022 1930 by Krystina Lorenzana RN  Medication Review/Management:   medications reviewed   high-risk medications identified  Intervention: Promote  Injury-Free Environment  Recent Flowsheet Documentation  Taken 7/5/2022 0156 by Krystina Lorenzana RN  Safety Promotion/Fall Prevention:   activity supervised   assistive device/personal items within reach   bed alarm on   clutter free environment maintained   fall prevention program maintained   nonskid shoes/slippers when out of bed   patient and family education   room organization consistent   room near nurse's station   safety round/check completed   supervised activity   treat reversible contributory factors   treat underlying cause   room door open   increase visualization of patient  Taken 7/4/2022 1930 by Krystina Lorenzana RN  Safety Promotion/Fall Prevention:   activity supervised   assistive device/personal items within reach   bed alarm on   clutter free environment maintained   fall prevention program maintained   nonskid shoes/slippers when out of bed   patient and family education   room organization consistent   room near nurse's station   safety round/check completed   supervised activity   treat reversible contributory factors   treat underlying cause   room door open   increase visualization of patient     Problem: Behavioral Health Comorbidity  Goal: Maintenance of Behavioral Health Symptom Control  Intervention: Maintain Behavioral Health Symptom Control  Recent Flowsheet Documentation  Taken 7/5/2022 0156 by Krystina Lorenzana RN  Medication Review/Management:   medications reviewed   high-risk medications identified  Taken 7/4/2022 1930 by Krystina Lorenzana RN  Medication Review/Management:   medications reviewed   high-risk medications identified     Problem: Restraint, Nonbehavioral (Nonviolent)  Goal: Absence of Harm or Injury  Intervention: Protect Dignity, Rights, and Personal Wellbeing  Recent Flowsheet Documentation  Taken 7/5/2022 0156 by Krystina Lorenzana RN  Trust Relationship/Rapport:   care explained   choices provided   emotional support provided   empathic listening  provided   questions encouraged   questions answered   reassurance provided   thoughts/feelings acknowledged  Taken 7/4/2022 1930 by Krystina Lorenzana, RN  Trust Relationship/Rapport:   care explained   choices provided   emotional support provided   empathic listening provided   questions encouraged   questions answered   reassurance provided   thoughts/feelings acknowledged  Intervention: Protect Skin and Joint Integrity  Recent Flowsheet Documentation  Taken 7/5/2022 0156 by Krystina Lorenzana, RN  Body Position:   weight shifting   turned   log-rolled   position changed independently   supine, head elevated   left   Goal Outcome Evaluation:    Plan of Care Reviewed With: patient     Overall Patient Progress: improving

## 2022-07-05 NOTE — PROGRESS NOTES
Patient still with ventilator dyssynchrony on 70 of propofol.  Will add fentanyl 100 q 1 h prn and then determine need for continuous infusion.      -------------------------------------------------------  3:56 PM  Updated Note  camera'ed in to see patient.  Tachypneic and tachycardic but not awake after 100 of fentanyl.     Will add versed since at max propofol dose .

## 2022-07-05 NOTE — PROGRESS NOTES
73 yo M in hospital for two weeks for CHARLOTTE, encephalopathy, alcohol withdraw which were also slowly improving found unresponsive in bed today without pulse.  Got CPR with return of spontaneous circulation within 6 minutes, intubated.  No ventricular arrhythmia seen. Labs this morning before event were ok. Has not been receiving sedative medications.Had right sided PE seen on 6/17 but has been on AC for>2  weeks.   Just started on ceftriaxone for low grade fever.  CXR before arrest with clear lungs.  Post arrest same with ETT.      Ventilator set at 14 x 450 PEEP  5 100%.     A: PEA of unclear etiology.  Seems less likely for another PE but would scan chest again once stabilized.     P: Will monitor for arrhythmias.   RN has called stating patient is fighting the ventilator so will use propofol  Cooling not indicated  Await labs.       D/w Dr Menjivar and ICU RN     -------------------------------------------------------  12:34 PM  Updated Note  Recent Labs   Lab 07/05/22  1218   PH 7.30*   PCO2 50*   PO2 414*   HCO3 25   O2PER 100       Minimal elevation in transaminases.  Trop negative.  Hgb stable.    P: change to 16 x 500  40%.      Usually keep this type of patient on ventilator for at least 24 hours to assess stability and then if ok and able to follow commands, extubate in AM.

## 2022-07-05 NOTE — PROGRESS NOTES
Patient transferred to ICU after Code was called on medical. Patient intubated and titrating sedation. Report received from Fabricio HIGGINS. Patient placed in restraints and being mechanically ventilated at this time. Isaac Smith RN on 7/5/2022 at 12:21 PM

## 2022-07-05 NOTE — PLAN OF CARE
Problem: Fall Injury Risk  Goal: Absence of Fall and Fall-Related Injury  Outcome: Ongoing, Not Progressing  Intervention: Identify and Manage Contributors  Recent Flowsheet Documentation  Taken 7/5/2022 1238 by Isaac Smith RN  Medication Review/Management:   medications reviewed   high-risk medications identified  Intervention: Promote Injury-Free Environment  Recent Flowsheet Documentation  Taken 7/5/2022 1629 by Isaac Smith RN  Safety Promotion/Fall Prevention: safety round/check completed  Taken 7/5/2022 1238 by Luis, Isaac, RN  Safety Promotion/Fall Prevention:   activity supervised   assistive device/personal items within reach   bed alarm on   clutter free environment maintained   fall prevention program maintained   patient and family education   room organization consistent   room near nurse's station   safety round/check completed   supervised activity   treat reversible contributory factors   treat underlying cause   room door open   increase visualization of patient   increased rounding and observation   lighting adjusted     Problem: Behavioral Health Comorbidity  Goal: Maintenance of Behavioral Health Symptom Control  Outcome: Ongoing, Not Progressing  Intervention: Maintain Behavioral Health Symptom Control  Recent Flowsheet Documentation  Taken 7/5/2022 1238 by Isaac Smith RN  Medication Review/Management:   medications reviewed   high-risk medications identified     Problem: Alcohol Withdrawal  Goal: Alcohol Withdrawal Symptom Control  Outcome: Ongoing, Not Progressing     Problem: Oral Intake Inadequate  Goal: Optimal Oral Intake  Outcome: Ongoing, Not Progressing     Problem: Restraint, Nonbehavioral (Nonviolent)  Goal: Absence of Harm or Injury  Outcome: Ongoing, Not Progressing  Intervention: Implement Least Restrictive Safety Strategies  Recent Flowsheet Documentation  Taken 7/5/2022 1629 by Isaac Smith RN  Medical Device Protection: IV pole/bag  removed from visual field  Taken 7/5/2022 1238 by Isaac Smith, RN  Medical Device Protection: IV pole/bag removed from visual field  Intervention: Protect Dignity, Rights, and Personal Wellbeing  Recent Flowsheet Documentation  Taken 7/5/2022 1200 by Isaac Smith RN  Trust Relationship/Rapport: care explained  Intervention: Protect Skin and Joint Integrity  Recent Flowsheet Documentation  Taken 7/5/2022 1626 by Isaac Smith, RN  Body Position:   turned   heels elevated   supine, head elevated   weight shifting  Taken 7/5/2022 1403 by Isaac Smith RN  Body Position:   turned   right   heels elevated   supine, head elevated   weight shifting  Taken 7/5/2022 1238 by Isaac Smith, RN  Body Position: weight shifting   Goal Outcome Evaluation:

## 2022-07-05 NOTE — PROGRESS NOTES
Last known well during medication administration at 1045.  At 1105 was found to be unresponsive with bloody foam at the mouth.  No pulses palpable but patient on monitor seem to have sinus rhythm consistent with PEA.    CPR initiated.  This continued for approximately 5 minutes.  2 doses of epinephrine were given 3 minutes apart.  Frantz system was used after about 1 minute of manual compressions.  Patient was intubated.    Patient had ROSC at 5 minutes.  At that time rhythm appeared to be sinus tachycardia.  He was biting at tube but otherwise unresponsive.    Reviewed patient's MAR.  He was given vitamins and metoprolol at that time but no sedating medications.    Will draw blood cultures x2, CMP, CBC, CRP, ABG, troponin.  CXR and EKG.    Will call and update family.  Patient will be transferred to ICU.  We will also call and update telehealth critical care.    Karl Menjivar MD     I called and updated his son Jorge who confirmed patient would want all available interventions.  I did explain to him his prognosis which is poor.  Despite early ROSC with excellent CPR given his age he has roughly a 20% chance of hospital discharge and probably about a 10% chance of returning to previous quality of life. Prior to this event he had prolonged hospitalization and has been very slow to recover both mental faculties and physical ability to perform any ADLs.      Son is aware of that as well as that prior to hospitalization was in [facility] with inability to perform ADLs and unwilling or able to eat or drink and prior to that at home was an exceedingly heavy drinker.    I did call and talk with Dr. Pérez critical care specialist through telehealth.  Appreciate his care and expertise.    37 minutes total spent in critical care    Karl Menjivar MD

## 2022-07-05 NOTE — PROGRESS NOTES
:     Patient is from [facility]. Plan for patient to return to [facility] at the time of discharge.     Anticipated discharge is many days.      will continue to follow for discharge planning needs.     REECE Rodriguez on 7/5/2022 at 1:31 PM

## 2022-07-05 NOTE — PROGRESS NOTES
Shift Summary   Early Shitf Code blue Called Patient Intubated and taken to ICU and placed on Vent 16 500 +5 30% patient remained stable throughout the shift no other changes made.      Johnny Weldon, RT on 7/5/2022 at 6:54 PM

## 2022-07-05 NOTE — PROGRESS NOTES
"Pt is alert to self, he is not alert to time, place or situation. Pt denies pain this shift, VS have been stable. CIWAs have been 0, LS are clear, cardiac is WDL. Provider will assess pt's sores to bottom when dressings are changed. GI is WDL, catheter is patent, urine is dark boubacar and clear.        BP (!) 148/66 (BP Location: Right arm, Patient Position: Semi-Fitzgerald's, Cuff Size: Adult Regular)   Pulse 78   Temp 98.6  F (37  C) (Tympanic)   Resp 16   Ht 1.778 m (5' 10\")   Wt 86.2 kg (190 lb 0.6 oz)   SpO2 95%   BMI 27.27 kg/m      "

## 2022-07-05 NOTE — PROGRESS NOTES
Tele ICU contacted due to elevated BP, HR, RR and lack of adequate sedation. Order for PRN fentanyl ordered and given and had limited effect. Tele ICU again contacted for ongoing increased vitals and they camera'ed in and visualized the patient. Order for PRN versed was given and patient has been adequately sedated since. Patient had OG placed and hooked up to low-int suction and bile was returned. Patient family came to visit and all questions were answer. Patient has multiple hospital acquired wounds to coccyx and perineum. Blister to BIENVENIDO robles. Isaac Smith RN on 7/5/2022 at 6:11 PM

## 2022-07-05 NOTE — PROGRESS NOTES
Right wrist and Left wrist restraints initiated on patient on 7/5/2022 at 12:25 PM    Clinical Justification: Pulling lines, pulling tubes, and pulling equipment  Less Restrictive Alternative: 1:1 patient care, De-escalation, Reorientation  Attending Physician Notified: MD ordered restraint, Attending Physician's Name: Dr. Brock   Order received: Yes     Family Notification: Spouse/significant other   Criteria explained to Patient  Patient's Response: Needs reinforcement  Restraint care Plan initiated: Yes    Isaac Smith RN

## 2022-07-05 NOTE — PROGRESS NOTES
[facility]    Medicine Progress Note - Hospitalist Service    Date of Admission:  6/17/2022    Assessment & Plan                       Antonio Rutherford is a 72 year old male admitted on 6/17/2022. [facility] presented from the [facility] for concerns of possible kidney stone versus sepsis.     Patient was admitted to the [facility] 7 days PTA.  He is a daily drinker of whiskey and was treated for alcohol withdrawal at the [facility] and then started on Bactrim for possible UTI 4 days PTA.  He had extremely poor oral intake PTA (20 oz in 7d per [facility] nurse report).     Patient was admitted treated with thiamine, folate and IV fluids, strict I's and O's and close monitoring.  Developed worsening confusion and somnolence and concern to protect his airway.  Got intubated on 6/21/2022, extubated 6/24/2022.    For the past few days has had slow daily improvement in his mental status and functionality.  On 7/4 patient with significant cognitive improvement in the morning.  Was answering all questions appropriately and seemed to really be following the discussion although was amnestic of previous events in the hospital.  Later that day however became somnolent and had low-grade fever.  UA suggestive of infection.  Started on Rocephin.    This morning answers questions but is again somnolent.         Alcohol dependence with withdrawal delirium (H):   Assessment: Resolved.  Sedated and intubated in the ICU on 6/21/22 for somnolence, extubated on 6/24/2022. Ammonia level was normal, ABG did not show significant CO2 retention, head CT and brain MRI neg for acute findings. Repeat CT of the head negative.    Melatonin/Seroquel at night.    -Discontinued CIWA on 6/27  -Minimize sedating medication  -In discussion with social work patient does need to return to [facility] on discharge.  There is court proceedings determining if he would be allowed to go to skilled nursing facility next week.     Encephalopathy, metabolic.  CT and MRI head/brain  negative for acute findings. Likely due to ICU acquired delirium at this time.  Cannot exclude encephalopathy , he should be out of the timeframe for withdrawal.    Today has had significant improvement in mental status.  Hopefully that continues.  Plan:   -Delirium order set  -Appreciate PT/OT input for dispo needs     Stage 1 pressure ulcer of buttocks with area of unstageable eschar  Heel pressure ulcer stage 1. Left foot.  Present on admission  -offload   -Air mattress  -Continue Escobedo to minimize skin breakdown     Acute PE, POA: Diagnosed 6/17, likely provoked from immobility and acute illness.  -Initially started on lovenox 1.5mg/kg/d now able to take oral medication  -Continue Xarelto for PE treatment     Aortic valve echodensity: Probable chronic calcific nodule, could also represent small clot, unlikely to represent infective endocarditis process with initial negative blood cultures.  No evidence of CVA given MRI findings and at this point given how critically ill he has been will anticoagulate as above, monitor for clinical improvement and consider repeat ELLEN as an outpatient to ensure stability/resolution.  -Follow-up repeat blood cultures showed no growth     CHARLOTTE due to ATN (acute tubular necrosis) (H): Resolved, creatinine peaked at 2.7, likely from prerenal component from hypovolemia and infection and possible ATN from Bactrim/ibuprofen prior to admission.    Plan:  -CMP unremarkable yesterday with resolution of CHARLOTTE.  -avoid NSAIDs and nephrotoxins     Community-acquired versus aspiration pneumonia: Possibly contributing to need for intubation with possible component of aspiration given alcohol abuse  -Completed 7-day course of Zosyn     UTI. POA: Resolved.  Empirically treated with Bactrim, Urine culture negative.  No growth in initial urine culture. Blood cultures negative.   Now with low-grade fever and UA suggestive of possible bacterial infection.  Rocephin 1 g daily started.  No leukocytosis  this morning.  Slightly elevated lactate resolved with IV fluids and antibiotics overnight.     Essential hypertension: Stable.  -Continue as needed hydralazine and-labetalol  -Continue home ACE  -Continue metoprolol 25 mg twice daily     Severe protein calorie malnutrition: Generally poor oral intake.    -Inpatient dietary eval      Sinus tachycardia: Resolved, likely from withdrawal.  Got amiodarone x24 hours. Now HR regular and fairly well controlled, moderate elevation especially with agitation.   Plan:   -Discontinue telemetry      diarrhea: Resolved  -C. difficile and stool culture negative.    -Imodium as needed     Smoker.  No wheezing or contribution of COPD exacerbation during his stay      Hypokalemia  Likely from poor oral intake.  Improved.  Replace per protocol-    Malnutrition  Prealbumin has now fallen down to 12.   Patient needs assistance with eating at this time.  Continue with high-calorie supplements.  Folate and B12 were normal.                          Diet: Minced & Moist Diet (level 5) Thin Liquids (level 0)  Snacks/Supplements Adult: Ensure Enlive; With Meals    DVT Prophylaxis: DOAC  Escobedo Catheter: PRESENT, indication: Wound Healing, Strict 1-2 Hour I&O  Central Lines: None  Cardiac Monitoring: None  Code Status: Full Code      Disposition Plan    plan is for patient to return to residential when medically able     The patient's care was discussed with the Patient.    Karl Menjivar MD  Hospitalist Service  [facility]  Securely message with the Vocera Web Console (learn more here)  Text page via [employer] Paging/Directory         Clinically Significant Risk Factors Present on Admission                      ______________________________________________________________________    Interval History   Patient had an excellent day yesterday morning into early afternoon.  Then became more agitated and then more somnolent.  Had low-grade fever.  Urine had become more concentrated.   He was not coughing or seeming short of breath.  Started on antibiotics.  No recurrent fever.    This morning he is able to answer questions but is somnolent.  Tells me he has no pain.  No shortness of breath.  He knows who he is and that he is in the hospital but he has no idea why.    Data reviewed today: I reviewed all medications, new labs and imaging results over the last 24 hours. I personally reviewed no images or EKG's today.    Physical Exam   Vital Signs: Temp: 98.6  F (37  C) Temp src: Tympanic BP: (!) 148/66 Pulse: 78   Resp: 16 SpO2: 95 % O2 Device: None (Room air)    Weight: 190 lbs .58 oz  Constitutional: Somnolent but arousable.  No distress  Respiratory: Clear to auscultation bilaterally  Cardiovascular: Regular rate and rhythm.  No lower extremity edema.  GI: Abdomen soft, nontender  Neuropsychiatric: Alert and oriented to person and place but not time or situation    Data   Recent Labs   Lab 07/05/22  0738 07/05/22  0616 07/04/22  2121 07/04/22  1851 07/04/22  0724 07/04/22  0541 07/03/22  1114 07/03/22  0921 07/02/22  1108 07/02/22  0736   WBC  --  6.6  --  8.1  --   --   --  7.1  --  7.7   HGB  --  13.0*  --  13.8  --   --   --  13.7  --  14.0   MCV  --  102*  --  101*  --   --   --  110*  --  102*   PLT  --  194  --  230  --   --   --  225  --  306   NA  --  142  --   --   --   --   --  135  --  140   POTASSIUM  --  3.8  --   --   --  3.8  --  3.5  --  3.8   CHLORIDE  --  105  --   --   --   --   --  103  --  105   CO2  --  28  --   --   --   --   --  21  --  23   BUN  --  26*  --   --   --   --   --  27*  --  25   CR  --  1.15  --   --   --   --   --  1.16  --  1.02   ANIONGAP  --  9  --   --   --   --   --  11  --  12   PAWAN  --  8.7  --   --   --   --   --  8.5*  --  8.7   GLC 84 83 90  --    < >  --    < > 85   < > 82   ALBUMIN  --   --   --   --   --   --   --  3.2*  --  3.2*   PROTTOTAL  --   --   --   --   --   --   --  6.1*  --  5.9*   BILITOTAL  --   --   --   --   --   --   --  0.6  --   0.6   ALKPHOS  --   --   --   --   --   --   --  40  --  40   ALT  --   --   --   --   --   --   --  48  --  51   AST  --   --   --   --   --   --   --  30  --  41*    < > = values in this interval not displayed.     Recent Results (from the past 24 hour(s))   XR Chest Port 1 View    Narrative    PROCEDURE:  XR CHEST PORT 1 VIEW    HISTORY: fever. .    COMPARISON:  None.    FINDINGS:    The cardiomediastinal contours are stable.  No focal consolidation, effusion or pneumothorax.      Impression    IMPRESSION:  No acute cardiopulmonary disease seen.      JEROD POWELL MD         SYSTEM ID:  [id number]

## 2022-07-05 NOTE — PROGRESS NOTES
Code blue called, assisted in with compressions and ventilations. Pt transferred to ICU. Report given to RONALDO Gray.

## 2022-07-06 ENCOUNTER — APPOINTMENT (OUTPATIENT)
Dept: CARDIOLOGY | Facility: OTHER | Age: 72
DRG: 673 | End: 2022-07-06
Attending: INTERNAL MEDICINE
Payer: MEDICARE

## 2022-07-06 ENCOUNTER — APPOINTMENT (OUTPATIENT)
Dept: GENERAL RADIOLOGY | Facility: OTHER | Age: 72
DRG: 673 | End: 2022-07-06
Attending: SURGERY
Payer: MEDICARE

## 2022-07-06 ENCOUNTER — ANESTHESIA (OUTPATIENT)
Dept: INTENSIVE CARE | Facility: OTHER | Age: 72
DRG: 673 | End: 2022-07-06
Payer: MEDICARE

## 2022-07-06 ENCOUNTER — ANESTHESIA EVENT (OUTPATIENT)
Dept: INTENSIVE CARE | Facility: OTHER | Age: 72
DRG: 673 | End: 2022-07-06
Payer: MEDICARE

## 2022-07-06 LAB
ANION GAP SERPL CALCULATED.3IONS-SCNC: 13 MMOL/L (ref 3–14)
BASE EXCESS BLDA CALC-SCNC: -1.9 MMOL/L (ref -9–1.8)
BUN SERPL-MCNC: 31 MG/DL (ref 7–25)
CALCIUM SERPL-MCNC: 7.9 MG/DL (ref 8.6–10.3)
CHLORIDE BLD-SCNC: 105 MMOL/L (ref 98–107)
CO2 SERPL-SCNC: 21 MMOL/L (ref 21–31)
CREAT SERPL-MCNC: 1.54 MG/DL (ref 0.7–1.3)
ERYTHROCYTE [DISTWIDTH] IN BLOOD BY AUTOMATED COUNT: 13.7 % (ref 10–15)
GFR SERPL CREATININE-BSD FRML MDRD: 47 ML/MIN/1.73M2
GLUCOSE BLD-MCNC: 107 MG/DL (ref 70–105)
GLUCOSE BLDC GLUCOMTR-MCNC: 91 MG/DL (ref 70–99)
HCO3 BLD-SCNC: 24 MMOL/L (ref 21–28)
HCT VFR BLD AUTO: 36 % (ref 40–53)
HGB BLD-MCNC: 12 G/DL (ref 13.3–17.7)
LVEF ECHO: NORMAL
MCH RBC QN AUTO: 34.2 PG (ref 26.5–33)
MCHC RBC AUTO-ENTMCNC: 33.3 G/DL (ref 31.5–36.5)
MCV RBC AUTO: 103 FL (ref 78–100)
O2/TOTAL GAS SETTING VFR VENT: 30 %
OXYHGB MFR BLD: 92 % (ref 92–100)
PCO2 BLD: 42 MM HG (ref 35–45)
PH BLD: 7.36 [PH] (ref 7.35–7.45)
PLATELET # BLD AUTO: 220 10E3/UL (ref 150–450)
PO2 BLD: 69 MM HG (ref 80–105)
POTASSIUM BLD-SCNC: 4.3 MMOL/L (ref 3.5–5.1)
RBC # BLD AUTO: 3.51 10E6/UL (ref 4.4–5.9)
SODIUM SERPL-SCNC: 139 MMOL/L (ref 134–144)
WBC # BLD AUTO: 18 10E3/UL (ref 4–11)

## 2022-07-06 PROCEDURE — 250N000009 HC RX 250: Performed by: INTERNAL MEDICINE

## 2022-07-06 PROCEDURE — 36556 INSERT NON-TUNNEL CV CATH: CPT | Performed by: SURGERY

## 2022-07-06 PROCEDURE — 250N000013 HC RX MED GY IP 250 OP 250 PS 637: Performed by: FAMILY MEDICINE

## 2022-07-06 PROCEDURE — 03HY32Z INSERTION OF MONITORING DEVICE INTO UPPER ARTERY, PERCUTANEOUS APPROACH: ICD-10-PCS | Performed by: FAMILY MEDICINE

## 2022-07-06 PROCEDURE — 250N000011 HC RX IP 250 OP 636: Performed by: INTERNAL MEDICINE

## 2022-07-06 PROCEDURE — 999N000157 HC STATISTIC RCP TIME EA 10 MIN

## 2022-07-06 PROCEDURE — 200N000001 HC R&B ICU

## 2022-07-06 PROCEDURE — 93306 TTE W/DOPPLER COMPLETE: CPT

## 2022-07-06 PROCEDURE — 94003 VENT MGMT INPAT SUBQ DAY: CPT

## 2022-07-06 PROCEDURE — 87086 URINE CULTURE/COLONY COUNT: CPT | Performed by: INTERNAL MEDICINE

## 2022-07-06 PROCEDURE — 80048 BASIC METABOLIC PNL TOTAL CA: CPT | Performed by: FAMILY MEDICINE

## 2022-07-06 PROCEDURE — 36600 WITHDRAWAL OF ARTERIAL BLOOD: CPT | Performed by: FAMILY MEDICINE

## 2022-07-06 PROCEDURE — 250N000011 HC RX IP 250 OP 636: Performed by: FAMILY MEDICINE

## 2022-07-06 PROCEDURE — 250N000009 HC RX 250: Performed by: FAMILY MEDICINE

## 2022-07-06 PROCEDURE — 82805 BLOOD GASES W/O2 SATURATION: CPT | Performed by: FAMILY MEDICINE

## 2022-07-06 PROCEDURE — 99291 CRITICAL CARE FIRST HOUR: CPT | Performed by: FAMILY MEDICINE

## 2022-07-06 PROCEDURE — 258N000003 HC RX IP 258 OP 636: Performed by: FAMILY MEDICINE

## 2022-07-06 PROCEDURE — 3E0436Z INTRODUCTION OF NUTRITIONAL SUBSTANCE INTO CENTRAL VEIN, PERCUTANEOUS APPROACH: ICD-10-PCS | Performed by: FAMILY MEDICINE

## 2022-07-06 PROCEDURE — 85027 COMPLETE CBC AUTOMATED: CPT | Performed by: FAMILY MEDICINE

## 2022-07-06 PROCEDURE — 36620 INSERTION CATHETER ARTERY: CPT | Performed by: NURSE ANESTHETIST, CERTIFIED REGISTERED

## 2022-07-06 PROCEDURE — 71045 X-RAY EXAM CHEST 1 VIEW: CPT

## 2022-07-06 PROCEDURE — 93306 TTE W/DOPPLER COMPLETE: CPT | Mod: 26 | Performed by: INTERNAL MEDICINE

## 2022-07-06 PROCEDURE — 94640 AIRWAY INHALATION TREATMENT: CPT

## 2022-07-06 PROCEDURE — 05H433Z INSERTION OF INFUSION DEVICE INTO LEFT INNOMINATE VEIN, PERCUTANEOUS APPROACH: ICD-10-PCS | Performed by: SURGERY

## 2022-07-06 PROCEDURE — 250N000013 HC RX MED GY IP 250 OP 250 PS 637: Performed by: INTERNAL MEDICINE

## 2022-07-06 RX ORDER — PIPERACILLIN SODIUM, TAZOBACTAM SODIUM 4; .5 G/20ML; G/20ML
4.5 INJECTION, POWDER, LYOPHILIZED, FOR SOLUTION INTRAVENOUS EVERY 6 HOURS
Status: DISCONTINUED | OUTPATIENT
Start: 2022-07-06 | End: 2022-07-08 | Stop reason: CLARIF

## 2022-07-06 RX ORDER — IPRATROPIUM BROMIDE AND ALBUTEROL SULFATE 2.5; .5 MG/3ML; MG/3ML
3 SOLUTION RESPIRATORY (INHALATION)
Status: DISCONTINUED | OUTPATIENT
Start: 2022-07-07 | End: 2022-07-12 | Stop reason: HOSPADM

## 2022-07-06 RX ORDER — METHYLPREDNISOLONE SODIUM SUCCINATE 40 MG/ML
40 INJECTION, POWDER, LYOPHILIZED, FOR SOLUTION INTRAMUSCULAR; INTRAVENOUS EVERY 8 HOURS
Status: COMPLETED | OUTPATIENT
Start: 2022-07-06 | End: 2022-07-07

## 2022-07-06 RX ORDER — PHENTOLAMINE MESYLATE 5 MG/1
5 INJECTION INTRAMUSCULAR; INTRAVENOUS ONCE
Status: COMPLETED | OUTPATIENT
Start: 2022-07-06 | End: 2022-07-06

## 2022-07-06 RX ORDER — DEXTROSE MONOHYDRATE 100 MG/ML
INJECTION, SOLUTION INTRAVENOUS CONTINUOUS PRN
Status: DISCONTINUED | OUTPATIENT
Start: 2022-07-06 | End: 2022-07-10

## 2022-07-06 RX ADMIN — PROPOFOL 30 MCG/KG/MIN: 10 INJECTION, EMULSION INTRAVENOUS at 19:17

## 2022-07-06 RX ADMIN — Medication 4.5 G: at 20:49

## 2022-07-06 RX ADMIN — PROPOFOL 70 MCG/KG/MIN: 10 INJECTION, EMULSION INTRAVENOUS at 22:31

## 2022-07-06 RX ADMIN — PHENTOLAMINE MESYLATE 5 MG: 5 INJECTION, POWDER, FOR SOLUTION INTRAMUSCULAR; INTRAVENOUS at 12:57

## 2022-07-06 RX ADMIN — PROPOFOL 50 MCG/KG/MIN: 10 INJECTION, EMULSION INTRAVENOUS at 13:38

## 2022-07-06 RX ADMIN — FOLIC ACID 1 MG: 1 TABLET ORAL at 10:30

## 2022-07-06 RX ADMIN — Medication 100 MCG: at 22:30

## 2022-07-06 RX ADMIN — NYSTATIN: 100000 POWDER TOPICAL at 00:19

## 2022-07-06 RX ADMIN — MIDAZOLAM 2 MG: 1 INJECTION INTRAMUSCULAR; INTRAVENOUS at 20:46

## 2022-07-06 RX ADMIN — Medication 25 MCG/HR: at 00:00

## 2022-07-06 RX ADMIN — PROPOFOL 35 MCG/KG/MIN: 10 INJECTION, EMULSION INTRAVENOUS at 10:00

## 2022-07-06 RX ADMIN — THIAMINE HCL TAB 100 MG 300 MG: 100 TAB at 10:30

## 2022-07-06 RX ADMIN — NYSTATIN: 100000 POWDER TOPICAL at 22:39

## 2022-07-06 RX ADMIN — RIVAROXABAN 15 MG: 15 TABLET, FILM COATED ORAL at 12:12

## 2022-07-06 RX ADMIN — PROPOFOL 45 MCG/KG/MIN: 10 INJECTION, EMULSION INTRAVENOUS at 05:15

## 2022-07-06 RX ADMIN — ALBUTEROL SULFATE 2.5 MG: 2.5 SOLUTION RESPIRATORY (INHALATION) at 22:01

## 2022-07-06 RX ADMIN — PROPOFOL 55 MCG/KG/MIN: 10 INJECTION, EMULSION INTRAVENOUS at 01:49

## 2022-07-06 RX ADMIN — Medication 100 MCG: at 23:20

## 2022-07-06 RX ADMIN — Medication 4.5 G: at 14:59

## 2022-07-06 RX ADMIN — SODIUM CHLORIDE: 9 INJECTION, SOLUTION INTRAVENOUS at 04:45

## 2022-07-06 RX ADMIN — TAZOBACTAM SODIUM AND PIPERACILLIN SODIUM 4.5 G: 500; 4 INJECTION, SOLUTION INTRAVENOUS at 08:30

## 2022-07-06 RX ADMIN — METHYLPREDNISOLONE 40 MG: 40 INJECTION, POWDER, LYOPHILIZED, FOR SOLUTION INTRAMUSCULAR; INTRAVENOUS at 23:18

## 2022-07-06 RX ADMIN — RIVAROXABAN 15 MG: 15 TABLET, FILM COATED ORAL at 23:19

## 2022-07-06 RX ADMIN — FAMOTIDINE 20 MG: 20 TABLET ORAL at 10:30

## 2022-07-06 RX ADMIN — Medication 1 TABLET: at 10:30

## 2022-07-06 RX ADMIN — Medication 0.06 MCG/KG/MIN: at 18:18

## 2022-07-06 RX ADMIN — VANCOMYCIN HYDROCHLORIDE 1250 MG: 500 INJECTION, POWDER, LYOPHILIZED, FOR SOLUTION INTRAVENOUS at 12:04

## 2022-07-06 RX ADMIN — FAMOTIDINE 20 MG: 10 INJECTION INTRAVENOUS at 23:18

## 2022-07-06 RX ADMIN — NYSTATIN: 100000 POWDER TOPICAL at 16:25

## 2022-07-06 ASSESSMENT — ACTIVITIES OF DAILY LIVING (ADL)
ADLS_ACUITY_SCORE: 53
ADLS_ACUITY_SCORE: 57
ADLS_ACUITY_SCORE: 57
ADLS_ACUITY_SCORE: 53
ADLS_ACUITY_SCORE: 51
ADLS_ACUITY_SCORE: 53
ADLS_ACUITY_SCORE: 57
ADLS_ACUITY_SCORE: 53
ADLS_ACUITY_SCORE: 51

## 2022-07-06 NOTE — ANESTHESIA PROCEDURE NOTES
Arterial Line Procedure Note    Pre-Procedure   Staff -        CRNA: Isak May APRN CRNA       Performed By: CRNA       Location: ICU       Pre-Anesthestic Checklist: patient identified, monitors and equipment checked and at physician/surgeon's request  Timeout:       Correct Patient: Yes        Correct Procedure: Yes        Correct Site: Yes        Correct Position: Yes   Line Placement:   This line was placed Post Induction starting at 7/6/2022 6:08 PM and ending at 7/6/2022 6:29 PM  Procedure   Procedure: arterial line       Laterality: right       Insertion Site: radial.  Sterile Prep        Standard elements of sterile barrier followed       Skin prep: Chloraprep  Insertion/Injection        Technique: ultrasound guided        1. Ultrasound was used to evaluate the access site.       2. Artery evaluated via ultrasound for patency/adequacy.       3. Using real-time ultrasound the needle/catheter was observed entering the artery/vein.       5. The visualized structures were anatomically normal.       6. There were no apparent abnormal pathologic findings.       Catheter Type/Size: 20 G, 1.75 in/4.5 cm quick cath (integral wire)  Narrative         Secured by: suture       Tegaderm dressing used.       Complications: None apparent,        Arterial waveform: Yes        IBP within 10% of NIBP: Yes

## 2022-07-06 NOTE — PROGRESS NOTES
:     Call placed to patients  to discuss patients discharge plan. No answer. Left voicemail.      will continue to follow for discharge planning needs.     REECE Rodriguez on 7/6/2022 at 10:17 AM     :     Spoke on the with patients , Judith. She stated that she has not yet heard back from the court. She will update when she hears back from them.     REECE Rodriguez on 7/6/2022 at 10:27 AM     :     Received a call from patients . She stated that patient does not need to return to assisted at the time of discharge. She stated that patient is to go to a SNF if available. Update given to Dr. Mata.     REECE Rodriguez on 7/6/2022 at 12:54 PM      No complaints

## 2022-07-06 NOTE — ANESTHESIA PREPROCEDURE EVALUATION
"Anesthesia Pre-Procedure Evaluation    Patient: Antonio Rutherford   MRN: [id number] : 1950        Procedure : * No procedures listed *          Past Medical History:   Diagnosis Date     Alcohol abuse      Tobacco dependence       Past Surgical History:   Procedure Laterality Date     ESOPHAGOSCOPY, GASTROSCOPY, DUODENOSCOPY (EGD), COMBINED N/A 3/20/2019    Procedure: ESOPHAGOSCOPY, GASTROSCOPY, DUODENOSCOPY (EGD), Biuopsies and Dilation of Esophagus;  Surgeon: Izzy Cancino MD;  Location:  OR     ESOPHAGOSCOPY, GASTROSCOPY, DUODENOSCOPY (EGD), COMBINED N/A 2019    Procedure: ESOPHAGOGASTRODUODENOSCOPY, WITH BIOPSY;  Surgeon: Izzy Cancino MD;  Location:  OR     NO HISTORY OF SURGERY        Allergies   Allergen Reactions     Naproxen Other (See Comments)     Passed out with aleve    Tolerates ibuprofen 2022      Social History     Tobacco Use     Smoking status: Current Every Day Smoker     Packs/day: 1.50     Years: 62.00     Pack years: 93.00     Types: Cigarettes     Smokeless tobacco: Never Used     Tobacco comment: has cut back his smoking   Substance Use Topics     Alcohol use: Yes     Comment: Alcoholic Drinks/day: \"way too much\" 1 L per week      Wt Readings from Last 1 Encounters:   22 90.9 kg (200 lb 6.4 oz)              OUTSIDE LABS:  CBC:   Lab Results   Component Value Date    WBC 18.0 (H) 2022    WBC 12.7 (H) 2022    HGB 12.0 (L) 2022    HGB 12.9 (L) 2022    HCT 36.0 (L) 2022    HCT 39.0 (L) 2022     2022     2022     BMP:   Lab Results   Component Value Date     2022     2022    POTASSIUM 4.3 2022    POTASSIUM 3.7 2022    CHLORIDE 105 2022    CHLORIDE 101 2022    CO2 21 2022    CO2 26 2022    BUN 31 (H) 2022    BUN 26 (H) 2022    CR 1.54 (H) 2022    CR 1.30 2022    GLC 91 2022     (H) 2022     COAGS:   Lab Results "   Component Value Date    INR 1.13 06/28/2022     POC: No results found for: BGM, HCG, HCGS  HEPATIC:   Lab Results   Component Value Date    ALBUMIN 3.3 (L) 07/05/2022    PROTTOTAL 6.5 07/05/2022     (H) 07/05/2022     (H) 07/05/2022    ALKPHOS 61 07/05/2022    BILITOTAL 0.7 07/05/2022    MISSAEL 34 06/22/2022     OTHER:   Lab Results   Component Value Date    PH 7.36 07/06/2022    LACT 1.4 07/05/2022    PAWAN 7.9 (L) 07/06/2022    PHOS 3.1 07/05/2022    MAG 2.1 07/05/2022    CRP 20.0 (H) 06/27/2022    SED 77 (H) 06/27/2022       Anesthesia Plan    ASA Status:  4                    Consents            Postoperative Care            Comments:                JAKUB QUINONEZ CRNA

## 2022-07-06 NOTE — PLAN OF CARE
"/67   Pulse 65   Temp 97.7  F (36.5  C)   Resp 16   Ht 1.778 m (5' 10\")   Wt 90.9 kg (200 lb 6.4 oz)   SpO2 93%   BMI 28.75 kg/m      Soft bp, levophed initiated and titrated as needed.  Fentanyl and Propofol adequate for sedation and comfort.  Pt tolerates vent with intermittent coughing, titrated meds as needed.  LS diminished.  Symmetrical chest rise observed.  Carolina Menjivar RN.............................7/6/2022 7:28 AM    "

## 2022-07-06 NOTE — PROGRESS NOTES
Patient continues to have high RR and low SaO2 despite increases in sedation. Two hours after  Last upwards titration the patient's the RR BP dropped. Propofol titrated back down, levophed restarted.

## 2022-07-06 NOTE — PHARMACY-CONSULT NOTE
"Pharmacy: New TPN Consult     Antonio Rutherford is  72 year old male admitted on 6/17/2022.    does not have any pertinent problems on file.    Past Medical History:   Diagnosis Date     Alcohol abuse      Tobacco dependence        Most recent flowsheet Weight: 90.9 kg (200 lb 6.4 oz)  Most recent flowsheet Height: 177.8 cm (5' 10\")    Intake/Output Summary (Last 24 hours) at 7/6/2022 1352  Last data filed at 7/6/2022 1300  Gross per 24 hour   Intake 3834.83 ml   Output 945 ml   Net 2889.83 ml       Recent Labs   Lab Test 07/06/22  0509 07/05/22  2227 07/05/22  1205     --  139   POTASSIUM 4.3  --  3.7   CHLORIDE 105  --  101   CO2 21  --  26   BUN 31*  --  26*   CR 1.54*  --  1.30   * 105* 164*   PAWAN 7.9*  --  8.7       Recent Labs   Lab Test 07/05/22  1205 07/03/22  0921   ALKPHOS 61 40   PROTTOTAL 6.5 6.1*   BILITOTAL 0.7 0.6   * 30   * 48     Recent Labs   Lab Test 07/05/22  0616 07/04/22  0541   MAG 2.1 2.2   PHOS 3.1 3.7     Recent Labs   Lab Test 07/05/22  1205 07/05/22  0616 07/03/22  0921 07/02/22  0736 06/30/22  1448 06/28/22  0620   ALBUMIN 3.3*  --  3.2* 3.2* 3.7  --    PREALB  --  12*  --   --  20 20       TPN Type = Central , Continuous  Rate = 80 mL/hr (initiate infusion at  50 mL/hr x 4 hours)      Lipids 20%                             250 mL/day                           500 Kcal/day             Total                1863 Kcal/day (Clinimix PLUS lipids)                          20 Kcal/kg/day (Actual body weight)     Recommendations: Initiate Clinimix E 5/15 with goal rate of 80 mL/hour. Patient does have propofol running around 50 mcg/kg/min which will provide about 720 kcals; however, rate is variable and possibly decreasing.     Will continue to follow.  Thank you for the consult.    Arianna Villanueva [facility] ....................  7/6/2022   1:52 PM  "

## 2022-07-06 NOTE — PROGRESS NOTES
Summary   Patient remained stable throughout the shift no changes made patient sxned settings remained .

## 2022-07-06 NOTE — ANESTHESIA CARE TRANSFER NOTE
Patient: Antonio Rutherford    Procedure: * No procedures listed *       Diagnosis: * No pre-op diagnosis entered *  Diagnosis Additional Information: No value filed.    Anesthesia Type:   No value filed.     Note:      Level of Consciousness: iatrogenic sedation              Patient transferred to: ICU    ICU Handoff: Call for PAUSE to initiate/utilize ICU HANDOFF, Identified Patient, Identified Responsible Provider, Reviewed the Pertinent Medical History, Discussed Surgical Course, Reviewed Intra-OP Anesthesia Management and Issues during Anesthesia, Set Expectations for Post Procedure Period and Allowed Opportunity for Questions and Acknowledgement of Understanding      Vitals:  Vitals Value Taken Time   /64 07/06/22 1837   Temp     Pulse 68 07/06/22 1841   Resp 13 07/06/22 1841   SpO2 98 % 07/06/22 1841   Vitals shown include unvalidated device data.    Electronically Signed By: JAKUB QUINONEZ CRNA  July 6, 2022  6:41 PM

## 2022-07-06 NOTE — PROCEDURES
Preoperative Diagnosis: Sepsis, status postcardiac arrest    Postoperative Diagnosis: Sepsis, status post cardiac arrest    Procedure planned: Central line placment    Procedure performed: Left IJ port CVC with US guidance    Surgeon: Benjamin Luevano MD    Anesthesia: Local    Specimen: none     Estimated Blood Loss: Less than 5 ml     INDICATIONS     Please see the H&P.The patient has history of sepsis, long hospital stay cardiac arrest during the recent days. The risks, benefits and alternatives to implanted port for vasopressor agents with sepsis were discussed with the patient. We specifically discussed the risks of infection, bleeding, injury to blood vessels and lung, blood clot, line malfunction and the possible need for further procedures. The patient expressed understanding and questions were answered. Informed consent paperwork was completed.     DESCRIPTION OF PROCEDURE     The patient was positioned supine in the Trendelenburg position on his hospital bed.  He is left neck and chest were prepped with Chloraprep and draped in the standard fashion. Time out was performed confirming the patient's identity and procedure to be performed.     A Cook needle was used to access the  internal jugular vein under US guidance. There was return of non-pulsatile blood.  The needle was removed and the guide wire was secured.  Skin incision was made. The dilator and sheath were advanced over the wire under flouroscopy. The dilator and wire were removed. The catheter was advanced. The catheter was secured at 17 cm. A post procedure CXR was ordered.     Benjamin Luevano MD on 7/6/2022 at 11:25 AM     CC: Brian Rivero

## 2022-07-06 NOTE — PROGRESS NOTES
Ventilator day 2 for PEA arrest.        Norepinephrine at 0.06.  Fever to 101.6  WBC up to 18.    No cardiac arrhythmias  Ceftriaxone Day 2  No positive cultures   CXR yest clear    Ventilator 16 x 500 5  25%    Recent Labs   Lab 07/06/22  0509 07/05/22  1218   PH 7.36 7.30*   PCO2 42 50*   PO2 69* 414*   HCO3 24 25   O2PER 30 100       LA 1.4  Down from 2.6    +700 net with 500 urinary output     A: PEA arrest, unclear cause although fever and elevated WBC and pressor needs suggest sepsis, but source not clear.  On ceftriaxone.  Low O2 needs so can start PS trials.  Decrease urinary output and Cr was rising yet so may be worse CHARLOTTE--got IVF boluses last night.      P: Await AM BMP  Decrease propofol dose  PS trials  Decrease RR to 12   Continue ceftriaxone although consider broadening for hospital acquired infections such as zosyn ( had had a short course ending 10 days ago) .     Tele ICU available at [phone]     -------------------------------------------------------  8:55 AM  Updated Note  Camera'ed in on patient.  RR 19 but calm.    Cr up a bit to 1.5   P: change OG to NG and start tube feeds as even after patient gets extubated he'll have poor enteral intake.

## 2022-07-06 NOTE — PLAN OF CARE
"BP (!) 78/56   Pulse 100   Temp (!) 100.7  F (38.2  C) (Temporal)   Resp 19   Ht 1.778 m (5' 10\")   Wt 86.2 kg (190 lb 0.6 oz)   SpO2 95%   BMI 27.27 kg/m      Tele-ICU consulted for hypotension.  Titrate medication as able, bolus administered.  Carolina Menjivar RN.............................7/5/2022 9:44 PM    "

## 2022-07-06 NOTE — PROGRESS NOTES
Patient remains on ventilator settings of Vt 500, RR 16, PEEP 5, 25%.  Intubated with a 7.0 ETT secured at 24 at the lip.  Applied pad under upper lip bridge of ETT salazar.  ETT moved Q2 to prevent pressure sores and moisture applied to lips.  Breath sounds clear.  SpO2 in the mid 90s.  No PRN neb treatments given.

## 2022-07-06 NOTE — PROGRESS NOTES
TPN recommendations:     Central TPN formula at 100 ml/hour for 24 hours (2400 ml formula) would provide:   --1704 kcal   --360 g CHO (55% of calories)  --120 g AA (21% of calories)  Lipids at 250 ml/day provides 500 kcal (22% of calories)     **Recommend initiating at 1/2 goal rate for 4 hours, titrate up by 10 ml/hour until goal is reached. Monitor for refeeding syndrome as he has had poor nutritional intakes since admit.     Estimated nutrition needs using current wt: 91 kg (200#):   1557-9075 kcal (25-30 kcal/kg)  109-137 g protein (1.2-1.5 g/kg)  4079-4377 ml fluids (1 ml/kcal)    Leidy Joyce RD on 7/6/2022 at 12:32 PM    Discussed with pharmacy:   Plan to provide 1 bag of formula (~2000 ml of formula)   Central formula at 80 ml/hour for 24 hours would provide:   1920 ml formula, 1363 kcal (96 g AA, 288 g CHO) plus 250 ml lipids daily (500 kcal)  Daily total calories: 1863 kcal, 96 g protein  Leidy Joyce RD on 7/6/2022 at 2:06 PM

## 2022-07-06 NOTE — PHARMACY-VANCOMYCIN DOSING SERVICE
"Pharmacy Vancomycin Initial Note  Date of Service 2022  Patient's  1950  72 year old, male    Indication: Ventilator-Associated Pneumonia    Current estimated CrCl = Estimated Creatinine Clearance: 47.7 mL/min (A) (based on SCr of 1.54 mg/dL (H)).    Creatinine for last 3 days  7/3/2022:  9:21 AM Creatinine 1.16 mg/dL  2022:  6:16 AM Creatinine 1.15 mg/dL; 12:05 PM Creatinine 1.30 mg/dL  2022:  5:09 AM Creatinine 1.54 mg/dL    Recent Vancomycin Level(s) for last 3 days  No results found for requested labs within last 72 hours.      Vancomycin IV Administrations (past 72 hours)      No vancomycin orders with administrations in past 72 hours.                Nephrotoxins and other renal medications (From now, onward)    Start     Dose/Rate Route Frequency Ordered Stop    22 1000  vancomycin (VANCOCIN) 1,250 mg in sodium chloride 0.9 % 250 mL intermittent infusion         1,250 mg  over 60-90 Minutes Intravenous EVERY 24 HOURS 22 0913 22 0959    22 0800  piperacillin-tazobactam (ZOSYN) intermittent infusion 4.5 g        Note to Pharmacy: For [facility], [facility] and [facility]: For Zosyn-naive patients, use the \"Zosyn initial dose + extended infusion\" order panel.    4.5 g  200 mL/hr over 30 Minutes Intravenous EVERY 6 HOURS 22 0755      22 2330  norepinephrine (LEVOPHED) 4 mg in  mL PERIPHERAL infusion         0.03-0.125 mcg/kg/min × 86.2 kg  9.7-40.4 mL/hr  Intravenous CONTINUOUS 22 2318      22 1330  lisinopril (ZESTRIL) tablet 20 mg         20 mg Oral DAILY 22 1320            Contrast Orders - past 72 hours (72h ago, onward)    None          [employer] Prediction of Planned Initial Vancomycin Regimen    Loading dose: N/A  Regimen: 1250 mg IV every 24 hours.  Start time: 1000 on 2022  Exposure target: AUC24 (range)400-600 mg/L.hr   AUC24,ss: 499 mg/L.hr  Probability of AUC24 > 400: 75 %  Ctrough,ss: 15.7 mg/L  Probability of Ctrough,ss > 20: 27 " %  Probability of nephrotoxicity (Lodise EDWARD 2009): 11 %          Plan:  1. Start vancomycin  1250 mg IV q24 for 48 hour rule out.   2. Vancomycin monitoring method: AUC  3. Vancomycin therapeutic monitoring goal: 400-600 mg*h/L  4. Pharmacy will check vancomycin levels as appropriate in 1-3 Days.    5. Serum creatinine levels will be ordered daily for the first week of therapy and at least twice weekly for subsequent weeks.      Arianna Villanueva RPH

## 2022-07-06 NOTE — PROGRESS NOTES
Clinical Nutrition /Reassessment     Assessment:   Client History:  Pt intubated again on 7/5. Had been eating better past few days. NPO now. Will monitor for extubation and diet advancement or enteral feeding if vent is prolonged.    Diet Order: NPO  Oral Intake: ~25% many meals recently. Intakes had improved with assistance.   Supplements: Ensure TID on trays -will add once able to tolerate PO again  Weight:   Vitals:    06/17/22 1613 06/18/22 0243 06/21/22 0409 06/22/22 0600   Weight: 96.2 kg (212 lb) 90.2 kg (198 lb 12.8 oz) 90.7 kg (199 lb 15.3 oz) 95.7 kg (210 lb 15.7 oz)    06/24/22 0348 06/25/22 0418 06/26/22 0557 06/27/22 0400   Weight: 100.5 kg (221 lb 9 oz) 99 kg (218 lb 4.1 oz) 96.8 kg (213 lb 6.5 oz) 95.5 kg (210 lb 8.6 oz)    06/27/22 1111 06/28/22 0515 06/29/22 0200 06/29/22 2346   Weight: 93.3 kg (205 lb 11 oz) 92.4 kg (203 lb 11.3 oz) 91.5 kg (201 lb 11.5 oz) 93.3 kg (205 lb 11 oz)    07/01/22 0100 07/02/22 0108 07/04/22 0523 07/05/22 0644   Weight: 92.5 kg (204 lb) 86.3 kg (190 lb 4.1 oz) 85.3 kg (188 lb 0.8 oz) 86.2 kg (190 lb 0.6 oz)    07/05/22 2330   Weight: 90.9 kg (200 lb 6.4 oz)     Malnutrition Criteria:  (Need to have 2 indicators to qualify recommendation)  Energy Intake:  Acute Severe: </= 50% of estimated energy requirement for >/= 5 days  Interpretation of Weight Loss:  No significant weight loss in past year noted per chart review and lack of information in the time frame  Physical Findings:  No significant findings  Reduced  Strength:  noted weakness, likely reduced with current illness    Recommended Nutrition Diagnosis:   Severe Malnutrition in the context of acute illness or injury - based on AND/ASPEN Clinical Characterstics of Malnutrition May 2012      Nutrition Education: Nutrition education will be provided as appropriate.    Nutrition Diagnosis: Oral or Nutrition Support Intake:  inadequate energy intakes related to refusal to consume oral nutrition/NPO status with  intubation as evidenced by minimal/no intakes x past 6+ days and prior to that, poor intakes    Intervention:  Nutrition Prescription:     Nutrition Intervention(s):Recommended general, healthful diet --IDDSI level 5 foods, level 0 fluids-NPO now that he is intubated  1. Supplements: TID on trays-will add again when appropriate   2. Staff to assist as needed and allowed for meals/snacks     Nutrition Goal(s):  1. Pt will consume 50% or more at meals- not met, on going   2. Pt will not have unplanned wt loss during hospitalization - on going  3. Pt will tolerate diet as ordered- on going, speech working with him for most appropriate textures    Monitoring and Evaluation:   Food Intake, tolerance, weight, diet advancement     Discharge Recommendation:   Nutrition Discharge Planning  Will address closer to discharge     RD will reassess in within 1-5 days or sooner.  Leidy Joyce RD on 7/6/2022 at 8:40 AM

## 2022-07-06 NOTE — PROGRESS NOTES
"TELE-ICU overnight Staff:  Called by bedside nursing re: hypotension.  Reviewed events of day and labs.  Likely septic-mild elevation of LA-given UTI. Unlikely ETOH withdrawal with hypotension.      On high dose propofol for vent dyssynchrony. Suspect hypotension likely combination of relative \"dry\" state and propofol.    Plan:   1. Fluid bolus  ml X 2  2. Start low level fentanyl gtt  3. Wean propofol gtt down as able to ~ 30 ug/kg/min  4. Low dose peripheral norepinephrine gtt  5. [place]kins cx      Kate Heaton MD  #[id number]      "

## 2022-07-06 NOTE — PROGRESS NOTES
[facility]    Medicine Progress Note - Hospitalist Service    Date of Admission:  6/17/2022  Day 19 of hospitalization.       Assessment & Plan    Antonio Rutherford is a 72 year old male admitted on 6/17/2022. He presented from the detention for concerns of possible kidney stone versus sepsis.     Patient was admitted to the detention 7 days PTA.  He is a daily drinker of whiskey and was treated for alcohol withdrawal at the detention and then started on Bactrim for possible UTI 4 days PTA.  He had extremely poor oral intake PTA (20 oz in 7d per detention nurse report).     Patient was admitted treated with thiamine, folate and IV fluids, strict I's and O's and close monitoring.  Developed worsening confusion and somnolence and concern to protect his airway.  Got intubated on 6/21/2022, extubated 6/24/2022. reintubated on 7/5 for code blue vs aspiration event. Remains intubated in ICU today.        Alcohol dependence with withdrawal delirium (H):   Assessment: Resolved.  Sedated and intubated in the ICU on 6/21/22 for somnolence, extubated on 6/24/2022. Ammonia level was normal, ABG did not show significant CO2 retention, head CT and brain MRI neg for acute findings. Repeat CT of the head negative.    Melatonin/Seroquel at night.    -Discontinued CIWA on 6/27  -Minimize sedating medication  -In discussion with social work patient does need to return to detention on discharge.  There is court proceedings determining if he would be allowed to go to skilled nursing facility next week.     Encephalopathy, metabolic.  CT and MRI head/brain negative for acute findings. Likely due to ICU acquired delirium at this time.  Cannot exclude encephalopathy , he should be out of the timeframe for withdrawal.     Plan:   -Delirium order set  -Appreciate PT/OT input for dispo needs  -adding vanco/zosyn to cover for possible sepsis. Follow up repeat blood cultures     Stage 1 pressure ulcer of buttocks with area of unstageable eschar  Heel  pressure ulcer stage 1, Left foot.   Present on admission  -offload   -Air mattress  -Continue Escobedo to minimize skin breakdown  -had rectal tube with diarrhea which was discontinued and diarrhea has resolved     Acute PE, POA: Diagnosed 6/17, likely provoked from immobility and acute illness.  -Initially started on lovenox 1.5mg/kg/d now able to take oral medication  -Continue Xarelto for PE treatment     Aortic valve echodensity: Probable chronic calcific nodule, could also represent small clot, unlikely to represent infective endocarditis process with initial negative blood cultures.  No evidence of CVA given MRI findings and at this point given how critically ill he has been will anticoagulate as above, monitor for clinical improvement and consider repeat ELLEN as an outpatient to ensure stability/resolution.  -Follow-up repeat blood cultures showed no growth     CHARLOTTE due to ATN (acute tubular necrosis) (H): Resolved, creatinine peaked at 2.7, likely from prerenal component from hypovolemia and infection and possible ATN from Bactrim/ibuprofen prior to admission. Had decompensated renal function today, noted.     Plan:  -monitor daily BMP  -monitor zosyn/renal function  -avoid NSAIDs and nephrotoxins     Community-acquired versus aspiration pneumonia: Possibly contributing to need for intubation with possible component of aspiration given alcohol abuse  -Completed 7-day course of Zosyn, restarted on 7/6   -per discussion with family patient has required dilation of the esophagus in the past, cannot exclude achalasia as contributing to his symptoms. He is not currently a candidate for endoscopy but this can be discussed when he is more stable.      UTI. POA: Resolved.  Empirically treated with Bactrim, Urine culture negative.  No growth in initial urine culture. Blood cultures negative.   Now with low-grade fever and UA suggestive of possible bacterial infection.  Rocephin 1 g daily started, switched to zosyn. Escobedo  placed on admission. Possible CAUTI.      Essential hypertension: Stable.  -Continue as needed hydralazine and-labetalol  -Continue home ACE  -Continue metoprolol 25 mg twice daily     Severe protein calorie malnutrition: Generally poor oral intake.    -Inpatient dietary eval  -TPN started today.   -will need central line, spoke with surgery. He has been on blood thinners for PE.       Sinus tachycardia: Resolved, likely from withdrawal.  Got amiodarone x24 hours. Now HR regular and fairly well controlled, moderate elevation especially with agitation.     diarrhea: Resolved  -C. difficile and stool culture negative.    -Imodium as needed     Smoker.  completed course of steroids.      Hypokalemia  Likely from poor oral intake.  Improved.  Replace per protocol-     Malnutrition, down 20 pounds since admission.  Prealbumin has now fallen down to 12.   Patient needs assistance with eating at this time.  Continue with high-calorie supplements.  Folate and B12 were normal. Nutrition consult, start TPN. Hopefully will be extubated today and able to start taking better PO nutrition and hydration.      Hypotension likely due to propofol sedation.  hold antihypertensives as needed. Currently on pressors which he required with sedation and intubation last week.   -Was on lisinopril prior to admission.  Will resume when blood pressure is normalized.  -Metoprolol started this admission.  Will restart when appropriate.  -As needed hydralazine and labetalol ordered    Encephalopathy, metabolic. CT and MRI head/brain negative for acute findings.   Assessment:  Likely due to hospital-acquired, ICU acquired delirium at this time.  Cannot exclude encephalopathy from acute infection which should all be improving at this time.  He should be out of the timeframe for withdrawal.  Suspect more likely delirium at this time.  Plan: Continue current treatment plan.  Minimize tethers.  Delirium order set.  Will need placement, unsure if he is  a candidate. Came from detention. Will need to discuss with social work.     Prognosis: guarded. Many comorbidities, reintubation, malnutrition, delirium.        Diet: Minced & Moist Diet (level 5) Thin Liquids (level 0)    DVT Prophylaxis: DOAC  Escobedo Catheter: PRESENT, indication: Deep Sedation/Paralysis;Wound Healing, Strict 1-2 Hour I&O  Central Lines: None  Cardiac Monitoring: None  Code Status: Full Code      Total critical care time: 60min    Disposition Plan       Unclear, at least several days. Plan was to return to detention when stable. May need ARU. Working with SW and family.     The patient's care was discussed with the Patient's Family.    Erna Shelton DO  Hospitalist Service  [facility]  Securely message with the Vocera Web Console (learn more here)  Text page via [employer] Paging/Directory         Clinically Significant Risk Factors Present on Admission                      ______________________________________________________________________    Interval History   Had a CODE BLUE yesterday.  Unclear if he was truly pulseless or if it was more of a aspiration apneic event.  Got reintubated.  Discussion of possible extubation today, however, after discussion with telemetry ICU we will continue to have him intubated.  Many problems as noted above significant acute issues at this time now include his intubation, aspiration, poor nutrition and poor p.o. intake.  We will start TPN today.  We will need a central line, discussed with surgery.  Ongoing need for pressors while on sedation.  Overall prognosis is guarded.  Will be many days in the hospital and whether or not he will be able to go to ARU or other skilled is questionable as he came from detention.  He will likely have prolonged recovery time and may not return to his previous baseline mental status.    Data reviewed today: I reviewed all medications, new labs and imaging results over the last 24 hours. I personally reviewed no images or  EKG's today.    Physical Exam   Vital Signs: Temp: 97.7  F (36.5  C) Temp src: Tympanic BP: (!) 143/72 Pulse: 73   Resp: 17 SpO2: 90 % O2 Device: Mechanical Ventilator    Weight: 200 lbs 6.37 oz  General Appearance: Intubated and sedated.  We will block the tube at times as we are weaning sedation.  Appears much more weak, deconditioned and with some significant weight loss since admission.  Respiratory: Clear anteriorly.  Intubated.  Cardiovascular: Regular.  Poor distal pulses but palpable  GI: Abdomen soft, nontender, nondistended  Skin: Ulceration of buttocks and heel otherwise warm and dry, age-related skin changes no rashes  Other:      Data   Recent Labs   Lab 07/06/22  0509 07/05/22  2227 07/05/22  1205 07/05/22  0738 07/05/22  0616 07/03/22  1114 07/03/22  0921   WBC 18.0*  --  12.7*  --  6.6   < > 7.1   HGB 12.0*  --  12.9*  --  13.0*   < > 13.7   *  --  104*  --  102*   < > 110*     --  264  --  194   < > 225     --  139  --  142  --  135   POTASSIUM 4.3  --  3.7  --  3.8   < > 3.5   CHLORIDE 105  --  101  --  105  --  103   CO2 21  --  26  --  28  --  21   BUN 31*  --  26*  --  26*  --  27*   CR 1.54*  --  1.30  --  1.15  --  1.16   ANIONGAP 13  --  12  --  9  --  11   PAWAN 7.9*  --  8.7  --  8.7  --  8.5*   * 105* 164*   < > 83   < > 85   ALBUMIN  --   --  3.3*  --   --   --  3.2*   PROTTOTAL  --   --  6.5  --   --   --  6.1*   BILITOTAL  --   --  0.7  --   --   --  0.6   ALKPHOS  --   --  61  --   --   --  40   ALT  --   --  115*  --   --   --  48   AST  --   --  155*  --   --   --  30    < > = values in this interval not displayed.     Recent Results (from the past 24 hour(s))   XR Chest Port 1 View    Narrative    PROCEDURE:  XR CHEST PORT 1 VIEW    HISTORY: post intubation. .    COMPARISON:  None.    FINDINGS:    The cardiomediastinal contours are stable.  No focal consolidation, effusion or pneumothorax.      Impression    IMPRESSION:   1. No acute cardiopulmonary disease  seen.   2. Endotracheal tube in good position.     JEROD POWELL MD         SYSTEM ID:  [id number]

## 2022-07-07 ENCOUNTER — RESULTS ONLY (OUTPATIENT)
Dept: PULMONOLOGY | Facility: CLINIC | Age: 72
End: 2022-07-07

## 2022-07-07 ENCOUNTER — APPOINTMENT (OUTPATIENT)
Dept: GENERAL RADIOLOGY | Facility: OTHER | Age: 72
DRG: 673 | End: 2022-07-07
Attending: FAMILY MEDICINE
Payer: MEDICARE

## 2022-07-07 ENCOUNTER — APPOINTMENT (OUTPATIENT)
Dept: CARDIOLOGY | Facility: OTHER | Age: 72
DRG: 673 | End: 2022-07-07
Attending: FAMILY MEDICINE
Payer: MEDICARE

## 2022-07-07 PROBLEM — N17.9 ACUTE KIDNEY FAILURE, UNSPECIFIED (H): Status: ACTIVE | Noted: 2022-07-07

## 2022-07-07 LAB
ALBUMIN SERPL-MCNC: 2.5 G/DL (ref 3.5–5.7)
ALP SERPL-CCNC: 41 U/L (ref 34–104)
ALT SERPL W P-5'-P-CCNC: 44 U/L (ref 7–52)
ANION GAP SERPL CALCULATED.3IONS-SCNC: 7 MMOL/L (ref 3–14)
AST SERPL W P-5'-P-CCNC: 28 U/L (ref 13–39)
ATRIAL RATE - MUSE: 71 BPM
BACTERIA UR CULT: NO GROWTH
BASE EXCESS BLDA CALC-SCNC: -5.3 MMOL/L (ref -9–1.8)
BASE EXCESS BLDA CALC-SCNC: -5.4 MMOL/L (ref -9–1.8)
BASE EXCESS BLDA CALC-SCNC: -6 MMOL/L (ref -9–1.8)
BASE EXCESS BLDA CALC-SCNC: -6.9 MMOL/L (ref -9–1.8)
BILIRUB DIRECT SERPL-MCNC: 0.3 MG/DL (ref 0–0.2)
BILIRUB SERPL-MCNC: 0.6 MG/DL (ref 0.3–1)
BUN SERPL-MCNC: 32 MG/DL (ref 7–25)
CALCIUM SERPL-MCNC: 8 MG/DL (ref 8.6–10.3)
CHLORIDE BLD-SCNC: 106 MMOL/L (ref 98–107)
CO2 SERPL-SCNC: 23 MMOL/L (ref 21–31)
CREAT SERPL-MCNC: 1.61 MG/DL (ref 0.7–1.3)
DIASTOLIC BLOOD PRESSURE - MUSE: NORMAL MMHG
ERYTHROCYTE [DISTWIDTH] IN BLOOD BY AUTOMATED COUNT: 14.1 % (ref 10–15)
GFR SERPL CREATININE-BSD FRML MDRD: 45 ML/MIN/1.73M2
GLUCOSE BLD-MCNC: 263 MG/DL (ref 70–105)
GLUCOSE BLDC GLUCOMTR-MCNC: 310 MG/DL (ref 70–99)
GLUCOSE BLDC GLUCOMTR-MCNC: 315 MG/DL (ref 70–99)
GLUCOSE BLDC GLUCOMTR-MCNC: 325 MG/DL (ref 70–99)
GLUCOSE BLDC GLUCOMTR-MCNC: 343 MG/DL (ref 70–99)
GLUCOSE BLDC GLUCOMTR-MCNC: 346 MG/DL (ref 70–99)
GLUCOSE BLDC GLUCOMTR-MCNC: 354 MG/DL (ref 70–99)
GLUCOSE BLDC GLUCOMTR-MCNC: 361 MG/DL (ref 70–99)
GLUCOSE BLDC GLUCOMTR-MCNC: 364 MG/DL (ref 70–99)
HCO3 BLD-SCNC: 20 MMOL/L (ref 21–28)
HCO3 BLD-SCNC: 21 MMOL/L (ref 21–28)
HCO3 BLD-SCNC: 22 MMOL/L (ref 21–28)
HCO3 BLD-SCNC: 22 MMOL/L (ref 21–28)
HCT VFR BLD AUTO: 32.1 % (ref 40–53)
HGB BLD-MCNC: 10.4 G/DL (ref 13.3–17.7)
INR PPP: 1.24 (ref 0.85–1.15)
INTERPRETATION ECG - MUSE: NORMAL
LACTATE SERPL-SCNC: 2.1 MMOL/L (ref 0.7–2)
LVEF ECHO: NORMAL
MAGNESIUM SERPL-MCNC: 1.9 MG/DL (ref 1.9–2.7)
MCH RBC QN AUTO: 34.7 PG (ref 26.5–33)
MCHC RBC AUTO-ENTMCNC: 32.4 G/DL (ref 31.5–36.5)
MCV RBC AUTO: 107 FL (ref 78–100)
O2/TOTAL GAS SETTING VFR VENT: 30 %
O2/TOTAL GAS SETTING VFR VENT: 4 %
O2/TOTAL GAS SETTING VFR VENT: 4 %
O2/TOTAL GAS SETTING VFR VENT: 45 %
OXYHGB MFR BLD: 92 % (ref 92–100)
OXYHGB MFR BLD: 93 % (ref 92–100)
P AXIS - MUSE: 62 DEGREES
PCO2 BLD: 46 MM HG (ref 35–45)
PCO2 BLD: 49 MM HG (ref 35–45)
PCO2 BLD: 50 MM HG (ref 35–45)
PCO2 BLD: 52 MM HG (ref 35–45)
PH BLD: 7.24 [PH] (ref 7.35–7.45)
PH BLD: 7.25 [PH] (ref 7.35–7.45)
PHOSPHATE SERPL-MCNC: 3.3 MG/DL (ref 2.5–5)
PLATELET # BLD AUTO: 171 10E3/UL (ref 150–450)
PO2 BLD: 71 MM HG (ref 80–105)
PO2 BLD: 76 MM HG (ref 80–105)
PO2 BLD: 77 MM HG (ref 80–105)
PO2 BLD: 80 MM HG (ref 80–105)
POTASSIUM BLD-SCNC: 4.3 MMOL/L (ref 3.5–5.1)
PR INTERVAL - MUSE: 194 MS
PREALB SERPL IA-MCNC: 6 MG/DL (ref 15–45)
PROT SERPL-MCNC: 5.3 G/DL (ref 6.4–8.9)
QRS DURATION - MUSE: 98 MS
QT - MUSE: 424 MS
QTC - MUSE: 460 MS
R AXIS - MUSE: 65 DEGREES
RBC # BLD AUTO: 3 10E6/UL (ref 4.4–5.9)
SODIUM SERPL-SCNC: 136 MMOL/L (ref 134–144)
SYSTOLIC BLOOD PRESSURE - MUSE: NORMAL MMHG
T AXIS - MUSE: 63 DEGREES
TROPONIN I SERPL-MCNC: 1833.4 PG/ML (ref 0–34)
TROPONIN I SERPL-MCNC: 2000.1 PG/ML (ref 0–34)
TROPONIN I SERPL-MCNC: 327.3 PG/ML (ref 0–34)
VENTRICULAR RATE- MUSE: 71 BPM
WBC # BLD AUTO: 16 10E3/UL (ref 4–11)

## 2022-07-07 PROCEDURE — 250N000011 HC RX IP 250 OP 636: Performed by: INTERNAL MEDICINE

## 2022-07-07 PROCEDURE — 93321 DOPPLER ECHO F-UP/LMTD STD: CPT | Mod: 26 | Performed by: INTERNAL MEDICINE

## 2022-07-07 PROCEDURE — 36415 COLL VENOUS BLD VENIPUNCTURE: CPT | Performed by: FAMILY MEDICINE

## 2022-07-07 PROCEDURE — 200N000001 HC R&B ICU

## 2022-07-07 PROCEDURE — 82803 BLOOD GASES ANY COMBINATION: CPT | Performed by: INTERNAL MEDICINE

## 2022-07-07 PROCEDURE — 93010 ELECTROCARDIOGRAM REPORT: CPT | Performed by: INTERNAL MEDICINE

## 2022-07-07 PROCEDURE — 93005 ELECTROCARDIOGRAM TRACING: CPT

## 2022-07-07 PROCEDURE — 82805 BLOOD GASES W/O2 SATURATION: CPT | Performed by: FAMILY MEDICINE

## 2022-07-07 PROCEDURE — 250N000009 HC RX 250: Performed by: INTERNAL MEDICINE

## 2022-07-07 PROCEDURE — 250N000013 HC RX MED GY IP 250 OP 250 PS 637: Performed by: FAMILY MEDICINE

## 2022-07-07 PROCEDURE — 93325 DOPPLER ECHO COLOR FLOW MAPG: CPT | Mod: 26 | Performed by: INTERNAL MEDICINE

## 2022-07-07 PROCEDURE — 36600 WITHDRAWAL OF ARTERIAL BLOOD: CPT | Performed by: FAMILY MEDICINE

## 2022-07-07 PROCEDURE — 94640 AIRWAY INHALATION TREATMENT: CPT | Mod: 76

## 2022-07-07 PROCEDURE — 250N000009 HC RX 250: Performed by: FAMILY MEDICINE

## 2022-07-07 PROCEDURE — 36592 COLLECT BLOOD FROM PICC: CPT | Performed by: FAMILY MEDICINE

## 2022-07-07 PROCEDURE — 258N000003 HC RX IP 258 OP 636: Performed by: FAMILY MEDICINE

## 2022-07-07 PROCEDURE — 36600 WITHDRAWAL OF ARTERIAL BLOOD: CPT | Performed by: INTERNAL MEDICINE

## 2022-07-07 PROCEDURE — 85027 COMPLETE CBC AUTOMATED: CPT | Performed by: FAMILY MEDICINE

## 2022-07-07 PROCEDURE — 87205 SMEAR GRAM STAIN: CPT | Performed by: INTERNAL MEDICINE

## 2022-07-07 PROCEDURE — 94640 AIRWAY INHALATION TREATMENT: CPT

## 2022-07-07 PROCEDURE — 250N000013 HC RX MED GY IP 250 OP 250 PS 637: Performed by: INTERNAL MEDICINE

## 2022-07-07 PROCEDURE — 83735 ASSAY OF MAGNESIUM: CPT | Performed by: FAMILY MEDICINE

## 2022-07-07 PROCEDURE — 84100 ASSAY OF PHOSPHORUS: CPT | Performed by: FAMILY MEDICINE

## 2022-07-07 PROCEDURE — 999N000157 HC STATISTIC RCP TIME EA 10 MIN

## 2022-07-07 PROCEDURE — 250N000012 HC RX MED GY IP 250 OP 636 PS 637: Performed by: FAMILY MEDICINE

## 2022-07-07 PROCEDURE — 93308 TTE F-UP OR LMTD: CPT | Mod: 26 | Performed by: INTERNAL MEDICINE

## 2022-07-07 PROCEDURE — 94003 VENT MGMT INPAT SUBQ DAY: CPT

## 2022-07-07 PROCEDURE — C9113 INJ PANTOPRAZOLE SODIUM, VIA: HCPCS | Performed by: INTERNAL MEDICINE

## 2022-07-07 PROCEDURE — 250N000011 HC RX IP 250 OP 636: Performed by: FAMILY MEDICINE

## 2022-07-07 PROCEDURE — 80053 COMPREHEN METABOLIC PANEL: CPT | Performed by: FAMILY MEDICINE

## 2022-07-07 PROCEDURE — 84484 ASSAY OF TROPONIN QUANT: CPT | Performed by: FAMILY MEDICINE

## 2022-07-07 PROCEDURE — 84484 ASSAY OF TROPONIN QUANT: CPT | Performed by: INTERNAL MEDICINE

## 2022-07-07 PROCEDURE — 99291 CRITICAL CARE FIRST HOUR: CPT | Performed by: FAMILY MEDICINE

## 2022-07-07 PROCEDURE — 82248 BILIRUBIN DIRECT: CPT | Performed by: FAMILY MEDICINE

## 2022-07-07 PROCEDURE — 85610 PROTHROMBIN TIME: CPT | Performed by: FAMILY MEDICINE

## 2022-07-07 PROCEDURE — 93325 DOPPLER ECHO COLOR FLOW MAPG: CPT

## 2022-07-07 PROCEDURE — 83605 ASSAY OF LACTIC ACID: CPT | Performed by: FAMILY MEDICINE

## 2022-07-07 PROCEDURE — 71045 X-RAY EXAM CHEST 1 VIEW: CPT

## 2022-07-07 PROCEDURE — 84134 ASSAY OF PREALBUMIN: CPT | Performed by: FAMILY MEDICINE

## 2022-07-07 PROCEDURE — 82040 ASSAY OF SERUM ALBUMIN: CPT | Performed by: FAMILY MEDICINE

## 2022-07-07 RX ORDER — DEXTROSE MONOHYDRATE 100 MG/ML
INJECTION, SOLUTION INTRAVENOUS CONTINUOUS PRN
Status: DISCONTINUED | OUTPATIENT
Start: 2022-07-07 | End: 2022-07-12 | Stop reason: HOSPADM

## 2022-07-07 RX ORDER — METHYLPREDNISOLONE SODIUM SUCCINATE 40 MG/ML
40 INJECTION, POWDER, LYOPHILIZED, FOR SOLUTION INTRAMUSCULAR; INTRAVENOUS EVERY 12 HOURS
Status: COMPLETED | OUTPATIENT
Start: 2022-07-08 | End: 2022-07-09

## 2022-07-07 RX ORDER — HEPARIN SODIUM 10000 [USP'U]/100ML
0-5000 INJECTION, SOLUTION INTRAVENOUS CONTINUOUS
Status: DISCONTINUED | OUTPATIENT
Start: 2022-07-07 | End: 2022-07-12 | Stop reason: HOSPADM

## 2022-07-07 RX ORDER — MAGNESIUM SULFATE HEPTAHYDRATE 40 MG/ML
2 INJECTION, SOLUTION INTRAVENOUS ONCE
Status: COMPLETED | OUTPATIENT
Start: 2022-07-07 | End: 2022-07-07

## 2022-07-07 RX ORDER — METHYLPREDNISOLONE SODIUM SUCCINATE 40 MG/ML
40 INJECTION, POWDER, LYOPHILIZED, FOR SOLUTION INTRAMUSCULAR; INTRAVENOUS EVERY 24 HOURS
Status: COMPLETED | OUTPATIENT
Start: 2022-07-10 | End: 2022-07-11

## 2022-07-07 RX ADMIN — MAGNESIUM SULFATE HEPTAHYDRATE 2 G: 40 INJECTION, SOLUTION INTRAVENOUS at 09:06

## 2022-07-07 RX ADMIN — Medication 1 TABLET: at 09:24

## 2022-07-07 RX ADMIN — ASCORBIC ACID, VITAMIN A PALMITATE, CHOLECALCIFEROL, THIAMINE HYDROCHLORIDE, RIBOFLAVIN-5 PHOSPHATE SODIUM, PYRIDOXINE HYDROCHLORIDE, NIACINAMIDE, DEXPANTHENOL, ALPHA-TOCOPHEROL ACETATE, VITAMIN K1, FOLIC ACID, BIOTIN, CYANOCOBALAMIN: 200; 3300; 200; 6; 3.6; 6; 40; 15; 10; 150; 600; 60; 5 INJECTION, SOLUTION INTRAVENOUS at 00:00

## 2022-07-07 RX ADMIN — PROPOFOL 55 MCG/KG/MIN: 10 INJECTION, EMULSION INTRAVENOUS at 06:41

## 2022-07-07 RX ADMIN — I.V. FAT EMULSION 250 ML: 20 EMULSION INTRAVENOUS at 00:00

## 2022-07-07 RX ADMIN — Medication 100 MCG: at 02:30

## 2022-07-07 RX ADMIN — ASCORBIC ACID, VITAMIN A PALMITATE, CHOLECALCIFEROL, THIAMINE HYDROCHLORIDE, RIBOFLAVIN-5 PHOSPHATE SODIUM, PYRIDOXINE HYDROCHLORIDE, NIACINAMIDE, DEXPANTHENOL, ALPHA-TOCOPHEROL ACETATE, VITAMIN K1, FOLIC ACID, BIOTIN, CYANOCOBALAMIN: 200; 3300; 200; 6; 3.6; 6; 40; 15; 10; 150; 600; 60; 5 INJECTION, SOLUTION INTRAVENOUS at 21:30

## 2022-07-07 RX ADMIN — FOLIC ACID 1 MG: 1 TABLET ORAL at 09:24

## 2022-07-07 RX ADMIN — METHYLPREDNISOLONE 40 MG: 40 INJECTION, POWDER, LYOPHILIZED, FOR SOLUTION INTRAMUSCULAR; INTRAVENOUS at 05:55

## 2022-07-07 RX ADMIN — IPRATROPIUM BROMIDE AND ALBUTEROL SULFATE 3 ML: 2.5; .5 SOLUTION RESPIRATORY (INHALATION) at 12:56

## 2022-07-07 RX ADMIN — PROPOFOL 25 MCG/KG/MIN: 10 INJECTION, EMULSION INTRAVENOUS at 22:11

## 2022-07-07 RX ADMIN — Medication 4.5 G: at 03:41

## 2022-07-07 RX ADMIN — SODIUM CHLORIDE: 9 INJECTION, SOLUTION INTRAVENOUS at 06:00

## 2022-07-07 RX ADMIN — RIVAROXABAN 15 MG: 15 TABLET, FILM COATED ORAL at 07:44

## 2022-07-07 RX ADMIN — IPRATROPIUM BROMIDE AND ALBUTEROL SULFATE 3 ML: 2.5; .5 SOLUTION RESPIRATORY (INHALATION) at 06:09

## 2022-07-07 RX ADMIN — NYSTATIN: 100000 POWDER TOPICAL at 09:25

## 2022-07-07 RX ADMIN — Medication 100 MCG: at 05:35

## 2022-07-07 RX ADMIN — PROPOFOL 25 MCG/KG/MIN: 10 INJECTION, EMULSION INTRAVENOUS at 14:14

## 2022-07-07 RX ADMIN — PROPOFOL 50 MCG/KG/MIN: 10 INJECTION, EMULSION INTRAVENOUS at 10:34

## 2022-07-07 RX ADMIN — THIAMINE HCL TAB 100 MG 300 MG: 100 TAB at 09:24

## 2022-07-07 RX ADMIN — PANTOPRAZOLE SODIUM 40 MG: 40 INJECTION, POWDER, FOR SOLUTION INTRAVENOUS at 09:24

## 2022-07-07 RX ADMIN — PROPOFOL 70 MCG/KG/MIN: 10 INJECTION, EMULSION INTRAVENOUS at 01:02

## 2022-07-07 RX ADMIN — Medication 4.5 G: at 16:28

## 2022-07-07 RX ADMIN — Medication 0.05 MCG/KG/MIN: at 05:48

## 2022-07-07 RX ADMIN — Medication 100 MCG: at 00:28

## 2022-07-07 RX ADMIN — METHYLPREDNISOLONE 40 MG: 40 INJECTION, POWDER, LYOPHILIZED, FOR SOLUTION INTRAMUSCULAR; INTRAVENOUS at 14:16

## 2022-07-07 RX ADMIN — INSULIN HUMAN 4 UNITS/HR: 1 INJECTION, SOLUTION INTRAVENOUS at 21:16

## 2022-07-07 RX ADMIN — Medication 4.5 G: at 23:27

## 2022-07-07 RX ADMIN — INSULIN ASPART 5 UNITS: 100 INJECTION, SOLUTION INTRAVENOUS; SUBCUTANEOUS at 10:59

## 2022-07-07 RX ADMIN — Medication 100 MCG/HR: at 05:41

## 2022-07-07 RX ADMIN — IPRATROPIUM BROMIDE AND ALBUTEROL SULFATE 3 ML: 2.5; .5 SOLUTION RESPIRATORY (INHALATION) at 18:22

## 2022-07-07 RX ADMIN — IPRATROPIUM BROMIDE AND ALBUTEROL SULFATE 3 ML: 2.5; .5 SOLUTION RESPIRATORY (INHALATION) at 15:02

## 2022-07-07 RX ADMIN — PROPOFOL 60 MCG/KG/MIN: 10 INJECTION, EMULSION INTRAVENOUS at 03:55

## 2022-07-07 RX ADMIN — VANCOMYCIN HYDROCHLORIDE 1250 MG: 10 INJECTION, POWDER, LYOPHILIZED, FOR SOLUTION INTRAVENOUS at 11:12

## 2022-07-07 RX ADMIN — I.V. FAT EMULSION 250 ML: 20 EMULSION INTRAVENOUS at 21:30

## 2022-07-07 RX ADMIN — Medication 4.5 G: at 07:41

## 2022-07-07 RX ADMIN — HEPARIN SODIUM 1700 UNITS/HR: 10000 INJECTION, SOLUTION INTRAVENOUS at 20:53

## 2022-07-07 RX ADMIN — SODIUM CHLORIDE: 9 INJECTION, SOLUTION INTRAVENOUS at 23:28

## 2022-07-07 ASSESSMENT — ACTIVITIES OF DAILY LIVING (ADL)
ADLS_ACUITY_SCORE: 53
ADLS_ACUITY_SCORE: 50
ADLS_ACUITY_SCORE: 48
ADLS_ACUITY_SCORE: 49
ADLS_ACUITY_SCORE: 48
ADLS_ACUITY_SCORE: 50

## 2022-07-07 NOTE — PHARMACY-CONSULT NOTE
"Pharmacy: New TPN Consult     Antonio Rutherford is  72 year old male admitted on 6/17/2022.    does not have any pertinent problems on file.    Past Medical History:   Diagnosis Date     Alcohol abuse      Tobacco dependence        Most recent flowsheet Weight: 94.4 kg (208 lb 1.8 oz)  Most recent flowsheet Height: 177.8 cm (5' 10\")    Intake/Output Summary (Last 24 hours) at 7/7/2022 1257  Last data filed at 7/7/2022 1150  Gross per 24 hour   Intake 4025.46 ml   Output 1640 ml   Net 2385.46 ml       Recent Labs   Lab Test 07/07/22  1222 07/07/22  1047 07/07/22  0531 07/06/22  1613 07/06/22  0509   NA  --   --  136  --  139   POTASSIUM  --   --  4.3  --  4.3   CHLORIDE  --   --  106  --  105   CO2  --   --  23  --  21   BUN  --   --  32*  --  31*   CR  --   --  1.61*  --  1.54*   * 354* 263*   < > 107*   PAWAN  --   --  8.0*  --  7.9*    < > = values in this interval not displayed.       Recent Labs   Lab Test 07/07/22  0531 07/05/22  1205   ALKPHOS 41 61   PROTTOTAL 5.3* 6.5   BILITOTAL 0.6 0.7   AST 28 155*   ALT 44 115*     Recent Labs   Lab Test 07/07/22  0531 07/05/22  0616   MAG 1.9 2.1   PHOS 3.3 3.1     Recent Labs   Lab Test 07/07/22  0531 07/05/22  1205 07/05/22  0616 07/03/22  0921 07/02/22  0736 06/30/22  1448 06/28/22  0620   ALBUMIN 2.5* 3.3*  --  3.2* 3.2* 3.7  --    PREALB 6*  --  12*  --   --  20 20       TPN Type = Central , Continuous  Rate = 80 mL/hr (initiate infusion at  50 mL/hr x 4 hours)         Recommendations: Continue with current TPN at 80 mL/min.     Will continue to follow.  Thank you for the consult.    Arianna Villanueva [facility] ....................  7/7/2022   12:57 PM  "

## 2022-07-07 NOTE — PROGRESS NOTES
Currently 95% on vent, tolerating vent well at this time.  RR increased to 16 this morning after ABG results, Tele-ICU notified.  Current vent settings are Vt 500, RR 16, PEEP 5, 35%.  7.0 ETT in place remains at 24 at the lip.  ETT moved Q2 to prevent pressure sores.  Breath sounds clear throughout.  Suctioned small amount of yellow secretions throughout shift.  Scheduled nebs given as ordered, 1 PRN neb treatment given this shift.

## 2022-07-07 NOTE — SIGNIFICANT EVENT
Significant Event Note    Time of event: 5:30 PM July 7, 2022    Description of event:  Markedly elevated troponin, at 1833.    Plan:  Echo done earlier, results pending. No new changes on EKG. Consider new PE/clot despite xarelto. Doubt new cardiac event.  To be safe will start heparin infusion, hold xarelto. Leg doppler studies ordered, not able to CT with contrast. Discussed with Tele-hub    Discussed with: Tele-hub    Torin Eldridge MD

## 2022-07-07 NOTE — PROGRESS NOTES
TELE ICU Brief Note    72  M a/w PEA arrest with resultant shock, resp failure, D3 on vent   Overnight - episodes of self resolving v tach as well vent dysssychrony with vent adjustments made, steroid course initiated. Labs reviewed - Cr up 1.6 , K 4.3, Mg 1.9.  PCO2 up with vent adjusted . Remains on pressors (as well lisinopril)        Rec    Repeat ABG - follow   Mag supplementation goal >2.0 (ordered)  discontinue lisinopril  Attempt lighten sedation - clarify rass goal  Consider diuresis to maintain even status  Start PPI given steroids and pressors      ICU Daily Rounding Checklist     Checklist Response Notes   Can sedation be reduced?  Yes Goal 0 to -1 in orders but clinically - 4 for vent synchrony   Can analgesia be reduced? Yes Fentanyl 100 - consider trial weaning gtt    Is delirium being assessed, addressed and prevented? Yes Delirium order set per MD    Spontaneous awakening trial and/or Spontaneous breathing trial candidate?  Pending On 35% and 5 PEEP   Total fluid balance goal reviewed?  Yes Target Goals:    Even to net negative, [24h] (currently +6 L)   Is the patient at goals for lung protective ventilation? Yes IBW 73 kg - 8 ml/kg ~500 ml Pplat <30   Head of bed elevation (30 degrees)? Yes    Skin breakdown assessment (prevention) completed? Yes Noted wound on toe   Is enteral nutrition at goal? NA On parenteral    Is blood glucose at goal? No Recent glucose 263 -    Deep venous thrombosis prophylaxis? Yes Apixiban     Gastric ulcer prophylaxis?  Yes, will step up to PPI given steroids and pressors If coagulopathy (INR-1.5 PTT2x normal. Ph<50k), mechanical ventilation 48hr, history of GI bleed/ulcer within past year. TBL, SCI, or burn, or if >= 2 minor risk factors (sepsis, ICU stay 1 week, occult GI bleed > 6 days. glucocorticoid therapy, NSAID use, antiplatelet use)   Can Antibiotics be narrowed or discontinued? No Continue empiric abx for 48hr    Early mobility candidate and physical therapy  consulted? No    Is archer catheter needed? Yes    Is central venous/arterial catheter needed? Yes On pressors    Has the family been updated? Per primary team    Are the patient's goals of care and code status current? Per primary team        Alan Ingram MD

## 2022-07-07 NOTE — PLAN OF CARE
Goal Outcome Evaluation:       Discharge Planner PT   Patient remains unable to successfully participate and/or tolerate PT/OT. PT/OT will remain available to assist nursing staff with patient mobility and ADL performance as needed.       Entered by: Aftab Short PT 07/07/2022 1:37 PM

## 2022-07-07 NOTE — PLAN OF CARE
Problem: Plan of Care - These are the overarching goals to be used throughout the patient stay.    Goal: Readiness for Transition of Care  Outcome: Ongoing, Not Progressing     Problem: Oral Intake Inadequate  Goal: Optimal Oral Intake  Outcome: Ongoing, Not Progressing     Problem: Plan of Care - These are the overarching goals to be used throughout the patient stay.    Goal: Absence of Hospital-Acquired Illness or Injury  Outcome: Ongoing, Progressing  Intervention: Identify and Manage Fall Risk    Intervention: Prevent Skin Injury  Recent Flowsheet Documentation  Taken 7/7/2022 1600 by Licha Galvez RN  Body Position: turned  Taken 7/7/2022 1200 by Licha Galvez RN  Body Position: turned  Taken 7/7/2022 1000 by Licha Galvez RN  Body Position: turned  Taken 7/7/2022 0800 by Licha Galvez RN  Body Position: turned  Intervention: Prevent and Manage VTE (Venous Thromboembolism) Risk  Recent Flowsheet Documentation  Taken 7/7/2022 1600 by Licha Galvez RN  Activity Management: bedrest  Taken 7/7/2022 1200 by Licha Galvez RN  Activity Management: bedrest  Taken 7/7/2022 0800 by Licha Galvez RN  Activity Management: bedrest  Intervention: Prevent Infection  Recent Flowsheet Documentation  Taken 7/7/2022 1600 by Licha Galvez RN  Infection Prevention: hand hygiene promoted  Taken 7/7/2022 1200 by Licha Galvez RN  Infection Prevention: hand hygiene promoted  Taken 7/7/2022 0800 by Licha Galvez RN  Infection Prevention: hand hygiene promoted  Goal: Optimal Comfort and Wellbeing  Outcome: Ongoing, Progressing     Problem: Fall Injury Risk  Goal: Absence of Fall and Fall-Related Injury  Outcome: Ongoing, Progressing  Intervention: Identify and Manage Contributors  Recent Flowsheet Documentation  Taken 7/7/2022 1600 by Licha Galvez RN  Medication Review/Management:   infusion titrated   high-risk medications  identified  Taken 7/7/2022 1200 by Licha Galvez RN  Medication Review/Management:   infusion titrated   high-risk medications identified  Taken 7/7/2022 0800 by Licha Galvez RN  Medication Review/Management:   infusion titrated   high-risk medications identified  Intervention: Promote Injury-Free Environment  Recent Flowsheet Documentation  Taken 7/7/2022 1600 by Licha Galvez RN  Safety Promotion/Fall Prevention: safety round/check completed  Taken 7/7/2022 1200 by Licha Galvez RN  Safety Promotion/Fall Prevention: safety round/check completed  Taken 7/7/2022 0800 by Licha Galvez RN  Safety Promotion/Fall Prevention: safety round/check completed     Problem: Behavioral Health Comorbidity  Goal: Maintenance of Behavioral Health Symptom Control  Outcome: Ongoing, Progressing  Intervention: Maintain Behavioral Health Symptom Control  Recent Flowsheet Documentation  Taken 7/7/2022 1600 by Licha Galvez RN  Medication Review/Management:   infusion titrated   high-risk medications identified  Taken 7/7/2022 1200 by Licha Galvez RN  Medication Review/Management:   infusion titrated   high-risk medications identified  Taken 7/7/2022 0800 by Licha Galvez RN  Medication Review/Management:   infusion titrated   high-risk medications identified     Problem: Restraint, Nonbehavioral (Nonviolent)  Goal: Absence of Harm or Injury  Outcome: Ongoing, Progressing  Intervention: Implement Least Restrictive Safety Strategies  Recent Flowsheet Documentation  Taken 7/7/2022 1600 by Licha Galvez RN  Medical Device Protection: tubing secured  Taken 7/7/2022 1200 by Licha Galvez RN  Medical Device Protection: tubing secured  Taken 7/7/2022 0800 by Licha Galvez RN  Medical Device Protection: tubing secured  Intervention: Protect Skin and Joint Integrity  Recent Flowsheet Documentation  Taken 7/7/2022 1600 by Licha Galvez  RN  Body Position: turned  Taken 7/7/2022 1200 by Licha Galvez RN  Body Position: turned  Taken 7/7/2022 1000 by Licha Galvez RN  Body Position: turned  Taken 7/7/2022 0800 by Licha Galvez RN  Body Position: turned

## 2022-07-07 NOTE — PROGRESS NOTES
TELE-ICU Night Intensivist:  Called by bedside nursing re: vent dyssynchrony and agitation.    Reviewed patient's labs and radiographic imaging and utilized the camera to view the vent graphics and discuss with bedside RT as well as view the patient.    Patient with protracted expiratory phase and evidence of mild airtrapping. Ventilating pressures reasonable.  Does not sound actively wheezy with reasonable air movement according to the RT.  Patient active smoker with 90+ pk/yrs  Possible R lower lung field mild infiltrate    Plan:  1. Minor vent adjustments-shorten I time.  2. Scheduled Duonebs  3. Steroid burst-IV solumedrol 40 mg IV Q 8 hrs X 24 hrs then Q 12 hrs x 48 hrs then Q 24 hrs X 48 hrs.  4. Sedation alteration-increase fentanyl gtt, decrease propofol gtt as able.      Kate Heaton MD  #[id number]

## 2022-07-07 NOTE — PROGRESS NOTES
:      to follow patient for future discharge planning needs.     REECE Rodriguez on 7/7/2022 at 10:20 AM

## 2022-07-07 NOTE — PHARMACY-VANCOMYCIN DOSING SERVICE
"Pharmacy Vancomycin Note  Date of Service 2022  Patient's  1950   72 year old, male    Indication: Ventilator-Associated Pneumonia  Day of Therapy: 2  Current vancomycin regimen:  1250 mg IV q24h  Current vancomycin monitoring method: AUC  Current vancomycin therapeutic monitoring goal: 400-600 mg*h/L    Current estimated CrCl = Estimated Creatinine Clearance: 46.5 mL/min (A) (based on SCr of 1.61 mg/dL (H)).    Creatinine for last 3 days  2022:  6:16 AM Creatinine 1.15 mg/dL; 12:05 PM Creatinine 1.30 mg/dL  2022:  5:09 AM Creatinine 1.54 mg/dL  2022:  5:31 AM Creatinine 1.61 mg/dL    Recent Vancomycin Levels (past 3 days)  No results found for requested labs within last 72 hours.    Vancomycin IV Administrations (past 72 hours)                   vancomycin (VANCOCIN) 1,250 mg in sodium chloride 0.9 % 250 mL intermittent infusion (mg) 1,250 mg New Bag 22 1204                Nephrotoxins and other renal medications (From now, onward)    Start     Dose/Rate Route Frequency Ordered Stop    22 1000  vancomycin (VANCOCIN) 1,250 mg in sodium chloride 0.9 % 250 mL intermittent infusion         1,250 mg  over 90 Minutes Intravenous EVERY 24 HOURS 22 0819 22 0959    22 1400  piperacillin-tazobactam (ZOSYN) 4.5 g vial to attach to  mL bag        Note to Pharmacy: For [facility], [facility] and [facility]: For Zosyn-naive patients, use the \"Zosyn initial dose + extended infusion\" order panel.    4.5 g  over 30 Minutes Intravenous EVERY 6 HOURS 22 1238      22 2330  norepinephrine (LEVOPHED) 4 mg in  mL PERIPHERAL infusion         0.03-0.125 mcg/kg/min × 86.2 kg  9.7-40.4 mL/hr  Intravenous CONTINUOUS 22 2318      22 1330  [Held by provider]  lisinopril (ZESTRIL) tablet 20 mg        (Held by provider since Thu 2022 at 0821 by Alan Ingram MD.Hold Reason: Change in Vitals.)    20 mg Oral DAILY 22 1320               Contrast Orders - " past 72 hours (72h ago, onward)    None          Interpretation of current regimen:    Has serum creatinine changed greater than 50% in last 72 hours: No    Urine output:  diminished urine output    Renal Function: Worsening    [employer] Prediction of Planned New Vancomycin Regimen    Regimen: 1250 mg IV every 24 hours.  Start time:1000 on 07/07/2022  Exposure target: AUC24 (range)400-600 mg/L.hr   AUC24,ss: 500 mg/L.hr  Probability of AUC24 > 400: 75 %  Ctrough,ss: 15.9 mg/L  Probability of Ctrough,ss > 20: 27 %  Probability of nephrotoxicity (Lodise EDWARD 2009): 11 %    Plan:  1. Continue Current Dose. Vancomycin order expires tomorrow at 48 hours. If to continue, would consider drawing an AM level to assist with dosing in lieu of declining renal function.   2. Vancomycin monitoring method: AUC  3. Vancomycin therapeutic monitoring goal: 400-600 mg*h/L  4. Pharmacy will check vancomycin levels as appropriate in 1-3 Days.  5. Serum creatinine levels will be ordered daily for the first week of therapy and at least twice weekly for subsequent weeks.    Arianna Villanueva, [facility]

## 2022-07-07 NOTE — PROGRESS NOTES
[facility]    Medicine Progress Note - Hospitalist Service    Date of Admission:  6/17/2022  Day 19 of hospitalization.       Assessment & Plan    Antonio Rutherford is a 72 year old male admitted on 6/17/2022. He presented from the [facility] for concerns of possible kidney stone versus sepsis.     Patient was admitted to the [facility] 7 days PTA.  He is a daily drinker of whiskey and was treated for alcohol withdrawal at the [facility] and then started on Bactrim for possible UTI 4 days PTA.  He had extremely poor oral intake PTA (20 oz in 7d per [facility] nurse report).     Patient was admitted treated with thiamine, folate and IV fluids, strict I's and O's and close monitoring.  Developed worsening confusion and somnolence and concern to protect his airway.  Got intubated on 6/21/2022, extubated 6/24/2022. reintubated on 7/5 for code blue vs aspiration event. Remains intubated in ICU today.        Encephalopathy, metabolic.  CT and MRI head/brain negative for acute findings. Likely due to ICU acquired delirium at this time.  Cannot exclude encephalopathy , he should be out of the timeframe for withdrawal.     Plan:   -Delirium order set  -Appreciate PT/OT input for dispo needs  -adding vanco/zosyn to cover for possible sepsis. Follow up repeat blood cultures    Respiratory acidosis.  Vent management per telemetry ICU.  Appreciate recommendations.    Community-acquired versus aspiration pneumonia: Possibly contributing to need for intubation with possible component of aspiration given alcohol abuse  -Completed 7-day course of Zosyn, restarted on 7/6   -per discussion with family patient has required dilation of the esophagus in the past, cannot exclude achalasia as contributing to his symptoms. He is not currently a candidate for endoscopy but this can be discussed when he is more stable.     Alcohol dependence with withdrawal delirium (H):   Assessment: Resolved.  Sedated and intubated in the ICU on 6/21/22 for  somnolence, extubated on 6/24/2022 then reintubated for code, probably due to aspiration. Ammonia level was normal, ABG did not show significant CO2 retention, head CT and brain MRI neg for acute findings. Repeat CT of the head negative.    Melatonin/Seroquel at night.    -Discontinued CIWA on 6/27  -Minimize sedating medication  -In discussion with social work patient does NOT need to return to assisted on discharge.        pressure ulcer of buttocks with area of unstageable eschar  Heel pressure ulcer stage 1, Left foot.   Present on admission  -offload   -Air mattress  -Continue Escobedo to minimize skin breakdown  -had rectal tube with diarrhea which was discontinued and diarrhea has resolved     Acute PE, POA: Diagnosed 6/17, likely provoked from immobility and acute illness.  -Initially started on lovenox 1.5mg/kg/d now able to take oral medication  -Continue Xarelto for PE treatment  Elevated troponin.  Likely due to strain from ongoing illness and    Elevated troponin.  Not present on admission.    Aortic valve echodensity: Probable chronic calcific nodule, could also represent small clot, unlikely to represent infective endocarditis process with initial negative blood cultures.  No evidence of CVA given MRI findings and at this point given how critically ill he has been will anticoagulate as above, monitor for clinical improvement and consider repeat ELLEN as an outpatient to ensure stability/resolution.  -Follow-up repeat blood cultures showed no growth  -repeating echo due to elevated troponin; trend cardiac enzymes  -EKG     CHARLOTTE due to ATN (acute tubular necrosis) (H): Resolved, creatinine peaked at 2.7, likely from prerenal component from hypovolemia and infection and possible ATN from Bactrim/ibuprofen prior to admission. Had decompensated renal function today, noted.     Plan:  -monitor daily BMP  -monitor zosyn/renal function  -avoid NSAIDs and nephrotoxins     UTI. POA: Resolved.  Empirically treated with  Bactrim, Urine culture negative.  No growth in initial urine culture. Blood cultures negative.   Now with low-grade fever and UA suggestive of possible bacterial infection.  Rocephin 1 g daily started, switched to zosyn. Escobedo placed on admission. Possible CAUTI.      Essential hypertension: Stable.  Has been hypotensive while on pressors.  Home oral medications on hold for now.  Restart when able.     Severe protein calorie malnutrition: Generally poor oral intake.    -Inpatient dietary eval  -TPN started 7/6/2022, central line placed 7/6/2022.      Sinus tachycardia: Resolved, likely from withdrawal.  Got amiodarone x24 hours. Now HR regular and fairly well controlled, moderate elevation especially with agitation.  Suspect cardiac enzymes as noted above.  Continue to trend repeat echo     diarrhea: Resolved  -C. difficile and stool culture negative.    -Imodium as needed     Smoker.  completed course of steroids x1. Repeat dose ordered per tele-icu  -Steroid burst-IV solumedrol 40 mg IV Q 8 hrs X 24 hrs then Q 12 hrs x 48 hrs then Q 24 hrs X 48 hrs.     Hypokalemia  Likely from poor oral intake.  Improved.  Replace per protocol-     Malnutrition, down 20 pounds since admission.  Prealbumin has now fallen down to 12.   Patient needs assistance with eating at this time.  Continue with high-calorie supplements.  Folate and B12 were normal. Nutrition consult, start TPN. Hopefully will be extubated today and able to start taking better PO nutrition and hydration.      Hypotension likely due to propofol sedation.  hold antihypertensives as needed. Currently on pressors which he required with sedation and intubation last week.   -Was on lisinopril prior to admission.  Will resume when blood pressure is normalized.  -Metoprolol started this admission.  Will restart when appropriate.  -As needed hydralazine and labetalol ordered    Encephalopathy, metabolic. CT and MRI head/brain negative for acute findings.   Assessment:   Likely due to hospital-acquired, ICU acquired delirium at this time.  Cannot exclude encephalopathy from acute infection which should all be improving at this time.  He should be out of the timeframe for withdrawal.  Suspect more likely delirium at this time.  Plan: Continue current treatment plan.  Minimize tethers.  Delirium order set.  Will need placement, unsure if he is a candidate. Came from group home. Will need to discuss with social work.     Prognosis: guarded. Many comorbidities, reintubation, malnutrition, delirium.        Diet: Minced & Moist Diet (level 5) Thin Liquids (level 0)  parenteral nutrition - Clinimix E    DVT Prophylaxis: DOAC  Escobedo Catheter: PRESENT, indication: Strict 1-2 Hour I&O;Deep Sedation/Paralysis, Strict 1-2 Hour I&O  Central Lines: PRESENT  CVC TRIPLE LUMEN Left Internal jugular Power injectable-Site Assessment: WDL  Cardiac Monitoring: None  Code Status: Full Code      Total critical care time: 60min    Disposition Plan       Unclear, at least several days. Plan was to return to group home when stable. May need ARU. Working with SW and family.     The patient's care was discussed with the Patient's Family.    Erna Shelton DO  Hospitalist Service  [facility]  Securely message with the Vocera Web Console (learn more here)  Text page via [employer] Paging/Directory         Clinically Significant Risk Factors Present on Admission                      ______________________________________________________________________    Interval History   No new significant events overnight other than issues with vent management per telemetry ICU.  Please see their note for details.  Started on burst of steroids.  TPN started his blood sugars are now elevated.  I have added Lantus today.  We will update family later today.  Prognosis is guarded.    Data reviewed today: I reviewed all medications, new labs and imaging results over the last 24 hours. I personally reviewed no images or EKG's  today.    Physical Exam   Vital Signs: Temp: 98.9  F (37.2  C) Temp src: Temporal BP: 108/53 Pulse: 70   Resp: 16 SpO2: 96 % O2 Device: Mechanical Ventilator    Weight: 208 lbs 1.83 oz  General Appearance: Intubated and sedated.  Appears much more weak, deconditioned and with some significant weight loss since admission.  Respiratory: Coarse sounds with ventilator but no obvious wheezing or rales.  Intubated.  Cardiovascular: Regular.  Poor distal pulses but palpable  GI: Abdomen soft, nontender, nondistended  Skin: Did not check skin wounds today.  Will repeat check tomorrow.  Other:      Data   Recent Labs   Lab 07/07/22  0531 07/06/22  1613 07/06/22  0509 07/05/22  2227 07/05/22  1205   WBC 16.0*  --  18.0*  --  12.7*   HGB 10.4*  --  12.0*  --  12.9*   *  --  103*  --  104*     --  220  --  264   INR 1.24*  --   --   --   --      --  139  --  139   POTASSIUM 4.3  --  4.3  --  3.7   CHLORIDE 106  --  105  --  101   CO2 23  --  21  --  26   BUN 32*  --  31*  --  26*   CR 1.61*  --  1.54*  --  1.30   ANIONGAP 7  --  13  --  12   PAWAN 8.0*  --  7.9*  --  8.7   * 91 107*   < > 164*   ALBUMIN 2.5*  --   --   --  3.3*   PROTTOTAL 5.3*  --   --   --  6.5   BILITOTAL 0.6  --   --   --  0.7   ALKPHOS 41  --   --   --  61   ALT 44  --   --   --  115*   AST 28  --   --   --  155*    < > = values in this interval not displayed.     Recent Results (from the past 24 hour(s))   XR Chest Port 1 View    Narrative    PROCEDURE:  XR CHEST PORT 1 VIEW    HISTORY: for central line placement. .    COMPARISON:  CT chest 6/21/2022    FINDINGS:  A left central line terminates in projection with the mid mediastinum.  This may be within the innominate vein.  The cardiomediastinal contours are stable.  Patchy airspace opacities are new when compared to the immediate  prior. No pneumothorax.      Impression    IMPRESSION:  No pneumothorax following central line placement.      SHARON FINLEY MD         SYSTEM  ID:  [id number]

## 2022-07-08 ENCOUNTER — APPOINTMENT (OUTPATIENT)
Dept: CT IMAGING | Facility: OTHER | Age: 72
DRG: 673 | End: 2022-07-08
Attending: INTERNAL MEDICINE
Payer: MEDICARE

## 2022-07-08 ENCOUNTER — APPOINTMENT (OUTPATIENT)
Dept: ULTRASOUND IMAGING | Facility: OTHER | Age: 72
DRG: 673 | End: 2022-07-08
Attending: FAMILY MEDICINE
Payer: MEDICARE

## 2022-07-08 LAB
ANION GAP SERPL CALCULATED.3IONS-SCNC: 10 MMOL/L (ref 3–14)
APTT PPP: 103 SECONDS (ref 22–38)
APTT PPP: 59 SECONDS (ref 22–38)
APTT PPP: 76 SECONDS (ref 22–38)
APTT PPP: 80 SECONDS (ref 22–38)
ATRIAL RATE - MUSE: 71 BPM
ATRIAL RATE - MUSE: 85 BPM
ATRIAL RATE - MUSE: 86 BPM
BACTERIA UR CULT: NO GROWTH
BASE EXCESS BLDA CALC-SCNC: -7 MMOL/L (ref -9–1.8)
BUN SERPL-MCNC: 33 MG/DL (ref 7–25)
CALCIUM SERPL-MCNC: 8.3 MG/DL (ref 8.6–10.3)
CHLORIDE BLD-SCNC: 111 MMOL/L (ref 98–107)
CK SERPL-CCNC: 93 U/L (ref 30–223)
CO2 SERPL-SCNC: 21 MMOL/L (ref 21–31)
CREAT SERPL-MCNC: 1.41 MG/DL (ref 0.7–1.3)
DIASTOLIC BLOOD PRESSURE - MUSE: NORMAL MMHG
ERYTHROCYTE [DISTWIDTH] IN BLOOD BY AUTOMATED COUNT: 13.6 % (ref 10–15)
GFR SERPL CREATININE-BSD FRML MDRD: 52 ML/MIN/1.73M2
GLUCOSE BLD-MCNC: 221 MG/DL (ref 70–105)
GLUCOSE BLDC GLUCOMTR-MCNC: 111 MG/DL (ref 70–99)
GLUCOSE BLDC GLUCOMTR-MCNC: 131 MG/DL (ref 70–99)
GLUCOSE BLDC GLUCOMTR-MCNC: 135 MG/DL (ref 70–99)
GLUCOSE BLDC GLUCOMTR-MCNC: 137 MG/DL (ref 70–99)
GLUCOSE BLDC GLUCOMTR-MCNC: 139 MG/DL (ref 70–99)
GLUCOSE BLDC GLUCOMTR-MCNC: 139 MG/DL (ref 70–99)
GLUCOSE BLDC GLUCOMTR-MCNC: 140 MG/DL (ref 70–99)
GLUCOSE BLDC GLUCOMTR-MCNC: 141 MG/DL (ref 70–99)
GLUCOSE BLDC GLUCOMTR-MCNC: 142 MG/DL (ref 70–99)
GLUCOSE BLDC GLUCOMTR-MCNC: 153 MG/DL (ref 70–99)
GLUCOSE BLDC GLUCOMTR-MCNC: 156 MG/DL (ref 70–99)
GLUCOSE BLDC GLUCOMTR-MCNC: 167 MG/DL (ref 70–99)
GLUCOSE BLDC GLUCOMTR-MCNC: 168 MG/DL (ref 70–99)
GLUCOSE BLDC GLUCOMTR-MCNC: 185 MG/DL (ref 70–99)
GLUCOSE BLDC GLUCOMTR-MCNC: 186 MG/DL (ref 70–99)
GLUCOSE BLDC GLUCOMTR-MCNC: 220 MG/DL (ref 70–99)
GLUCOSE BLDC GLUCOMTR-MCNC: 287 MG/DL (ref 70–99)
GLUCOSE BLDC GLUCOMTR-MCNC: 292 MG/DL (ref 70–99)
GLUCOSE BLDC GLUCOMTR-MCNC: 296 MG/DL (ref 70–99)
HCO3 BLD-SCNC: 21 MMOL/L (ref 21–28)
HCT VFR BLD AUTO: 31.9 % (ref 40–53)
HGB BLD-MCNC: 10.3 G/DL (ref 13.3–17.7)
INTERPRETATION ECG - MUSE: NORMAL
MAGNESIUM SERPL-MCNC: 2.3 MG/DL (ref 1.9–2.7)
MCH RBC QN AUTO: 34.1 PG (ref 26.5–33)
MCHC RBC AUTO-ENTMCNC: 32.3 G/DL (ref 31.5–36.5)
MCV RBC AUTO: 106 FL (ref 78–100)
O2/TOTAL GAS SETTING VFR VENT: 30 %
OXYHGB MFR BLD: 94 % (ref 92–100)
P AXIS - MUSE: 54 DEGREES
P AXIS - MUSE: 62 DEGREES
P AXIS - MUSE: 95 DEGREES
PCO2 BLD: 47 MM HG (ref 35–45)
PH BLD: 7.25 [PH] (ref 7.35–7.45)
PHOSPHATE SERPL-MCNC: 2.5 MG/DL (ref 2.5–5)
PLATELET # BLD AUTO: 167 10E3/UL (ref 150–450)
PO2 BLD: 77 MM HG (ref 80–105)
POTASSIUM BLD-SCNC: 3 MMOL/L (ref 3.5–5.1)
POTASSIUM BLD-SCNC: 4.6 MMOL/L (ref 3.5–5.1)
PR INTERVAL - MUSE: 166 MS
PR INTERVAL - MUSE: 168 MS
PR INTERVAL - MUSE: 194 MS
QRS DURATION - MUSE: 100 MS
QRS DURATION - MUSE: 100 MS
QRS DURATION - MUSE: 98 MS
QT - MUSE: 390 MS
QT - MUSE: 396 MS
QT - MUSE: 424 MS
QTC - MUSE: 460 MS
QTC - MUSE: 466 MS
QTC - MUSE: 471 MS
R AXIS - MUSE: 65 DEGREES
R AXIS - MUSE: 78 DEGREES
R AXIS - MUSE: 79 DEGREES
RBC # BLD AUTO: 3.02 10E6/UL (ref 4.4–5.9)
SODIUM SERPL-SCNC: 142 MMOL/L (ref 134–144)
SYSTOLIC BLOOD PRESSURE - MUSE: NORMAL MMHG
T AXIS - MUSE: -89 DEGREES
T AXIS - MUSE: 253 DEGREES
T AXIS - MUSE: 63 DEGREES
TRIGL SERPL-MCNC: 154 MG/DL
TROPONIN I SERPL-MCNC: 1481.2 PG/ML (ref 0–34)
TROPONIN I SERPL-MCNC: 1993.3 PG/ML (ref 0–34)
VENTRICULAR RATE- MUSE: 71 BPM
VENTRICULAR RATE- MUSE: 85 BPM
VENTRICULAR RATE- MUSE: 86 BPM
WBC # BLD AUTO: 15.3 10E3/UL (ref 4–11)

## 2022-07-08 PROCEDURE — 80048 BASIC METABOLIC PNL TOTAL CA: CPT | Performed by: FAMILY MEDICINE

## 2022-07-08 PROCEDURE — 250N000013 HC RX MED GY IP 250 OP 250 PS 637: Performed by: FAMILY MEDICINE

## 2022-07-08 PROCEDURE — 250N000011 HC RX IP 250 OP 636: Performed by: INTERNAL MEDICINE

## 2022-07-08 PROCEDURE — 250N000011 HC RX IP 250 OP 636: Performed by: FAMILY MEDICINE

## 2022-07-08 PROCEDURE — 93970 EXTREMITY STUDY: CPT

## 2022-07-08 PROCEDURE — 94640 AIRWAY INHALATION TREATMENT: CPT

## 2022-07-08 PROCEDURE — 36415 COLL VENOUS BLD VENIPUNCTURE: CPT | Performed by: INTERNAL MEDICINE

## 2022-07-08 PROCEDURE — 94640 AIRWAY INHALATION TREATMENT: CPT | Mod: 76

## 2022-07-08 PROCEDURE — 999N000157 HC STATISTIC RCP TIME EA 10 MIN

## 2022-07-08 PROCEDURE — 36600 WITHDRAWAL OF ARTERIAL BLOOD: CPT | Performed by: FAMILY MEDICINE

## 2022-07-08 PROCEDURE — 250N000009 HC RX 250: Performed by: FAMILY MEDICINE

## 2022-07-08 PROCEDURE — 85014 HEMATOCRIT: CPT | Performed by: FAMILY MEDICINE

## 2022-07-08 PROCEDURE — 94003 VENT MGMT INPAT SUBQ DAY: CPT

## 2022-07-08 PROCEDURE — 85730 THROMBOPLASTIN TIME PARTIAL: CPT | Performed by: FAMILY MEDICINE

## 2022-07-08 PROCEDURE — 84484 ASSAY OF TROPONIN QUANT: CPT | Performed by: FAMILY MEDICINE

## 2022-07-08 PROCEDURE — 70450 CT HEAD/BRAIN W/O DYE: CPT | Mod: MA

## 2022-07-08 PROCEDURE — 84478 ASSAY OF TRIGLYCERIDES: CPT | Performed by: FAMILY MEDICINE

## 2022-07-08 PROCEDURE — 250N000009 HC RX 250: Performed by: INTERNAL MEDICINE

## 2022-07-08 PROCEDURE — 84484 ASSAY OF TROPONIN QUANT: CPT | Performed by: INTERNAL MEDICINE

## 2022-07-08 PROCEDURE — 84100 ASSAY OF PHOSPHORUS: CPT | Performed by: FAMILY MEDICINE

## 2022-07-08 PROCEDURE — 36415 COLL VENOUS BLD VENIPUNCTURE: CPT | Performed by: FAMILY MEDICINE

## 2022-07-08 PROCEDURE — 84132 ASSAY OF SERUM POTASSIUM: CPT | Performed by: FAMILY MEDICINE

## 2022-07-08 PROCEDURE — 99232 SBSQ HOSP IP/OBS MODERATE 35: CPT | Performed by: INTERNAL MEDICINE

## 2022-07-08 PROCEDURE — C9113 INJ PANTOPRAZOLE SODIUM, VIA: HCPCS | Performed by: INTERNAL MEDICINE

## 2022-07-08 PROCEDURE — 200N000001 HC R&B ICU

## 2022-07-08 PROCEDURE — 85730 THROMBOPLASTIN TIME PARTIAL: CPT | Performed by: INTERNAL MEDICINE

## 2022-07-08 PROCEDURE — 82550 ASSAY OF CK (CPK): CPT | Performed by: FAMILY MEDICINE

## 2022-07-08 PROCEDURE — 83735 ASSAY OF MAGNESIUM: CPT | Performed by: FAMILY MEDICINE

## 2022-07-08 PROCEDURE — 82805 BLOOD GASES W/O2 SATURATION: CPT | Performed by: FAMILY MEDICINE

## 2022-07-08 PROCEDURE — 36592 COLLECT BLOOD FROM PICC: CPT | Performed by: FAMILY MEDICINE

## 2022-07-08 RX ORDER — POTASSIUM CHLORIDE 7.45 MG/ML
10 INJECTION INTRAVENOUS
Status: DISPENSED | OUTPATIENT
Start: 2022-07-08 | End: 2022-07-08

## 2022-07-08 RX ORDER — NOREPINEPHRINE BITARTRATE 0.02 MG/ML
.01-.6 INJECTION, SOLUTION INTRAVENOUS CONTINUOUS
Status: DISCONTINUED | OUTPATIENT
Start: 2022-07-08 | End: 2022-07-12 | Stop reason: HOSPADM

## 2022-07-08 RX ADMIN — ASCORBIC ACID, VITAMIN A PALMITATE, CHOLECALCIFEROL, THIAMINE HYDROCHLORIDE, RIBOFLAVIN-5 PHOSPHATE SODIUM, PYRIDOXINE HYDROCHLORIDE, NIACINAMIDE, DEXPANTHENOL, ALPHA-TOCOPHEROL ACETATE, VITAMIN K1, FOLIC ACID, BIOTIN, CYANOCOBALAMIN: 200; 3300; 200; 6; 3.6; 6; 40; 15; 10; 150; 600; 60; 5 INJECTION, SOLUTION INTRAVENOUS at 20:26

## 2022-07-08 RX ADMIN — Medication 1 TABLET: at 12:23

## 2022-07-08 RX ADMIN — Medication 100 MCG: at 10:03

## 2022-07-08 RX ADMIN — Medication 0.09 MCG/KG/MIN: at 15:42

## 2022-07-08 RX ADMIN — HEPARIN SODIUM 1400 UNITS/HR: 10000 INJECTION, SOLUTION INTRAVENOUS at 12:04

## 2022-07-08 RX ADMIN — TAZOBACTAM SODIUM AND PIPERACILLIN SODIUM 4.5 G: 500; 4 INJECTION, SOLUTION INTRAVENOUS at 22:11

## 2022-07-08 RX ADMIN — NYSTATIN: 100000 POWDER TOPICAL at 06:08

## 2022-07-08 RX ADMIN — PROPOFOL 65 MCG/KG/MIN: 10 INJECTION, EMULSION INTRAVENOUS at 10:52

## 2022-07-08 RX ADMIN — INSULIN HUMAN 18 UNITS/HR: 1 INJECTION, SOLUTION INTRAVENOUS at 03:16

## 2022-07-08 RX ADMIN — PROPOFOL 70 MCG/KG/MIN: 10 INJECTION, EMULSION INTRAVENOUS at 16:17

## 2022-07-08 RX ADMIN — PANTOPRAZOLE SODIUM 40 MG: 40 INJECTION, POWDER, FOR SOLUTION INTRAVENOUS at 12:22

## 2022-07-08 RX ADMIN — IPRATROPIUM BROMIDE AND ALBUTEROL SULFATE 3 ML: 2.5; .5 SOLUTION RESPIRATORY (INHALATION) at 14:00

## 2022-07-08 RX ADMIN — PROPOFOL 65 MCG/KG/MIN: 10 INJECTION, EMULSION INTRAVENOUS at 22:02

## 2022-07-08 RX ADMIN — INSULIN HUMAN 4 UNITS/HR: 1 INJECTION, SOLUTION INTRAVENOUS at 17:08

## 2022-07-08 RX ADMIN — PROPOFOL 65 MCG/KG/MIN: 10 INJECTION, EMULSION INTRAVENOUS at 19:00

## 2022-07-08 RX ADMIN — METHYLPREDNISOLONE SODIUM SUCCINATE 40 MG: 40 INJECTION, POWDER, FOR SOLUTION INTRAMUSCULAR; INTRAVENOUS at 22:06

## 2022-07-08 RX ADMIN — IPRATROPIUM BROMIDE AND ALBUTEROL SULFATE 3 ML: 2.5; .5 SOLUTION RESPIRATORY (INHALATION) at 18:00

## 2022-07-08 RX ADMIN — PROPOFOL 70 MCG/KG/MIN: 10 INJECTION, EMULSION INTRAVENOUS at 13:38

## 2022-07-08 RX ADMIN — PROPOFOL 45 MCG/KG/MIN: 10 INJECTION, EMULSION INTRAVENOUS at 03:27

## 2022-07-08 RX ADMIN — FOLIC ACID 1 MG: 1 TABLET ORAL at 12:23

## 2022-07-08 RX ADMIN — IPRATROPIUM BROMIDE AND ALBUTEROL SULFATE 3 ML: 2.5; .5 SOLUTION RESPIRATORY (INHALATION) at 06:18

## 2022-07-08 RX ADMIN — THIAMINE HCL TAB 100 MG 300 MG: 100 TAB at 12:23

## 2022-07-08 RX ADMIN — MIDAZOLAM 2 MG: 1 INJECTION INTRAMUSCULAR; INTRAVENOUS at 09:35

## 2022-07-08 RX ADMIN — Medication 100 MCG/HR: at 06:36

## 2022-07-08 RX ADMIN — POTASSIUM CHLORIDE 10 MEQ: 7.46 INJECTION, SOLUTION INTRAVENOUS at 12:06

## 2022-07-08 RX ADMIN — POTASSIUM CHLORIDE 10 MEQ: 7.46 INJECTION, SOLUTION INTRAVENOUS at 09:45

## 2022-07-08 RX ADMIN — Medication 0.03 MCG/KG/MIN: at 06:08

## 2022-07-08 RX ADMIN — IPRATROPIUM BROMIDE AND ALBUTEROL SULFATE 3 ML: 2.5; .5 SOLUTION RESPIRATORY (INHALATION) at 10:04

## 2022-07-08 RX ADMIN — POTASSIUM CHLORIDE 10 MEQ: 7.46 INJECTION, SOLUTION INTRAVENOUS at 08:16

## 2022-07-08 RX ADMIN — HEPARIN SODIUM 1550 UNITS/HR: 10000 INJECTION, SOLUTION INTRAVENOUS at 13:04

## 2022-07-08 RX ADMIN — NYSTATIN: 100000 POWDER TOPICAL at 12:22

## 2022-07-08 RX ADMIN — Medication 100 MCG: at 00:51

## 2022-07-08 RX ADMIN — NYSTATIN: 100000 POWDER TOPICAL at 22:14

## 2022-07-08 RX ADMIN — Medication 4.5 G: at 06:08

## 2022-07-08 RX ADMIN — TAZOBACTAM SODIUM AND PIPERACILLIN SODIUM 4.5 G: 500; 4 INJECTION, SOLUTION INTRAVENOUS at 17:23

## 2022-07-08 RX ADMIN — Medication 100 MCG: at 08:00

## 2022-07-08 RX ADMIN — Medication 4.5 G: at 12:07

## 2022-07-08 RX ADMIN — PROPOFOL 35 MCG/KG/MIN: 10 INJECTION, EMULSION INTRAVENOUS at 06:53

## 2022-07-08 RX ADMIN — Medication 100 MCG: at 09:00

## 2022-07-08 RX ADMIN — METHYLPREDNISOLONE SODIUM SUCCINATE 40 MG: 40 INJECTION, POWDER, FOR SOLUTION INTRAMUSCULAR; INTRAVENOUS at 12:08

## 2022-07-08 ASSESSMENT — ACTIVITIES OF DAILY LIVING (ADL)
ADLS_ACUITY_SCORE: 50
ADLS_ACUITY_SCORE: 50
ADLS_ACUITY_SCORE: 52
ADLS_ACUITY_SCORE: 52
ADLS_ACUITY_SCORE: 50
ADLS_ACUITY_SCORE: 50
ADLS_ACUITY_SCORE: 52
ADLS_ACUITY_SCORE: 50

## 2022-07-08 NOTE — PROGRESS NOTES
Clinical Nutrition /Reassessment     Assessment:   Client History:  Pt intubated again on 7/5. TPN at 80 ml/hour continuous.       Discussed with pharmacy: will hold the lipids at this time while he is on propofol.   Plan to provide 1 bag of formula (~2000 ml of formula)   Central formula at 80 ml/hour for 24 hours would provide:   1920 ml formula, 1363 kcal (96 g AA, 288 g CHO)   HOLD --250 ml lipids daily (500 kcal)  Daily total calories: ~1863 kcal, 96 g protein  Diet Order: NPO  Weight:   Vitals:    06/17/22 1613 06/18/22 0243 06/21/22 0409 06/22/22 0600   Weight: 96.2 kg (212 lb) 90.2 kg (198 lb 12.8 oz) 90.7 kg (199 lb 15.3 oz) 95.7 kg (210 lb 15.7 oz)    06/24/22 0348 06/25/22 0418 06/26/22 0557 06/27/22 0400   Weight: 100.5 kg (221 lb 9 oz) 99 kg (218 lb 4.1 oz) 96.8 kg (213 lb 6.5 oz) 95.5 kg (210 lb 8.6 oz)    06/27/22 1111 06/28/22 0515 06/29/22 0200 06/29/22 2346   Weight: 93.3 kg (205 lb 11 oz) 92.4 kg (203 lb 11.3 oz) 91.5 kg (201 lb 11.5 oz) 93.3 kg (205 lb 11 oz)    07/01/22 0100 07/02/22 0108 07/04/22 0523 07/05/22 0644   Weight: 92.5 kg (204 lb) 86.3 kg (190 lb 4.1 oz) 85.3 kg (188 lb 0.8 oz) 86.2 kg (190 lb 0.6 oz)    07/05/22 2330 07/07/22 0330   Weight: 90.9 kg (200 lb 6.4 oz) 94.4 kg (208 lb 1.8 oz)     Estimated nutrition needs using current wt: 91 kg (200#):   2587-9503 kcal (25-30 kcal/kg)  109-137 g protein (1.2-1.5 g/kg)  6179-3488 ml fluids (1 ml/kcal)    Malnutrition Criteria:  (Need to have 2 indicators to qualify recommendation)  Energy Intake:  Acute Severe: </= 50% of estimated energy requirement for >/= 5 days  Interpretation of Weight Loss:  No significant weight loss in past year noted per chart review and lack of information in the time frame  Physical Findings:  No significant findings  Reduced  Strength:  noted weakness, likely reduced with current illness    Recommended Nutrition Diagnosis:   Severe Malnutrition in the context of acute illness or injury - based on  AND/ASPEN Clinical Characterstics of Malnutrition May 2012      Nutrition Education: Nutrition education will be provided as appropriate.    Nutrition Diagnosis: Oral or Nutrition Support Intake:  inadequate energy intakes related to refusal to consume oral nutrition/NPO status with intubation as evidenced by minimal/no intakes x past 6+ days and prior to that, poor intakes    Intervention:  Nutrition Prescription:     Nutrition Intervention(s):Recommended general, healthful diet --IDDSI level 5 foods, level 0 fluids-NPO now that he is intubated. On central TPN formula. See above for details.   1. Supplements: TID on trays-will add again when appropriate   2. Staff to assist as needed and allowed for meals/snacks     Nutrition Goal(s):  1. Pt will consume 50% or more at meals- on hold, NPO  2. Pt will not have unplanned wt loss during hospitalization - on going  3. Pt will tolerate diet as ordered- on hold, NPO  4. Pt will tolerate TPN at goal rate     Monitoring and Evaluation:   Food Intake, tolerance, weight, diet advancement  TPN tolerance     Discharge Recommendation:   Nutrition Discharge Planning  Will address closer to discharge     RD will reassess in within 1-5 days or sooner.  Leidy Joyce RD on 7/8/2022 at 11:49 AM

## 2022-07-08 NOTE — PROGRESS NOTES
:      will continue to follow for future discharge planning needs.     REECE Rodriguez on 7/8/2022 at 12:42 PM

## 2022-07-08 NOTE — PROGRESS NOTES
Summary      Patient remained stable throughout the shift no changes made to vent settings patient given morning treatment.     Vent settings   RR 20     P +5   Fio2 30%    RT Heron on 7/8/2022 at 6:25 AM

## 2022-07-08 NOTE — PHARMACY-CONSULT NOTE
"Pharmacy: New TPN Consult     Antonio Rutherford is  72 year old male admitted on 6/17/2022.    does not have any pertinent problems on file.    Past Medical History:   Diagnosis Date     Alcohol abuse      Tobacco dependence        Most recent flowsheet Weight: 94.4 kg (208 lb 1.8 oz)  Most recent flowsheet Height: 177.8 cm (5' 10\")    Intake/Output Summary (Last 24 hours) at 7/8/2022 1302  Last data filed at 7/8/2022 1000  Gross per 24 hour   Intake 4892.76 ml   Output 1625 ml   Net 3267.76 ml       Recent Labs   Lab Test 07/08/22  1155 07/08/22  1048 07/08/22  0443 07/08/22  0439 07/07/22  1047 07/07/22  0531   NA  --   --   --  142  --  136   POTASSIUM  --   --   --  3.0*  --  4.3   CHLORIDE  --   --   --  111*  --  106   CO2  --   --   --  21  --  23   BUN  --   --   --  33*  --  32*   CR  --   --   --  1.41*  --  1.61*   * 131*   < > 221*   < > 263*   PAWAN  --   --   --  8.3*  --  8.0*    < > = values in this interval not displayed.       Recent Labs   Lab Test 07/07/22  0531 07/05/22  1205   ALKPHOS 41 61   PROTTOTAL 5.3* 6.5   BILITOTAL 0.6 0.7   AST 28 155*   ALT 44 115*     Recent Labs   Lab Test 07/08/22  0439 07/07/22  0531   MAG 2.3 1.9   PHOS 2.5 3.3     Recent Labs   Lab Test 07/08/22  0439 07/07/22  0531 07/05/22  1205 07/05/22  0616 07/03/22  0921 07/02/22  0736 06/30/22  1448 06/28/22  0620   ALBUMIN  --  2.5* 3.3*  --  3.2* 3.2* 3.7  --    PREALB  --  6*  --  12*  --   --  20 20   TRIG 154*  --   --   --   --   --   --   --        TPN Type = Central , Continuous  Rate = 80 mL/hr (initiate infusion at  50 mL/hr x 4 hours)        Total                1939 Kcal/day (clinimix + propofol Kcals- lipids discontinued)                          21 Kcal/kg/day (Actual body weight- using 91 kg)     Recommendations: Propofol running on average around 40 mcg/kg/min providing estimated 52.4 g of fat and 575 kcal from lipids per day in addition to TPN. Will discontinue lipids while propofol continues above 20 " mL/min and monitor TGs/CK. Daily lipids per day from just propofol is 0.58 g/kg/day. Decrease in potassium and phosphate (still WNL); on potassium standard replacement protocol. Continue TPN Clinimix E 5/15 at 80mL/hr. Pharmacy will continue to follow and update as appropriate.     Will continue to follow.  Thank you for the consult.    Arianna Villanueva [facility] ....................  7/8/2022   1:02 PM

## 2022-07-08 NOTE — PROGRESS NOTES
[facility]    Medicine Progress Note - Hospitalist Service    Date of Admission:  6/17/2022    Assessment & Plan          Summary of care    -Admitted 6/17 with alcohol withdrawal, acute kidney injury. -Encephalopathy worsened, agitation worsened and transferred to ICU for closer monitoring on 6/19.  - 6/21 worsening respiratory status, agitation. CT chest showed pulmonary embolism. Intubated due to mental status/somnolence.  -Extubated 6/24  -Improved renal function, transitioned from lovenox to xarelto. Physical and occupational therapy consulted and moved to medical floor. Echo showed arotic valve echodensity that will need ELLEN in future. Blood cultures negative. Slow daily improvement in mobility and mental status through July 4.   -More somnolent on 7/5, and became unresponsive. Code Blue called, CPR initiated. ROSC at 5 minutes. Intubated and transferred to ICU. Possible aspiration pneumonia as cause.   -Troponin on 7/7 was 327, increased to 2000, now down to 1400. Echo shows no wall motion abnormalities. Unclear of this was due to CPR, or was an event to cause the 7/5 code blue.       Active Problems:    Acute respiratory failure  Assessment: recurrent, likely due to aspiration. Troponin elevation delayed enough to not thing cardiac arrest was responsible for CODE BLUE.  Plan: continue vent today, plan weaning trial tomorrow.     Aspiration pneumonia   Assessment: not present on admission   Plan: day 3 zosyn, stopped vancomycin after 48 hrs.     Metabolic encephalopathy  Assessment: multifactorial  Plan: sedated at this time    Troponin elevation  Assessment: differential diagnosis includes non-ST elevation MI, resuscitation related trauma, strain, worsening pulmonary embolism. Troponin peaked.  Echo shows good EF and no wall motion abnormalities.   Plan: heparin drip today, transition to lovenox tomorrow.       Tobacco dependence      Alcohol withdrawal (H)    Assessment: it has been  nearly a month since last drink        ATN (acute tubular necrosis) (H)      CHARLOTTE (acute kidney injury) (H)    Assessment: secondary to ATN. Good urine output.     Plan: monitor daily      Pulmonary embolism (H)    Assessment: diagnosed on 6/21 and anticoagulated since then    Plan: continue heparin      Pressure injury of buttock, stage 1    Assessment: present on admission     Plan: wound care, monitor      Aortic valve mass    Assessment: noted on echo. blood culture negative 6/22. 6/27, 7/5. Anticoagulated.  Will need ELLEN in future    Severe protein calorie malutrition  Assessment: started TPN, lipids on 7/6       Diet: Minced & Moist Diet (level 5) Thin Liquids (level 0)  parenteral nutrition - Clinimix E    DVT Prophylaxis: Heparin drip   Escobedo Catheter: PRESENT, indication: Strict 1-2 Hour I&O;Deep Sedation/Paralysis, Strict 1-2 Hour I&O  Central Lines: PRESENT  CVC TRIPLE LUMEN Left Internal jugular Power injectable-Site Assessment: WDL  Cardiac Monitoring: None  Code Status: Full Code         The patient's care was discussed with the Bedside Nurse.    Sameer De Oliveira MD  Hospitalist Service  [facility]  Securely message with the Vocera Web Console (learn more here)  Text page via [email or web address] Paging/Directory         Clinically Significant Risk Factors Present on Admission                      ______________________________________________________________________    Interval History   Sedated.    Data reviewed today: I reviewed all medications, new labs and imaging results over the last 24 hours. I personally reviewed the echo image(s) showing no WMA, good EF.    Physical Exam   Vital Signs: Temp: 97.1  F (36.2  C) Temp src: Temporal BP: (!) 144/79 Pulse: 71   Resp: 18 SpO2: 95 % O2 Device: Mechanical Ventilator    Weight: 208 lbs 1.83 oz  GENERAL: Sedated on vent   HEENT: Anicteric, non-injected sclera, mouth moist.   NECK: No JVD.  CARDIOVASCULAR: regular rate and rhythm, no murmur. No  lower extremity edema   RESPIRATORY: Clear to auscultation bilaterally, no wheezes, no crackles.  GI: Non-distended, normal bowel sounds, soft, non-tender.  SKIN: stage 2 ulcer at coccyx and left ischial tuberosity  NEUROLOGY: gag reflex present      Data   Recent Labs   Lab 07/08/22  1308 07/08/22  1155 07/08/22  1048 07/08/22  0544 07/08/22  0542 07/08/22  0443 07/08/22  0439 07/07/22  1047 07/07/22  0531 07/06/22  1613 07/06/22  0509 07/05/22  2227 07/05/22  1205   WBC  --   --   --   --  15.3*  --   --   --  16.0*  --  18.0*  --  12.7*   HGB  --   --   --   --  10.3*  --   --   --  10.4*  --  12.0*  --  12.9*   MCV  --   --   --   --  106*  --   --   --  107*  --  103*  --  104*   PLT  --   --   --   --  167  --   --   --  171  --  220  --  264   INR  --   --   --   --   --   --   --   --  1.24*  --   --   --   --    NA  --   --   --   --   --   --  142  --  136  --  139  --  139   POTASSIUM  --   --   --   --   --   --  3.0*  --  4.3  --  4.3  --  3.7   CHLORIDE  --   --   --   --   --   --  111*  --  106  --  105  --  101   CO2  --   --   --   --   --   --  21  --  23  --  21  --  26   BUN  --   --   --   --   --   --  33*  --  32*  --  31*  --  26*   CR  --   --   --   --   --   --  1.41*  --  1.61*  --  1.54*  --  1.30   ANIONGAP  --   --   --   --   --   --  10  --  7  --  13  --  12   PAWAN  --   --   --   --   --   --  8.3*  --  8.0*  --  7.9*  --  8.7   * 139* 131*   < >  --    < > 221*   < > 263*   < > 107*   < > 164*   ALBUMIN  --   --   --   --   --   --   --   --  2.5*  --   --   --  3.3*   PROTTOTAL  --   --   --   --   --   --   --   --  5.3*  --   --   --  6.5   BILITOTAL  --   --   --   --   --   --   --   --  0.6  --   --   --  0.7   ALKPHOS  --   --   --   --   --   --   --   --  41  --   --   --  61   ALT  --   --   --   --   --   --   --   --  44  --   --   --  115*   AST  --   --   --   --   --   --   --   --  28  --   --   --  155*    < > = values in this interval not displayed.      Recent Results (from the past 24 hour(s))   Echo Limited   Result Value    LVEF  60-65%    [facility]    [id number]  [id number]  [id number]  [id number]^EUGENIO^GORDO^DALLAS     [facility]  [address] [place] [address]     Name: ALEJANDRA MAIN  MRN: [id number]  : 1950  Study Date: 2022 02:05 PM  Age: 72 yrs  Gender: Male  Patient Location: [facility]  Reason For Study: Chest Pain  Ordering Physician: GORDO BECKER  Performed By: Louise Glasgow RDCS, GIAT     BSA: 2.1 m2  Height: 70 in  Weight: 208 lb  HR: 72  BP: 119/56 mmHg  ______________________________________________________________________________  Procedure  Limited Portable Echo Adult.  ______________________________________________________________________________  Interpretation Summary  Limited echo.  Global and regional left ventricular function is normal with an EF of 60-65%.  Global right ventricular function is normal.  No pericardial effusion is present.     No change in biventricular function compared with yesterday's echo.  ______________________________________________________________________________  Left Ventricle  Global and regional left ventricular function is normal with an EF of 60-65%.     Right Ventricle  The right ventricle is normal size. Global right ventricular function is  normal.     Mitral Valve  The mitral valve is normal.     Vessels  Unable to assess mean RA pressure given the patient is on a ventilator.     Pericardium  No pericardial effusion is present.     ______________________________________________________________________________  Doppler Measurements & Calculations  MV E max juan luis: 86.5 cm/sec  MV A max juan luis: 69.8 cm/sec  MV E/A: 1.2  MV dec slope: 510.0 cm/sec2  MV dec time: 0.17 sec  PA acc time: 0.10 sec     ______________________________________________________________________________  Report approved by: Jasmyne Deshpande 2022 03:39 PM         US Lower Extremity Venous Duplex  Bilateral    Narrative    Exam:US LOWER EXTREMITY VENOUS DUPLEX BILATERAL    History:  72 years Male   : Shortness of breath, elevated troponin.    Comparisons:    Technique: Venous duplex ultrasonography of the bilateral lower  extremities was performed.     Findings: The common femoral veins, superficial femoral veins and  popliteal veins are fully compressible with spontaneous and  augmentable venous flow.           Impression    Impression: No evidence of deep venous thrombosis within the lower  extremities.    ASH FOX MD         SYSTEM ID:  [id number]     Medications     dextrose       dextrose       fentanyl 100 mcg/hr (07/08/22 1142)     heparin 1,550 Units/hr (07/08/22 1304)     insulin regular 2 Units/hr (07/08/22 1309)     norepinephrine 0.1 mcg/kg/min (07/08/22 1220)     parenteral nutrition - Clinimix E       parenteral nutrition - Clinimix E 80 mL/hr at 07/07/22 2130     propofol (DIPRIVAN) infusion 70 mcg/kg/min (07/08/22 1338)     sodium chloride 50 mL/hr at 07/07/22 2328     sodium chloride 10 mL/hr at 07/04/22 2131       folic acid  1 mg Oral Daily     ipratropium - albuterol 0.5 mg/2.5 mg/3 mL  3 mL Nebulization 4x daily     [Held by provider] lisinopril  20 mg Oral Daily     methylPREDNISolone  40 mg Intravenous Q12H    Followed by     [START ON 7/10/2022] methylPREDNISolone  40 mg Intravenous Q24H     [Held by provider] metoprolol tartrate  25 mg Oral BID     multivitamin w/minerals  1 tablet Oral Daily     nystatin   Topical BID     pantoprazole (PROTONIX) IV  40 mg Intravenous Daily     piperacillin-tazobactam  4.5 g Intravenous Q6H     [Held by provider] QUEtiapine  100 mg Oral At Bedtime     sodium chloride (PF)  10-40 mL Intracatheter Q8H     sodium chloride (PF)  3 mL Intracatheter Q8H     thiamine  300 mg Oral Daily

## 2022-07-08 NOTE — PROGRESS NOTES
- 72  M a/w PEA arrest with resultant shock, resp failure, D3 on vent.    - Patient has not been able to tolerate coming down on sedation.  Will do an Mri of brain to further asses for poor mentation.  Consdier diuresis.     Luis Enrique Le MD

## 2022-07-08 NOTE — PLAN OF CARE
"/67   Pulse 72   Temp 97.1  F (36.2  C) (Temporal)   Resp 11   Ht 1.778 m (5' 10\")   Wt 94.4 kg (208 lb 1.8 oz)   SpO2 93%   BMI 29.86 kg/m      Afebrile, Sedation medication titrated as needed to stabilize vitals, intermittently fighting vent.   Heparin drip 1400 at 6am, glucose with in range at 4 unit(s)/hr.  Adequate output. Wound dressings changed.  Carolina Menjivar RN.............................7/8/2022 12:24 PM    "

## 2022-07-08 NOTE — PROGRESS NOTES
Shift Summary  Pt is intubated and on Mechanical ventilation with current settings of CMV-20/500/30/+5. No changes in settings today. Pt shows signs of agitation when sedation is decreased or moved. No further interventions performed.    Marisela Mendes, RT

## 2022-07-09 ENCOUNTER — APPOINTMENT (OUTPATIENT)
Dept: GENERAL RADIOLOGY | Facility: OTHER | Age: 72
DRG: 673 | End: 2022-07-09
Attending: INTERNAL MEDICINE
Payer: MEDICARE

## 2022-07-09 LAB
ALBUMIN SERPL-MCNC: 2.4 G/DL (ref 3.5–5.7)
ALP SERPL-CCNC: 49 U/L (ref 34–104)
ALT SERPL W P-5'-P-CCNC: 31 U/L (ref 7–52)
ANION GAP SERPL CALCULATED.3IONS-SCNC: 10 MMOL/L (ref 3–14)
APTT PPP: 103 SECONDS (ref 22–38)
APTT PPP: 69 SECONDS (ref 22–38)
APTT PPP: 73 SECONDS (ref 22–38)
APTT PPP: 93 SECONDS (ref 22–38)
AST SERPL W P-5'-P-CCNC: 25 U/L (ref 13–39)
BACTERIA SPT CULT: ABNORMAL
BASE EXCESS BLDA CALC-SCNC: -2.8 MMOL/L (ref -9–1.8)
BASE EXCESS BLDA CALC-SCNC: -6.2 MMOL/L (ref -9–1.8)
BASE EXCESS BLDA CALC-SCNC: -8.1 MMOL/L (ref -9–1.8)
BASE EXCESS BLDA CALC-SCNC: -9.4 MMOL/L (ref -9–1.8)
BILIRUB SERPL-MCNC: 0.4 MG/DL (ref 0.3–1)
BUN SERPL-MCNC: 42 MG/DL (ref 7–25)
CALCIUM SERPL-MCNC: 8 MG/DL (ref 8.6–10.3)
CHLORIDE BLD-SCNC: 110 MMOL/L (ref 98–107)
CO2 SERPL-SCNC: 18 MMOL/L (ref 21–31)
CREAT SERPL-MCNC: 1.66 MG/DL (ref 0.7–1.3)
ERYTHROCYTE [DISTWIDTH] IN BLOOD BY AUTOMATED COUNT: 14 % (ref 10–15)
GFR SERPL CREATININE-BSD FRML MDRD: 43 ML/MIN/1.73M2
GLUCOSE BLD-MCNC: 187 MG/DL (ref 70–105)
GLUCOSE BLDC GLUCOMTR-MCNC: 147 MG/DL (ref 70–99)
GLUCOSE BLDC GLUCOMTR-MCNC: 151 MG/DL (ref 70–99)
GLUCOSE BLDC GLUCOMTR-MCNC: 156 MG/DL (ref 70–99)
GLUCOSE BLDC GLUCOMTR-MCNC: 157 MG/DL (ref 70–99)
GLUCOSE BLDC GLUCOMTR-MCNC: 160 MG/DL (ref 70–99)
GLUCOSE BLDC GLUCOMTR-MCNC: 162 MG/DL (ref 70–99)
GLUCOSE BLDC GLUCOMTR-MCNC: 172 MG/DL (ref 70–99)
GLUCOSE BLDC GLUCOMTR-MCNC: 175 MG/DL (ref 70–99)
GLUCOSE BLDC GLUCOMTR-MCNC: 186 MG/DL (ref 70–99)
GLUCOSE BLDC GLUCOMTR-MCNC: 81 MG/DL (ref 70–99)
GLUCOSE BLDC GLUCOMTR-MCNC: 89 MG/DL (ref 70–99)
GLUCOSE BLDC GLUCOMTR-MCNC: 92 MG/DL (ref 70–99)
GLUCOSE BLDC GLUCOMTR-MCNC: 96 MG/DL (ref 70–99)
GRAM STAIN RESULT: ABNORMAL
HCO3 BLD-SCNC: 19 MMOL/L (ref 21–28)
HCO3 BLD-SCNC: 20 MMOL/L (ref 21–28)
HCO3 BLD-SCNC: 20 MMOL/L (ref 21–28)
HCO3 BLD-SCNC: 23 MMOL/L (ref 21–28)
HCT VFR BLD AUTO: 34.7 % (ref 40–53)
HGB BLD-MCNC: 11 G/DL (ref 13.3–17.7)
LACTATE SERPL-SCNC: 1.9 MMOL/L (ref 0.7–2)
MAGNESIUM SERPL-MCNC: 2.3 MG/DL (ref 1.9–2.7)
MCH RBC QN AUTO: 33.7 PG (ref 26.5–33)
MCHC RBC AUTO-ENTMCNC: 31.7 G/DL (ref 31.5–36.5)
MCV RBC AUTO: 106 FL (ref 78–100)
O2/TOTAL GAS SETTING VFR VENT: 30 %
PCO2 BLD: 40 MM HG (ref 35–45)
PCO2 BLD: 40 MM HG (ref 35–45)
PCO2 BLD: 50 MM HG (ref 35–45)
PCO2 BLD: 53 MM HG (ref 35–45)
PH BLD: 7.17 [PH] (ref 7.35–7.45)
PH BLD: 7.21 [PH] (ref 7.35–7.45)
PH BLD: 7.3 [PH] (ref 7.35–7.45)
PH BLD: 7.36 [PH] (ref 7.35–7.45)
PHOSPHATE SERPL-MCNC: 4.7 MG/DL (ref 2.5–5)
PLATELET # BLD AUTO: 205 10E3/UL (ref 150–450)
PO2 BLD: 78 MM HG (ref 80–105)
PO2 BLD: 88 MM HG (ref 80–105)
POTASSIUM BLD-SCNC: 4.9 MMOL/L (ref 3.5–5.1)
PROT SERPL-MCNC: 5 G/DL (ref 6.4–8.9)
RBC # BLD AUTO: 3.26 10E6/UL (ref 4.4–5.9)
SODIUM SERPL-SCNC: 138 MMOL/L (ref 134–144)
TROPONIN I SERPL-MCNC: 1676.7 PG/ML (ref 0–34)
WBC # BLD AUTO: 19.6 10E3/UL (ref 4–11)

## 2022-07-09 PROCEDURE — 250N000013 HC RX MED GY IP 250 OP 250 PS 637: Performed by: FAMILY MEDICINE

## 2022-07-09 PROCEDURE — 250N000009 HC RX 250: Performed by: INTERNAL MEDICINE

## 2022-07-09 PROCEDURE — 99232 SBSQ HOSP IP/OBS MODERATE 35: CPT | Performed by: INTERNAL MEDICINE

## 2022-07-09 PROCEDURE — 36600 WITHDRAWAL OF ARTERIAL BLOOD: CPT | Performed by: INTERNAL MEDICINE

## 2022-07-09 PROCEDURE — 36415 COLL VENOUS BLD VENIPUNCTURE: CPT | Performed by: INTERNAL MEDICINE

## 2022-07-09 PROCEDURE — 85730 THROMBOPLASTIN TIME PARTIAL: CPT | Performed by: INTERNAL MEDICINE

## 2022-07-09 PROCEDURE — 250N000011 HC RX IP 250 OP 636: Performed by: INTERNAL MEDICINE

## 2022-07-09 PROCEDURE — 258N000003 HC RX IP 258 OP 636: Performed by: INTERNAL MEDICINE

## 2022-07-09 PROCEDURE — 258N000003 HC RX IP 258 OP 636: Performed by: FAMILY MEDICINE

## 2022-07-09 PROCEDURE — 94003 VENT MGMT INPAT SUBQ DAY: CPT

## 2022-07-09 PROCEDURE — 84100 ASSAY OF PHOSPHORUS: CPT | Performed by: FAMILY MEDICINE

## 2022-07-09 PROCEDURE — 999N000065 XR CHEST PORT 1 VIEW: Mod: TC

## 2022-07-09 PROCEDURE — 80053 COMPREHEN METABOLIC PANEL: CPT | Performed by: INTERNAL MEDICINE

## 2022-07-09 PROCEDURE — 250N000011 HC RX IP 250 OP 636: Performed by: FAMILY MEDICINE

## 2022-07-09 PROCEDURE — 83735 ASSAY OF MAGNESIUM: CPT | Performed by: FAMILY MEDICINE

## 2022-07-09 PROCEDURE — 999N000157 HC STATISTIC RCP TIME EA 10 MIN

## 2022-07-09 PROCEDURE — 83605 ASSAY OF LACTIC ACID: CPT | Performed by: INTERNAL MEDICINE

## 2022-07-09 PROCEDURE — 999N000253 HC STATISTIC WEANING TRIALS

## 2022-07-09 PROCEDURE — 84484 ASSAY OF TROPONIN QUANT: CPT | Performed by: INTERNAL MEDICINE

## 2022-07-09 PROCEDURE — C9113 INJ PANTOPRAZOLE SODIUM, VIA: HCPCS | Performed by: INTERNAL MEDICINE

## 2022-07-09 PROCEDURE — 94640 AIRWAY INHALATION TREATMENT: CPT | Mod: 76

## 2022-07-09 PROCEDURE — 85027 COMPLETE CBC AUTOMATED: CPT | Performed by: INTERNAL MEDICINE

## 2022-07-09 PROCEDURE — 200N000001 HC R&B ICU

## 2022-07-09 PROCEDURE — 82803 BLOOD GASES ANY COMBINATION: CPT | Performed by: INTERNAL MEDICINE

## 2022-07-09 PROCEDURE — 94640 AIRWAY INHALATION TREATMENT: CPT

## 2022-07-09 RX ORDER — FENTANYL CITRATE 50 UG/ML
25 INJECTION, SOLUTION INTRAMUSCULAR; INTRAVENOUS
Status: DISCONTINUED | OUTPATIENT
Start: 2022-07-09 | End: 2022-07-09 | Stop reason: DRUGHIGH

## 2022-07-09 RX ORDER — FUROSEMIDE 10 MG/ML
40 INJECTION INTRAMUSCULAR; INTRAVENOUS ONCE
Status: COMPLETED | OUTPATIENT
Start: 2022-07-09 | End: 2022-07-09

## 2022-07-09 RX ADMIN — Medication 25 MCG/HR: at 16:59

## 2022-07-09 RX ADMIN — THIAMINE HCL TAB 100 MG 300 MG: 100 TAB at 10:30

## 2022-07-09 RX ADMIN — Medication 0.04 MCG/KG/MIN: at 03:32

## 2022-07-09 RX ADMIN — IPRATROPIUM BROMIDE AND ALBUTEROL SULFATE 3 ML: 2.5; .5 SOLUTION RESPIRATORY (INHALATION) at 18:21

## 2022-07-09 RX ADMIN — TAZOBACTAM SODIUM AND PIPERACILLIN SODIUM 4.5 G: 500; 4 INJECTION, SOLUTION INTRAVENOUS at 10:30

## 2022-07-09 RX ADMIN — HYDRALAZINE HYDROCHLORIDE 20 MG: 20 INJECTION INTRAMUSCULAR; INTRAVENOUS at 12:25

## 2022-07-09 RX ADMIN — NYSTATIN: 100000 POWDER TOPICAL at 22:11

## 2022-07-09 RX ADMIN — PROPOFOL 65 MCG/KG/MIN: 10 INJECTION, EMULSION INTRAVENOUS at 00:56

## 2022-07-09 RX ADMIN — NYSTATIN: 100000 POWDER TOPICAL at 10:34

## 2022-07-09 RX ADMIN — HEPARIN SODIUM 1450 UNITS/HR: 10000 INJECTION, SOLUTION INTRAVENOUS at 22:53

## 2022-07-09 RX ADMIN — Medication 100 MCG/HR: at 02:49

## 2022-07-09 RX ADMIN — TAZOBACTAM SODIUM AND PIPERACILLIN SODIUM 4.5 G: 500; 4 INJECTION, SOLUTION INTRAVENOUS at 15:52

## 2022-07-09 RX ADMIN — PROPOFOL 60 MCG/KG/MIN: 10 INJECTION, EMULSION INTRAVENOUS at 07:07

## 2022-07-09 RX ADMIN — PROPOFOL 60 MCG/KG/MIN: 10 INJECTION, EMULSION INTRAVENOUS at 03:48

## 2022-07-09 RX ADMIN — TAZOBACTAM SODIUM AND PIPERACILLIN SODIUM 4.5 G: 500; 4 INJECTION, SOLUTION INTRAVENOUS at 22:09

## 2022-07-09 RX ADMIN — IPRATROPIUM BROMIDE AND ALBUTEROL SULFATE 3 ML: 2.5; .5 SOLUTION RESPIRATORY (INHALATION) at 06:19

## 2022-07-09 RX ADMIN — SODIUM CHLORIDE: 9 INJECTION, SOLUTION INTRAVENOUS at 10:21

## 2022-07-09 RX ADMIN — SODIUM BICARBONATE 50 ML/HR: 84 INJECTION, SOLUTION INTRAVENOUS at 13:45

## 2022-07-09 RX ADMIN — METHYLPREDNISOLONE SODIUM SUCCINATE 40 MG: 40 INJECTION, POWDER, FOR SOLUTION INTRAMUSCULAR; INTRAVENOUS at 22:04

## 2022-07-09 RX ADMIN — Medication 1 TABLET: at 10:31

## 2022-07-09 RX ADMIN — FUROSEMIDE 40 MG: 10 INJECTION, SOLUTION INTRAVENOUS at 08:01

## 2022-07-09 RX ADMIN — FOLIC ACID 1 MG: 1 TABLET ORAL at 10:31

## 2022-07-09 RX ADMIN — PROPOFOL 30 MCG/KG/MIN: 10 INJECTION, EMULSION INTRAVENOUS at 10:50

## 2022-07-09 RX ADMIN — IPRATROPIUM BROMIDE AND ALBUTEROL SULFATE 3 ML: 2.5; .5 SOLUTION RESPIRATORY (INHALATION) at 11:14

## 2022-07-09 RX ADMIN — METHYLPREDNISOLONE SODIUM SUCCINATE 40 MG: 40 INJECTION, POWDER, FOR SOLUTION INTRAMUSCULAR; INTRAVENOUS at 10:30

## 2022-07-09 RX ADMIN — PANTOPRAZOLE SODIUM 40 MG: 40 INJECTION, POWDER, FOR SOLUTION INTRAVENOUS at 10:31

## 2022-07-09 RX ADMIN — FENTANYL CITRATE 25 MCG: 50 INJECTION, SOLUTION INTRAMUSCULAR; INTRAVENOUS at 12:50

## 2022-07-09 RX ADMIN — IPRATROPIUM BROMIDE AND ALBUTEROL SULFATE 3 ML: 2.5; .5 SOLUTION RESPIRATORY (INHALATION) at 15:37

## 2022-07-09 RX ADMIN — HEPARIN SODIUM 1450 UNITS/HR: 10000 INJECTION, SOLUTION INTRAVENOUS at 06:33

## 2022-07-09 RX ADMIN — TAZOBACTAM SODIUM AND PIPERACILLIN SODIUM 4.5 G: 500; 4 INJECTION, SOLUTION INTRAVENOUS at 03:50

## 2022-07-09 ASSESSMENT — ACTIVITIES OF DAILY LIVING (ADL)
ADLS_ACUITY_SCORE: 50

## 2022-07-09 NOTE — PROGRESS NOTES
TELE:  Consulted by Dr. De Oliveira for non-gap metabolic acidosis.  Lactate ok.  Appears to be mixed acidosis: 7.21/50/88.  --Increased vent rate 20->22.  --Initiated bicarb drip at 50/hr, q6hr abg's.  Dr. De Oliveira and his team have agreed to follow these up and will stop bicarb drip once pH reaches 7.30 or higher.  --Apparently pt was started on TPN without trialing tube feeds.  Agree with plan to stop TPN and try tube feeds.    Discussed with in-house provider Dr. De Oliveira.  My recommendations here are based on the information provided over the phone by this patient's in-person providers and chart review. They are not intended to replace or supercede the clinical judgment of the patient's in-person providers.

## 2022-07-09 NOTE — PROGRESS NOTES
Attempted to place pt on wean trail. Pt still too sedated at this time and not responding to commands. Transitioned to SIMV, due to pt dyssynchrony to vent and breath stacking. RN will continue weaning pt sedation medications. Once pt is more alert and taking breaths on his own, I will attempt a CPAP wean trial. Kelly Amos RRT

## 2022-07-09 NOTE — PLAN OF CARE
"  Problem: Plan of Care - These are the overarching goals to be used throughout the patient stay.    Goal: Plan of Care Review/Shift Note  Description: The Plan of Care Review/Shift note should be completed every shift.  The Outcome Evaluation is a brief statement about your assessment that the patient is improving, declining, or no change.  This information will be displayed automatically on your shift note.  Outcome: Ongoing, Progressing  Flowsheets (Taken 7/9/2022 0950)  Overall Patient Progress: no change  Goal: Patient-Specific Goal (Individualized)  Description: You can add care plan individualizations to a care plan. Examples of Individualization might be:  \"Parent requests to be called daily at 9am for status\", \"I have a hard time hearing out of my right ear\", or \"Do not touch me to wake me up as it startles me\".  Outcome: Ongoing, Progressing  Goal: Absence of Hospital-Acquired Illness or Injury  Outcome: Ongoing, Progressing  Intervention: Prevent Skin Injury  Recent Flowsheet Documentation  Taken 7/9/2022 0800 by Belem Carlisle RN  Body Position:   turned   right   side-lying 30 degrees  Goal: Optimal Comfort and Wellbeing  Outcome: Ongoing, Progressing  Intervention: Provide Person-Centered Care  Recent Flowsheet Documentation  Taken 7/9/2022 0800 by Belem Carlisle RN  Trust Relationship/Rapport: care explained  Goal: Readiness for Transition of Care  Outcome: Ongoing, Progressing     Problem: Fall Injury Risk  Goal: Absence of Fall and Fall-Related Injury  Outcome: Ongoing, Progressing     Problem: Behavioral Health Comorbidity  Goal: Maintenance of Behavioral Health Symptom Control  Outcome: Ongoing, Progressing     Problem: Alcohol Withdrawal  Goal: Alcohol Withdrawal Symptom Control  Outcome: Ongoing, Progressing     Problem: Oral Intake Inadequate  Goal: Optimal Oral Intake  Outcome: Ongoing, Progressing     Problem: Restraint, Nonbehavioral (Nonviolent)  Goal: Absence of Harm or Injury  Outcome: " Ongoing, Progressing  Intervention: Protect Dignity, Rights, and Personal Wellbeing  Recent Flowsheet Documentation  Taken 7/9/2022 0800 by Belem Carlisle RN  Trust Relationship/Rapport: care explained  Intervention: Protect Skin and Joint Integrity  Recent Flowsheet Documentation  Taken 7/9/2022 0800 by Belem Carlisle, RN  Body Position:   turned   right   side-lying 30 degrees   Goal Outcome Evaluation:          Overall Patient Progress: no change

## 2022-07-09 NOTE — PLAN OF CARE
"  Problem: Plan of Care - These are the overarching goals to be used throughout the patient stay.    Goal: Plan of Care Review/Shift Note  Description: The Plan of Care Review/Shift note should be completed every shift.  The Outcome Evaluation is a brief statement about your assessment that the patient is improving, declining, or no change.  This information will be displayed automatically on your shift note.  Outcome: Ongoing, Not Progressing  Flowsheets (Taken 7/9/2022 0611)  Overall Patient Progress: no change  Goal: Patient-Specific Goal (Individualized)  Description: You can add care plan individualizations to a care plan. Examples of Individualization might be:  \"Parent requests to be called daily at 9am for status\", \"I have a hard time hearing out of my right ear\", or \"Do not touch me to wake me up as it startles me\".  Outcome: Ongoing, Not Progressing  Goal: Absence of Hospital-Acquired Illness or Injury  Outcome: Ongoing, Not Progressing  Intervention: Identify and Manage Fall Risk  Recent Flowsheet Documentation  Taken 7/9/2022 0400 by Anya Storm RN  Safety Promotion/Fall Prevention: safety round/check completed  Taken 7/9/2022 0000 by Anya Storm RN  Safety Promotion/Fall Prevention: safety round/check completed  Taken 7/8/2022 1915 by Anya Storm RN  Safety Promotion/Fall Prevention: safety round/check completed  Intervention: Prevent Skin Injury  Recent Flowsheet Documentation  Taken 7/9/2022 0400 by Anya Storm RN  Body Position:   turned   right   side-lying 30 degrees  Taken 7/9/2022 0200 by Anya Storm RN  Body Position:   turned   left  Taken 7/9/2022 0000 by Anya Storm RN  Body Position:   turned   right   side-lying 30 degrees  Taken 7/8/2022 2202 by Anya Storm RN  Body Position:   turned   left  Taken 7/8/2022 2000 by Anya Storm RN  Body Position:   turned   right   side-lying 30 degrees   heels elevated  Taken 7/8/2022 1915 by Anya Storm RN  Body " Position: position maintained  Intervention: Prevent and Manage VTE (Venous Thromboembolism) Risk  Recent Flowsheet Documentation  Taken 7/9/2022 0400 by Anya Storm RN  Activity Management: bedrest  Taken 7/9/2022 0000 by Anya Storm RN  Activity Management: bedrest  Taken 7/8/2022 1915 by Anya Storm RN  Activity Management: bedrest  Intervention: Prevent Infection  Recent Flowsheet Documentation  Taken 7/9/2022 0400 by Anya Storm RN  Infection Prevention: hand hygiene promoted  Taken 7/9/2022 0000 by Anya Storm RN  Infection Prevention: hand hygiene promoted  Taken 7/8/2022 1915 by Anya Storm RN  Infection Prevention: hand hygiene promoted  Goal: Optimal Comfort and Wellbeing  Outcome: Ongoing, Not Progressing  Goal: Readiness for Transition of Care  Outcome: Ongoing, Not Progressing     Problem: Oral Intake Inadequate  Goal: Optimal Oral Intake  Outcome: Ongoing, Not Progressing   Goal Outcome Evaluation:          Overall Patient Progress: no change

## 2022-07-09 NOTE — PROGRESS NOTES
Patient remains intubated and sedated. Escobedo in place and patent with adequate urine output. TPN running, blood sugars controlled with insulin drip. Heparin running at 1450. No nonverbal signs of pain. Will continue to monitor.

## 2022-07-09 NOTE — PROGRESS NOTES
Shift Summary: Pt weaned this afternoon and tolerated well. ABG was drawn, showing improvement. Placed pt on new MD orders of AC 22/500/5/30%. Plan to try weaning trail again in the AM. Kelly Amos RRT

## 2022-07-09 NOTE — PROGRESS NOTES
After repositioning patient OG landmark noted to have changed from 55 to 40, advanced OG back to 55cm landmark. Tele ICU updated and order for xray for placement conformation received.

## 2022-07-09 NOTE — PROGRESS NOTES
Pt was placed on wean CPAP5 PS8. Pt tolerated the first 5 minutes well, see flow sheet. Will continue wean through 1700 ABG and then placed back on ALONZO. Kelly Amos RRT

## 2022-07-09 NOTE — PROGRESS NOTES
DATE:  7/9/2022   TIME OF RECEIPT FROM LAB:  DATE:  7/9/2022   TIME OF RECEIPT FROM LAB:  0814   LAB TEST: ABG   LAB VALUE:  Arterial pH 7.17  RESULTS GIVEN WITH READ-BACK TO (PROVIDER):  Sameer De Oliveira MD, message left

## 2022-07-09 NOTE — PROGRESS NOTES
[facility]    Medicine Progress Note - Hospitalist Service    Date of Admission:  6/17/2022    Assessment & Plan   Summary of care    -Admitted 6/17 with alcohol withdrawal, acute kidney injury. -Encephalopathy worsened, agitation worsened and transferred to ICU for closer monitoring on 6/19.  - 6/21 worsening respiratory status, agitation. CT chest showed pulmonary embolism. Intubated due to mental status/somnolence.  -Extubated 6/24  -Improved renal function, transitioned from lovenox to xarelto. Physical and occupational therapy consulted and moved to medical floor. Echo showed arotic valve echodensity that will need ELLEN in future. Blood cultures negative. Slow daily improvement in mobility and mental status through July 4.   -More somnolent on 7/5, and became unresponsive. Code Blue called, CPR initiated. ROSC at 5 minutes. Intubated and transferred to ICU. Possible aspiration pneumonia as cause.   -Troponin on 7/7 was 327, increased to 2000, now decrased. Echo shows no wall motion abnormalities. Unclear of this was due to CPR, or was an event to cause the 7/5 code blue.   -7/9 persistent nonanion gap metabolic acidosis. Lactate normal, BG controlled. Doubt perfusion, ischemia, DKA. Suspect TPN related.      Active Problems:    Acute respiratory failure  Assessment: recurrent, likely due to aspiration. Troponin elevation delayed enough to not thing cardiac arrest was responsible for CODE BLUE.  Plan: continue vent today, wean sedation and trial of SIMV.     Aspiration pneumonia   Assessment: not present on admission   Plan: day 4 zosyn, stopped vancomycin after 48 hrs.     Metabolic encephalopathy  Assessment: multifactorial. Appreciate Dr. Le input. Unfortunately we do not have capability to perform MRI here on ventilated patients, per our radiology department. Noncontrast CT head done yesterday and no bleed.   Plan: sedated at this time    Troponin elevation  Assessment: differential  diagnosis includes non-ST elevation MI, resuscitation related trauma, strain, worsening pulmonary embolism. Troponin peaked.  Echo shows good EF and no wall motion abnormalities.   Plan: heparin drip given creatinine bump today.      Tobacco dependence      Alcohol withdrawal (H)    Assessment: it has been nearly a month since last drink        ATN (acute tubular necrosis) (H)      CHARLOTTE (acute kidney injury) (H)    Assessment: secondary to ATN. Good urine output.     Plan: monitor daily      Pulmonary embolism (H)    Assessment: diagnosed on 6/21 and anticoagulated since then    Plan: continue heparin      Pressure injury of buttock, stage 1    Assessment: present on admission     Plan: wound care, monitor      Aortic valve mass    Assessment: noted on echo. blood culture negative 6/22. 6/27, 7/5. Anticoagulated.  Will need ELLEN in future    Severe protein calorie malutrition  Assessment: started TPN, lipids on 7/6. Given metabolic acidosis, stopped today.    Nonanion gap metabolic acidosis  Assessment: persistent over 72 hrs. Lactate normal, BG controlled. Doubt perfusion, ischemia, DKA. Suspect TPN related.  Plan: stop TPN, repeat ABG at noon. If worsening, start bicarb drip.       Diet: Minced & Moist Diet (level 5) Thin Liquids (level 0)    DVT Prophylaxis: heparin drip   Escobedo Catheter: PRESENT, indication: Strict 1-2 Hour I&O, Strict 1-2 Hour I&O  Central Lines: PRESENT  CVC TRIPLE LUMEN Left Internal jugular Power injectable-Site Assessment: WDL  Cardiac Monitoring: None  Code Status: Full Code      The patient's care was discussed with the Patient's Family and Primary team.    Sameer De Oliveira MD  Hospitalist Service  [facility]  Securely message with the Vocera Web Console (learn more here)  Text page via [facility] Paging/Directory       Interval History   Sedated.    Data reviewed today: I reviewed all medications, new labs and imaging results over the last 24 hours. I personally reviewed  the chest x-ray image(s) showing no worsening infiltrate.    Physical Exam   Vital Signs: Temp: (!) 96  F (35.6  C) Temp src: Temporal BP: 103/55 Pulse: 64   Resp: 22 SpO2: 96 % O2 Device: Mechanical Ventilator    Weight: 208 lbs 1.83 oz  GENERAL: sedated  HEENT: Anicteric, non-injected sclera, mouth moist.   NECK: No JVD.  CARDIOVASCULAR: regular rate and rhythm, no murmur. No lower extremity edema   RESPIRATORY: Clear to auscultation bilaterally, no wheezes, no crackles.  GI: Non-distended, normal bowel sounds, soft, non-tender.  SKIN: stage 2 ulcer at coccyx and left ischial tuberosity. Dressing in place  NEUROLOGY: gag present      Data   Recent Labs   Lab 07/09/22  0842 07/09/22  0720 07/09/22  0607 07/09/22  0522 07/08/22  1509 07/08/22  1508 07/08/22  0544 07/08/22  0542 07/08/22  0443 07/08/22  0439 07/07/22  1047 07/07/22  0531   WBC  --   --   --  19.6*  --   --   --  15.3*  --   --   --  16.0*   HGB  --   --   --  11.0*  --   --   --  10.3*  --   --   --  10.4*   MCV  --   --   --  106*  --   --   --  106*  --   --   --  107*   PLT  --   --   --  205  --   --   --  167  --   --   --  171   INR  --   --   --   --   --   --   --   --   --   --   --  1.24*   NA  --   --   --  138  --   --   --   --   --  142  --  136   POTASSIUM  --   --   --  4.9  --  4.6  --   --   --  3.0*  --  4.3   CHLORIDE  --   --   --  110*  --   --   --   --   --  111*  --  106   CO2  --   --   --  18*  --   --   --   --   --  21  --  23   BUN  --   --   --  42*  --   --   --   --   --  33*  --  32*   CR  --   --   --  1.66*  --   --   --   --   --  1.41*  --  1.61*   ANIONGAP  --   --   --  10  --   --   --   --   --  10  --  7   PAWAN  --   --   --  8.0*  --   --   --   --   --  8.3*  --  8.0*   * 156* 157* 187*   < >  --    < >  --    < > 221*   < > 263*   ALBUMIN  --   --   --  2.4*  --   --   --   --   --   --   --  2.5*   PROTTOTAL  --   --   --  5.0*  --   --   --   --   --   --   --  5.3*   BILITOTAL  --   --   --  0.4   --   --   --   --   --   --   --  0.6   ALKPHOS  --   --   --  49  --   --   --   --   --   --   --  41   ALT  --   --   --  31  --   --   --   --   --   --   --  44   AST  --   --   --  25  --   --   --   --   --   --   --  28    < > = values in this interval not displayed.     Recent Results (from the past 24 hour(s))   US Lower Extremity Venous Duplex Bilateral    Narrative    Exam:US LOWER EXTREMITY VENOUS DUPLEX BILATERAL    History:  72 years Male   : Shortness of breath, elevated troponin.    Comparisons:    Technique: Venous duplex ultrasonography of the bilateral lower  extremities was performed.     Findings: The common femoral veins, superficial femoral veins and  popliteal veins are fully compressible with spontaneous and  augmentable venous flow.           Impression    Impression: No evidence of deep venous thrombosis within the lower  extremities.    ASH FOX MD         SYSTEM ID:  [id number]   CT Head w/o contrast*    Narrative    PROCEDURE: CT HEAD W/O CONTRAST     HISTORY: acute mental status changes.    COMPARISON: June 24, 2022    TECHNIQUE:  Helical images of the head from the foramen magnum to the  vertex were obtained without contrast.    FINDINGS: There is an old lacunar infarct seen in the head of the  caudate and the anterior limb of the internal capsule on the left.  There is an old lacunar infarct seen in the basal ganglia and anterior  limb of the internal capsule on the right. There are no masses or  ventricular shifts or extracerebral collections. The brainstem and  cerebellum appear normal. The cranial vault is intact. There is a  polyp or retention cyst seen in the right maxillary sinus.      Impression    IMPRESSION: No acute brain abnormality. Old lacunar infarcts seen in  both hemispheres      DAT CEE MD         SYSTEM ID:  [id number]   XR Chest Port 1 View    Narrative    PROCEDURE INFORMATION:   Exam: XR Chest   Exam date and time: 7/9/2022 7:06 AM   Age: 72 years  old   Clinical indication: Device placement; Other: Og placement; Patient HX: Og tube   was pulled out slightly and had to be re-advanced. Placement check after   re-advancement.     TECHNIQUE:   Imaging protocol: Radiologic exam of the chest.   Views: 1 view.     COMPARISON:   CR XR CHEST PORT 1 VIEW 7/7/2022 11:51 AM     FINDINGS:   Tubes, catheters and devices: There is an endotracheal tube terminating about 7   cm above the chela in this projection. The enteric tube terminates distal to   the GE junction.     Lungs: Improving interstitial markings without dense consolidation.   Pleural spaces: Unremarkable. No pleural effusion. No pneumothorax.   Heart/Mediastinum: Stable cardiomediastinal silhouette. Bilateral paratracheal   thickening is likely vascular stable.   Bones/joints: Degenerative changes without acute fracture.       Impression    IMPRESSION:   Support tubes as above.  No focal airspace disease.    THIS DOCUMENT HAS BEEN ELECTRONICALLY SIGNED BY WOODROW NAVA MD     Medications     dextrose       dextrose       fentanyl 50 mcg/hr (07/09/22 0747)     heparin 1,450 Units/hr (07/09/22 0708)     norepinephrine 0.03 mcg/kg/min (07/09/22 1008)     propofol (DIPRIVAN) infusion 35 mcg/kg/min (07/09/22 1000)     sodium chloride 50 mL/hr at 07/07/22 2328     sodium chloride 10 mL/hr at 07/04/22 2131       folic acid  1 mg Oral Daily     ipratropium - albuterol 0.5 mg/2.5 mg/3 mL  3 mL Nebulization 4x daily     [Held by provider] lisinopril  20 mg Oral Daily     methylPREDNISolone  40 mg Intravenous Q12H    Followed by     [START ON 7/10/2022] methylPREDNISolone  40 mg Intravenous Q24H     [Held by provider] metoprolol tartrate  25 mg Oral BID     multivitamin w/minerals  1 tablet Oral Daily     nystatin   Topical BID     pantoprazole (PROTONIX) IV  40 mg Intravenous Daily     piperacillin-tazobactam  4.5 g Intravenous Q6H     [Held by provider] QUEtiapine  100 mg Oral At Bedtime     sodium chloride (PF)   10-40 mL Intracatheter Q8H     sodium chloride (PF)  3 mL Intracatheter Q8H     thiamine  300 mg Oral Daily

## 2022-07-10 ENCOUNTER — APPOINTMENT (OUTPATIENT)
Dept: GENERAL RADIOLOGY | Facility: OTHER | Age: 72
DRG: 673 | End: 2022-07-10
Attending: INTERNAL MEDICINE
Payer: MEDICARE

## 2022-07-10 LAB
ALBUMIN SERPL-MCNC: 2.3 G/DL (ref 3.5–5.7)
ALP SERPL-CCNC: 60 U/L (ref 34–104)
ALT SERPL W P-5'-P-CCNC: 31 U/L (ref 7–52)
ANION GAP SERPL CALCULATED.3IONS-SCNC: 12 MMOL/L (ref 3–14)
ANION GAP SERPL CALCULATED.3IONS-SCNC: 13 MMOL/L (ref 3–14)
APTT PPP: 76 SECONDS (ref 22–38)
APTT PPP: 80 SECONDS (ref 22–38)
AST SERPL W P-5'-P-CCNC: 32 U/L (ref 13–39)
BACTERIA BLD CULT: NO GROWTH
BACTERIA BLD CULT: NO GROWTH
BASE EXCESS BLDA CALC-SCNC: -0.9 MMOL/L (ref -9–1.8)
BASE EXCESS BLDA CALC-SCNC: 0.3 MMOL/L (ref -9–1.8)
BASE EXCESS BLDA CALC-SCNC: 2.5 MMOL/L (ref -9–1.8)
BASE EXCESS BLDA CALC-SCNC: NORMAL MMOL/L
BILIRUB SERPL-MCNC: 0.5 MG/DL (ref 0.3–1)
BUN SERPL-MCNC: 55 MG/DL (ref 7–25)
BUN SERPL-MCNC: 64 MG/DL (ref 7–25)
CALCIUM SERPL-MCNC: 7.7 MG/DL (ref 8.6–10.3)
CALCIUM SERPL-MCNC: 8.4 MG/DL (ref 8.6–10.3)
CHLORIDE BLD-SCNC: 105 MMOL/L (ref 98–107)
CHLORIDE BLD-SCNC: 105 MMOL/L (ref 98–107)
CO2 SERPL-SCNC: 22 MMOL/L (ref 21–31)
CO2 SERPL-SCNC: 23 MMOL/L (ref 21–31)
CREAT SERPL-MCNC: 2.24 MG/DL (ref 0.7–1.3)
CREAT SERPL-MCNC: 2.41 MG/DL (ref 0.7–1.3)
ERYTHROCYTE [DISTWIDTH] IN BLOOD BY AUTOMATED COUNT: 14.4 % (ref 10–15)
GFR SERPL CREATININE-BSD FRML MDRD: 27 ML/MIN/1.73M2
GFR SERPL CREATININE-BSD FRML MDRD: 30 ML/MIN/1.73M2
GLUCOSE BLD-MCNC: 113 MG/DL (ref 70–105)
GLUCOSE BLD-MCNC: 140 MG/DL (ref 70–105)
GLUCOSE BLDC GLUCOMTR-MCNC: 103 MG/DL (ref 70–99)
GLUCOSE BLDC GLUCOMTR-MCNC: 104 MG/DL (ref 70–99)
GLUCOSE BLDC GLUCOMTR-MCNC: 122 MG/DL (ref 70–99)
GLUCOSE BLDC GLUCOMTR-MCNC: 126 MG/DL (ref 70–99)
GLUCOSE BLDC GLUCOMTR-MCNC: 138 MG/DL (ref 70–99)
HCO3 BLD-SCNC: 24 MMOL/L (ref 21–28)
HCO3 BLD-SCNC: 24 MMOL/L (ref 21–28)
HCO3 BLD-SCNC: 26 MMOL/L (ref 21–28)
HCO3 BLD-SCNC: NORMAL MMOL/L
HCT VFR BLD AUTO: 31.3 % (ref 40–53)
HGB BLD-MCNC: 10.3 G/DL (ref 13.3–17.7)
LACTATE SERPL-SCNC: 1.6 MMOL/L (ref 0.7–2)
MCH RBC QN AUTO: 33.2 PG (ref 26.5–33)
MCHC RBC AUTO-ENTMCNC: 32.9 G/DL (ref 31.5–36.5)
MCV RBC AUTO: 101 FL (ref 78–100)
O2/TOTAL GAS SETTING VFR VENT: 25 %
O2/TOTAL GAS SETTING VFR VENT: 30 %
OXYHGB MFR BLD: 96 % (ref 92–100)
PCO2 BLD: 34 MM HG (ref 35–45)
PCO2 BLD: 37 MM HG (ref 35–45)
PCO2 BLD: 41 MM HG (ref 35–45)
PCO2 BLD: NORMAL MM[HG]
PH BLD: 7.38 [PH] (ref 7.35–7.45)
PH BLD: 7.43 [PH] (ref 7.35–7.45)
PH BLD: 7.48 [PH] (ref 7.35–7.45)
PH BLD: NORMAL [PH]
PLATELET # BLD AUTO: 205 10E3/UL (ref 150–450)
PO2 BLD: 71 MM HG (ref 80–105)
PO2 BLD: 77 MM HG (ref 80–105)
PO2 BLD: 79 MM HG (ref 80–105)
PO2 BLD: NORMAL MM[HG]
POTASSIUM BLD-SCNC: 4.9 MMOL/L (ref 3.5–5.1)
POTASSIUM BLD-SCNC: 5.5 MMOL/L (ref 3.5–5.1)
PROT SERPL-MCNC: 4.8 G/DL (ref 6.4–8.9)
RBC # BLD AUTO: 3.1 10E6/UL (ref 4.4–5.9)
SODIUM SERPL-SCNC: 140 MMOL/L (ref 134–144)
SODIUM SERPL-SCNC: 140 MMOL/L (ref 134–144)
TROPONIN I SERPL-MCNC: 2885.9 PG/ML (ref 0–34)
WBC # BLD AUTO: 15.8 10E3/UL (ref 4–11)

## 2022-07-10 PROCEDURE — 250N000011 HC RX IP 250 OP 636: Performed by: INTERNAL MEDICINE

## 2022-07-10 PROCEDURE — 3E0G76Z INTRODUCTION OF NUTRITIONAL SUBSTANCE INTO UPPER GI, VIA NATURAL OR ARTIFICIAL OPENING: ICD-10-PCS | Performed by: INTERNAL MEDICINE

## 2022-07-10 PROCEDURE — 250N000011 HC RX IP 250 OP 636: Performed by: FAMILY MEDICINE

## 2022-07-10 PROCEDURE — 83605 ASSAY OF LACTIC ACID: CPT | Performed by: INTERNAL MEDICINE

## 2022-07-10 PROCEDURE — 71045 X-RAY EXAM CHEST 1 VIEW: CPT | Mod: TC

## 2022-07-10 PROCEDURE — 36600 WITHDRAWAL OF ARTERIAL BLOOD: CPT | Performed by: INTERNAL MEDICINE

## 2022-07-10 PROCEDURE — 250N000013 HC RX MED GY IP 250 OP 250 PS 637: Performed by: INTERNAL MEDICINE

## 2022-07-10 PROCEDURE — 84484 ASSAY OF TROPONIN QUANT: CPT | Performed by: INTERNAL MEDICINE

## 2022-07-10 PROCEDURE — 999N000253 HC STATISTIC WEANING TRIALS

## 2022-07-10 PROCEDURE — 250N000009 HC RX 250: Performed by: INTERNAL MEDICINE

## 2022-07-10 PROCEDURE — 99232 SBSQ HOSP IP/OBS MODERATE 35: CPT | Performed by: INTERNAL MEDICINE

## 2022-07-10 PROCEDURE — 250N000013 HC RX MED GY IP 250 OP 250 PS 637: Performed by: FAMILY MEDICINE

## 2022-07-10 PROCEDURE — 85027 COMPLETE CBC AUTOMATED: CPT | Performed by: INTERNAL MEDICINE

## 2022-07-10 PROCEDURE — 36415 COLL VENOUS BLD VENIPUNCTURE: CPT | Performed by: INTERNAL MEDICINE

## 2022-07-10 PROCEDURE — 999N000157 HC STATISTIC RCP TIME EA 10 MIN

## 2022-07-10 PROCEDURE — 94640 AIRWAY INHALATION TREATMENT: CPT | Mod: 76

## 2022-07-10 PROCEDURE — 94640 AIRWAY INHALATION TREATMENT: CPT

## 2022-07-10 PROCEDURE — 85730 THROMBOPLASTIN TIME PARTIAL: CPT | Performed by: INTERNAL MEDICINE

## 2022-07-10 PROCEDURE — C9113 INJ PANTOPRAZOLE SODIUM, VIA: HCPCS | Performed by: INTERNAL MEDICINE

## 2022-07-10 PROCEDURE — 82803 BLOOD GASES ANY COMBINATION: CPT | Performed by: INTERNAL MEDICINE

## 2022-07-10 PROCEDURE — 80053 COMPREHEN METABOLIC PANEL: CPT | Performed by: INTERNAL MEDICINE

## 2022-07-10 PROCEDURE — 200N000001 HC R&B ICU

## 2022-07-10 PROCEDURE — 82805 BLOOD GASES W/O2 SATURATION: CPT | Performed by: INTERNAL MEDICINE

## 2022-07-10 PROCEDURE — 94003 VENT MGMT INPAT SUBQ DAY: CPT

## 2022-07-10 RX ORDER — DEXTROSE MONOHYDRATE 100 MG/ML
INJECTION, SOLUTION INTRAVENOUS CONTINUOUS PRN
Status: DISCONTINUED | OUTPATIENT
Start: 2022-07-10 | End: 2022-07-12 | Stop reason: HOSPADM

## 2022-07-10 RX ORDER — GUAIFENESIN 600 MG/1
15 TABLET, EXTENDED RELEASE ORAL DAILY
Status: DISCONTINUED | OUTPATIENT
Start: 2022-07-10 | End: 2022-07-12 | Stop reason: HOSPADM

## 2022-07-10 RX ORDER — CEFTRIAXONE SODIUM 2 G/50ML
2 INJECTION, SOLUTION INTRAVENOUS EVERY 24 HOURS
Status: DISCONTINUED | OUTPATIENT
Start: 2022-07-10 | End: 2022-07-12 | Stop reason: HOSPADM

## 2022-07-10 RX ORDER — ASCORBIC ACID 500 MG
500 TABLET ORAL DAILY
Status: DISCONTINUED | OUTPATIENT
Start: 2022-07-10 | End: 2022-07-12 | Stop reason: HOSPADM

## 2022-07-10 RX ORDER — ACETAMINOPHEN 500 MG
1000 TABLET ORAL EVERY 6 HOURS PRN
Status: DISCONTINUED | OUTPATIENT
Start: 2022-07-10 | End: 2022-07-12 | Stop reason: HOSPADM

## 2022-07-10 RX ORDER — FOLIC ACID 1 MG/1
1 TABLET ORAL DAILY
Status: DISCONTINUED | OUTPATIENT
Start: 2022-07-11 | End: 2022-07-12 | Stop reason: HOSPADM

## 2022-07-10 RX ORDER — METRONIDAZOLE 500 MG/100ML
500 INJECTION, SOLUTION INTRAVENOUS EVERY 8 HOURS
Status: DISCONTINUED | OUTPATIENT
Start: 2022-07-10 | End: 2022-07-12 | Stop reason: HOSPADM

## 2022-07-10 RX ORDER — FUROSEMIDE 10 MG/ML
40 INJECTION INTRAMUSCULAR; INTRAVENOUS ONCE
Status: COMPLETED | OUTPATIENT
Start: 2022-07-10 | End: 2022-07-10

## 2022-07-10 RX ADMIN — IPRATROPIUM BROMIDE AND ALBUTEROL SULFATE 3 ML: 2.5; .5 SOLUTION RESPIRATORY (INHALATION) at 14:00

## 2022-07-10 RX ADMIN — Medication 25 MCG/HR: at 07:37

## 2022-07-10 RX ADMIN — TAZOBACTAM SODIUM AND PIPERACILLIN SODIUM 3.38 G: 375; 3 INJECTION, SOLUTION INTRAVENOUS at 09:30

## 2022-07-10 RX ADMIN — OXYCODONE HYDROCHLORIDE AND ACETAMINOPHEN 500 MG: 500 TABLET ORAL at 10:49

## 2022-07-10 RX ADMIN — PROPOFOL 25 MCG/KG/MIN: 10 INJECTION, EMULSION INTRAVENOUS at 01:27

## 2022-07-10 RX ADMIN — Medication 25 MCG: at 15:44

## 2022-07-10 RX ADMIN — IPRATROPIUM BROMIDE AND ALBUTEROL SULFATE 3 ML: 2.5; .5 SOLUTION RESPIRATORY (INHALATION) at 10:05

## 2022-07-10 RX ADMIN — COLLAGENASE SANTYL: 250 OINTMENT TOPICAL at 09:31

## 2022-07-10 RX ADMIN — FOLIC ACID 1 MG: 1 TABLET ORAL at 09:29

## 2022-07-10 RX ADMIN — TAZOBACTAM SODIUM AND PIPERACILLIN SODIUM 4.5 G: 500; 4 INJECTION, SOLUTION INTRAVENOUS at 04:31

## 2022-07-10 RX ADMIN — MULTIVIT AND MINERALS-FERROUS GLUCONATE 9 MG IRON/15 ML ORAL LIQUID 15 ML: at 11:20

## 2022-07-10 RX ADMIN — NYSTATIN: 100000 POWDER TOPICAL at 22:08

## 2022-07-10 RX ADMIN — CEFTRIAXONE SODIUM 2 G: 2 INJECTION, SOLUTION INTRAVENOUS at 10:41

## 2022-07-10 RX ADMIN — METHYLPREDNISOLONE SODIUM SUCCINATE 40 MG: 40 INJECTION, POWDER, FOR SOLUTION INTRAMUSCULAR; INTRAVENOUS at 09:29

## 2022-07-10 RX ADMIN — METRONIDAZOLE 500 MG: 500 INJECTION, SOLUTION INTRAVENOUS at 10:41

## 2022-07-10 RX ADMIN — IPRATROPIUM BROMIDE AND ALBUTEROL SULFATE 3 ML: 2.5; .5 SOLUTION RESPIRATORY (INHALATION) at 06:09

## 2022-07-10 RX ADMIN — Medication 25 MCG: at 22:26

## 2022-07-10 RX ADMIN — Medication 25 MCG: at 17:18

## 2022-07-10 RX ADMIN — NYSTATIN: 100000 POWDER TOPICAL at 09:31

## 2022-07-10 RX ADMIN — THIAMINE HCL TAB 100 MG 300 MG: 100 TAB at 09:29

## 2022-07-10 RX ADMIN — FUROSEMIDE 40 MG: 10 INJECTION, SOLUTION INTRAVENOUS at 09:29

## 2022-07-10 RX ADMIN — HYDRALAZINE HYDROCHLORIDE 20 MG: 20 INJECTION INTRAMUSCULAR; INTRAVENOUS at 06:13

## 2022-07-10 RX ADMIN — HYDRALAZINE HYDROCHLORIDE 20 MG: 20 INJECTION INTRAMUSCULAR; INTRAVENOUS at 11:21

## 2022-07-10 RX ADMIN — PANTOPRAZOLE SODIUM 40 MG: 40 INJECTION, POWDER, FOR SOLUTION INTRAVENOUS at 09:29

## 2022-07-10 RX ADMIN — IPRATROPIUM BROMIDE AND ALBUTEROL SULFATE 3 ML: 2.5; .5 SOLUTION RESPIRATORY (INHALATION) at 18:09

## 2022-07-10 RX ADMIN — Medication 1 TABLET: at 09:29

## 2022-07-10 RX ADMIN — METRONIDAZOLE 500 MG: 500 INJECTION, SOLUTION INTRAVENOUS at 18:41

## 2022-07-10 RX ADMIN — HEPARIN SODIUM 1450 UNITS/HR: 10000 INJECTION, SOLUTION INTRAVENOUS at 14:11

## 2022-07-10 ASSESSMENT — ACTIVITIES OF DAILY LIVING (ADL)
ADLS_ACUITY_SCORE: 50
ADLS_ACUITY_SCORE: 54
ADLS_ACUITY_SCORE: 50
ADLS_ACUITY_SCORE: 50
ADLS_ACUITY_SCORE: 54
ADLS_ACUITY_SCORE: 50

## 2022-07-10 NOTE — PROGRESS NOTES
Remains intubated, propofol and fentanyl used for sedation. Escobedo remains in place and patent with good urine output throughout the shift. No nonverbal signs of pain. Tachycardic through most of the shift. Repositioned patient every 2 hours. Oral care provided. Bicarb drip d/kaden. Will continue to monitor.

## 2022-07-10 NOTE — PHARMACY
Pharmacy- Renal Dose Adjustment    Patient Active Problem List   Diagnosis     Hyperlipidemia     Obesity     Right leg weakness     Tobacco dependence     Urinary frequency     H. pylori infection     Alcohol withdrawal (H)     ATN (acute tubular necrosis) (H)     Dehydration     CHARLOTTE (acute kidney injury) (H)     Pulmonary embolism (H)     Pressure injury of buttock, stage 1     Aortic valve mass     Acute kidney failure, unspecified (H)        Relevant Labs:  Recent Labs   Lab Test 07/10/22  0524 07/09/22  0522   WBC 15.8* 19.6*   HGB 10.3* 11.0*    205        CrCl: 33.4 mL/min      Intake/Output Summary (Last 24 hours) at 7/10/2022 0910  Last data filed at 7/10/2022 0737  Gross per 24 hour   Intake 1629.41 ml   Output 2700 ml   Net -1070.59 ml          Per Renal Dose Adjustment Protocol, will adjust:  -Pip/tazo to 3.375 g Q6H (CrCl 20-40 mL/min, indication of aspiration pneumonia, Wt > 90 kg)      Will continue to follow and make adjustments accordingly. Thank You.    Kuldeep Hanson [facility] ....................  7/10/2022   9:10 AM

## 2022-07-10 NOTE — PLAN OF CARE
"  Problem: Plan of Care - These are the overarching goals to be used throughout the patient stay.    Goal: Plan of Care Review/Shift Note  Description: The Plan of Care Review/Shift note should be completed every shift.  The Outcome Evaluation is a brief statement about your assessment that the patient is improving, declining, or no change.  This information will be displayed automatically on your shift note.  Outcome: Ongoing, Progressing  Flowsheets (Taken 7/10/2022 1407)  Plan of Care Reviewed With: patient  Overall Patient Progress: improving  Goal: Patient-Specific Goal (Individualized)  Description: You can add care plan individualizations to a care plan. Examples of Individualization might be:  \"Parent requests to be called daily at 9am for status\", \"I have a hard time hearing out of my right ear\", or \"Do not touch me to wake me up as it startles me\".  Outcome: Ongoing, Progressing  Goal: Absence of Hospital-Acquired Illness or Injury  Outcome: Ongoing, Progressing  Intervention: Prevent Skin Injury  Recent Flowsheet Documentation  Taken 7/10/2022 1200 by Belem Carlisle RN  Body Position:   turned   right   side-lying 30 degrees  Taken 7/10/2022 1000 by Belem Carlisle RN  Body Position:   turned   left   side-lying 30 degrees  Taken 7/10/2022 0800 by Belem Carlisle RN  Body Position:   turned   supine, legs elevated  Intervention: Prevent and Manage VTE (Venous Thromboembolism) Risk  Recent Flowsheet Documentation  Taken 7/10/2022 1000 by Belem Carlisle RN  Activity Management: bedrest  Goal: Optimal Comfort and Wellbeing  Outcome: Ongoing, Progressing  Intervention: Provide Person-Centered Care  Recent Flowsheet Documentation  Taken 7/10/2022 1200 by Belem Carlisle RN  Trust Relationship/Rapport: care explained  Taken 7/10/2022 0700 by Belem Carlisle RN  Trust Relationship/Rapport: care explained  Goal: Readiness for Transition of Care  Outcome: Ongoing, Progressing     Problem: Fall Injury Risk  Goal: " Absence of Fall and Fall-Related Injury  Outcome: Ongoing, Progressing     Problem: Behavioral Health Comorbidity  Goal: Maintenance of Behavioral Health Symptom Control  Outcome: Ongoing, Progressing     Problem: Alcohol Withdrawal  Goal: Alcohol Withdrawal Symptom Control  Outcome: Ongoing, Progressing     Problem: Oral Intake Inadequate  Goal: Optimal Oral Intake  Outcome: Ongoing, Progressing     Problem: Restraint, Nonbehavioral (Nonviolent)  Goal: Absence of Harm or Injury  Outcome: Ongoing, Progressing  Intervention: Protect Dignity, Rights, and Personal Wellbeing  Recent Flowsheet Documentation  Taken 7/10/2022 1200 by Belem Carlisle RN  Trust Relationship/Rapport: care explained  Taken 7/10/2022 0700 by Belem Carlisle RN  Trust Relationship/Rapport: care explained  Intervention: Protect Skin and Joint Integrity  Recent Flowsheet Documentation  Taken 7/10/2022 1200 by Belem Carlisle RN  Body Position:   turned   right   side-lying 30 degrees  Taken 7/10/2022 1000 by Belem Carlisle RN  Body Position:   turned   left   side-lying 30 degrees  Taken 7/10/2022 0800 by Belem Carlisle RN  Body Position:   turned   supine, legs elevated   Goal Outcome Evaluation:    Plan of Care Reviewed With: patient     Overall Patient Progress: improving

## 2022-07-10 NOTE — PROGRESS NOTES
Called tele ICU in regards to elevated heart rate, no new orders at this time, continue to monitor. Sedation increased due to breathing over the vent.

## 2022-07-10 NOTE — PROGRESS NOTES
Patient remains unable to successfully participate and/or tolerate PT/OT over weekend. PT/OT will remain available to assist nursing staff with patient mobility and ADL performance as needed.

## 2022-07-10 NOTE — PROGRESS NOTES
Spontaneous breathing trial started at 0949. Pt passed his initial 2 minutes. Spontaneous RR is 22, Vt 661, and MV 17.8. Will continue to monitor.    Marisela Mendes, RT

## 2022-07-10 NOTE — PROGRESS NOTES
Pt on full vent support at this time. Pt appears to be well sedated and resting comfortably. No interventions at this time.

## 2022-07-10 NOTE — PLAN OF CARE
"  Problem: Oral Intake Inadequate  Goal: Optimal Oral Intake  Outcome: Ongoing, Not Progressing     Problem: Plan of Care - These are the overarching goals to be used throughout the patient stay.    Goal: Plan of Care Review/Shift Note  Description: The Plan of Care Review/Shift note should be completed every shift.  The Outcome Evaluation is a brief statement about your assessment that the patient is improving, declining, or no change.  This information will be displayed automatically on your shift note.  Outcome: Ongoing, Progressing  Goal: Patient-Specific Goal (Individualized)  Description: You can add care plan individualizations to a care plan. Examples of Individualization might be:  \"Parent requests to be called daily at 9am for status\", \"I have a hard time hearing out of my right ear\", or \"Do not touch me to wake me up as it startles me\".  Outcome: Ongoing, Progressing  Goal: Absence of Hospital-Acquired Illness or Injury  Outcome: Ongoing, Progressing  Intervention: Identify and Manage Fall Risk  Recent Flowsheet Documentation  Taken 7/10/2022 0400 by Anya Storm RN  Safety Promotion/Fall Prevention: safety round/check completed  Taken 7/10/2022 0000 by Anya Storm RN  Safety Promotion/Fall Prevention: safety round/check completed  Taken 7/9/2022 1930 by Anya Storm RN  Safety Promotion/Fall Prevention: safety round/check completed  Intervention: Prevent Skin Injury  Recent Flowsheet Documentation  Taken 7/10/2022 0400 by Anya Storm RN  Body Position:   turned   left   side-lying 30 degrees  Taken 7/10/2022 0200 by Anya Storm RN  Body Position:   right   turned   side-lying 30 degrees   heels elevated  Taken 7/10/2022 0000 by Anya Storm RN  Body Position:   turned   left   side-lying 30 degrees  Taken 7/9/2022 2300 by Anya Storm RN  Body Position:   turned   right   side-lying 30 degrees  Taken 7/9/2022 2000 by Anya Storm RN  Body Position:   turned   left   side-lying " 30 degrees  Taken 7/9/2022 1930 by Anya Storm RN  Body Position: position maintained  Intervention: Prevent and Manage VTE (Venous Thromboembolism) Risk  Recent Flowsheet Documentation  Taken 7/10/2022 0400 by Anya Storm RN  Activity Management: bedrest  Taken 7/10/2022 0000 by Anya Storm RN  Activity Management: bedrest  Taken 7/9/2022 1930 by Anya Storm RN  Activity Management: bedrest  Goal: Optimal Comfort and Wellbeing  Outcome: Ongoing, Progressing  Goal: Readiness for Transition of Care  Outcome: Ongoing, Progressing     Problem: Fall Injury Risk  Goal: Absence of Fall and Fall-Related Injury  Outcome: Ongoing, Progressing  Intervention: Identify and Manage Contributors  Recent Flowsheet Documentation  Taken 7/10/2022 0400 by Anya Storm RN  Medication Review/Management: medications reviewed  Taken 7/10/2022 0000 by Anya Storm RN  Medication Review/Management: medications reviewed  Taken 7/9/2022 1930 by Anya Storm RN  Medication Review/Management: medications reviewed  Intervention: Promote Injury-Free Environment  Recent Flowsheet Documentation  Taken 7/10/2022 0400 by Anya Storm RN  Safety Promotion/Fall Prevention: safety round/check completed  Taken 7/10/2022 0000 by Anya Storm RN  Safety Promotion/Fall Prevention: safety round/check completed  Taken 7/9/2022 1930 by Anya Storm RN  Safety Promotion/Fall Prevention: safety round/check completed     Problem: Behavioral Health Comorbidity  Goal: Maintenance of Behavioral Health Symptom Control  Outcome: Ongoing, Progressing  Intervention: Maintain Behavioral Health Symptom Control  Recent Flowsheet Documentation  Taken 7/10/2022 0400 by Anya Storm RN  Medication Review/Management: medications reviewed  Taken 7/10/2022 0000 by Anya Storm RN  Medication Review/Management: medications reviewed  Taken 7/9/2022 1930 by Anya Storm RN  Medication Review/Management: medications reviewed     Problem:  Alcohol Withdrawal  Goal: Alcohol Withdrawal Symptom Control  Outcome: Ongoing, Progressing     Problem: Restraint, Nonbehavioral (Nonviolent)  Goal: Absence of Harm or Injury  Outcome: Ongoing, Progressing  Intervention: Implement Least Restrictive Safety Strategies  Recent Flowsheet Documentation  Taken 7/10/2022 0400 by Anya Storm RN  Medical Device Protection: tubing secured  Taken 7/10/2022 0000 by Anya Storm RN  Medical Device Protection: tubing secured  Taken 7/9/2022 1930 by Anya Storm RN  Medical Device Protection: tubing secured  Intervention: Protect Skin and Joint Integrity  Recent Flowsheet Documentation  Taken 7/10/2022 0400 by Anya Storm RN  Body Position:   turned   left   side-lying 30 degrees  Taken 7/10/2022 0200 by Anya Storm RN  Body Position:   right   turned   side-lying 30 degrees   heels elevated  Taken 7/10/2022 0000 by Anya Storm RN  Body Position:   turned   left   side-lying 30 degrees  Taken 7/9/2022 2300 by Anya Storm RN  Body Position:   turned   right   side-lying 30 degrees  Taken 7/9/2022 2000 by Anya Storm RN  Body Position:   turned   left   side-lying 30 degrees  Taken 7/9/2022 1930 by Anay Storm RN  Body Position: position maintained   Goal Outcome Evaluation:          Overall Patient Progress: no change

## 2022-07-10 NOTE — PROGRESS NOTES
Md notified patient gums are bleeding, looks like tube position on the gum line and that Per RT there has been intermittent dark red blood obtained during inline suctioning this afternoon.  PTT ordered, RT notified. RT informed RN blood obtained during oral suctioning not inline suctioning.

## 2022-07-10 NOTE — PROGRESS NOTES
[facility]    Medicine Progress Note - Hospitalist Service    Date of Admission:  6/17/2022    Assessment & Plan          Summary of care    -Admitted 6/17 with alcohol withdrawal, acute kidney injury. -Encephalopathy worsened, agitation worsened and transferred to ICU for closer monitoring on 6/19.  - 6/21 worsening respiratory status, agitation. CT chest showed pulmonary embolism. Intubated due to mental status/somnolence.  -Extubated 6/24  -Improved renal function, transitioned from lovenox to xarelto. Physical and occupational therapy consulted and moved to medical floor. Echo showed arotic valve echodensity that will need ELLEN in future. Blood cultures negative. Slow daily improvement in mobility and mental status through July 4.   -More somnolent on 7/5, and became unresponsive. Code Blue called, CPR initiated. ROSC at 5 minutes. Intubated and transferred to ICU. Possible aspiration pneumonia as cause.   -Troponin on 7/7 was 327, increased to 2000, now decrased. Echo shows no wall motion abnormalities. Unclear of this was due to CPR, or was an event to cause the 7/5 code blue.   -7/9 persistent nonanion gap metabolic acidosis. Lactate normal, BG controlled. Doubt perfusion, ischemia, DKA. Suspect TPN related.  -7/10 ABG improved. Metabolic acidosis resolved. Spontaneously breathing but not awake enough to protect airway at this time. Creatinine increased, with troponin increase.       Active Problems:    Acute respiratory failure  Assessment: recurrent, likely due to aspiration. Troponin elevation delayed enough to not thing cardiac arrest was responsible for CODE BLUE.  Plan: spontaneous breathing trial. Once awake enough to protect airway, plan extubation.     Aspiration pneumonia   Assessment: not present on admission   Plan: stop zosyn due to acute kidney injury, stopped vancomycin after 48 hrs. Start flagyl and ceftriaxone.    Metabolic encephalopathy  Assessment: multifactorial.  Appreciate Dr. Le input. Unfortunately we do not have capability to perform MRI here on ventilated patients, per our radiology department. Noncontrast CT head done yesterday and no bleed.   Plan: await return of neurologic function off sedation.     Troponin elevation  Assessment:  Troponin increased.  Echo from 7/8 shows good EF and no wall motion abnormalities. CPR/trauma related with acute kidney injury vs ACS.   Plan: continue heparin drip given creatinine bump today. Recheck troponin in AM.       Tobacco dependence      Alcohol withdrawal (H)    Assessment: it has been nearly a month since last drink        ATN (acute tubular necrosis) (H)      CHARLOTTE (acute kidney injury) (H)    Assessment: secondary to ATN. Good urine output. Climbing creatinine - possibly related to zosyn.    Plan: monitor daily, switch antibiotics today.      Pulmonary embolism (H)    Assessment: diagnosed on 6/21 and anticoagulated since then    Plan: continue heparin      Pressure injury of buttock, stage 1    Assessment: present on admission     Plan: wound care, monitor      Aortic valve mass    Assessment: noted on echo. blood culture negative 6/22. 6/27, 7/5. Anticoagulated.  Will need ELLEN in future    Severe protein calorie malutrition  Assessment: started TPN, lipids on 7/6. Given metabolic acidosis, stopped on 7/9.  Plan: start tube feeds today.     Nonanion gap metabolic acidosis  Assessment: persistent over 72 hrs. Lactate normal, BG controlled. Doubt perfusion, ischemia, DKA. Suspect TPN related. Resolved with bicarb drip.  Plan: monitor daily         Diet: Minced & Moist Diet (level 5) Thin Liquids (level 0)    DVT Prophylaxis: heparin  Escobedo Catheter: PRESENT, indication: Strict 1-2 Hour I&O, Strict 1-2 Hour I&O  Central Lines: PRESENT  CVC TRIPLE LUMEN Left Internal jugular Power injectable-Site Assessment: WDL  Cardiac Monitoring: None  Code Status: Full Code       The patient's care was discussed with the  Patient.    Sameer De Oliveira MD  Hospitalist Service  [facility]  Securely message with the [employer] Web Console (learn more here)  Text page via [employer] Paging/Directory         Clinically Significant Risk Factors Present on Admission                      ______________________________________________________________________    Interval History   Off sedation. Eyes open but not responsive.     Data reviewed today: I reviewed all medications, new labs and imaging results over the last 24 hours. I personally reviewed the chest x-ray image(s) showing no effusion.    Physical Exam   Vital Signs: Temp: 99.1  F (37.3  C) Temp src: Temporal BP: (!) 151/71 Pulse: 101   Resp: 25 SpO2: 97 % O2 Device: Mechanical Ventilator    Weight: 208 lbs 1.83 oz  GENERAL: Eyes open  HEENT: Anicteric, non-injected sclera, mouth moist.   NECK: No JVD.  CARDIOVASCULAR: regular rate and rhythm, no murmur. 1+ lower extremity edema   RESPIRATORY: Clear to auscultation bilaterally, no wheezes, no crackles.  GI: Non-distended, normal bowel sounds, soft, non-tender.  SKIN: Stage 2 coccyx ulcer and right buttock ulcer.   NEUROLOGY: Pupils react, eyes open, gag present. No purposeful movement.       Data   Recent Labs   Lab 07/10/22  0526 07/10/22  0524 07/10/22  0122 07/09/22  0607 07/09/22  0522 07/08/22  1509 07/08/22  1508 07/08/22  0544 07/08/22  0542 07/08/22  0443 07/08/22  0439 07/07/22  1047 07/07/22  0531   WBC  --  15.8*  --   --  19.6*  --   --   --  15.3*  --   --   --  16.0*   HGB  --  10.3*  --   --  11.0*  --   --   --  10.3*  --   --   --  10.4*   MCV  --  101*  --   --  106*  --   --   --  106*  --   --   --  107*   PLT  --  205  --   --  205  --   --   --  167  --   --   --  171   INR  --   --   --   --   --   --   --   --   --   --   --   --  1.24*   NA  --  140  --   --  138  --   --   --   --   --  142  --  136   POTASSIUM  --  5.5*  --   --  4.9  --  4.6  --   --   --  3.0*  --  4.3   CHLORIDE  --  105  --    --  110*  --   --   --   --   --  111*  --  106   CO2  --  23  --   --  18*  --   --   --   --   --  21  --  23   BUN  --  55*  --   --  42*  --   --   --   --   --  33*  --  32*   CR  --  2.24*  --   --  1.66*  --   --   --   --   --  1.41*  --  1.61*   ANIONGAP  --  12  --   --  10  --   --   --   --   --  10  --  7   PAWAN  --  7.7*  --   --  8.0*  --   --   --   --   --  8.3*  --  8.0*   * 113* 103*   < > 187*   < >  --    < >  --    < > 221*   < > 263*   ALBUMIN  --  2.3*  --   --  2.4*  --   --   --   --   --   --   --  2.5*   PROTTOTAL  --  4.8*  --   --  5.0*  --   --   --   --   --   --   --  5.3*   BILITOTAL  --  0.5  --   --  0.4  --   --   --   --   --   --   --  0.6   ALKPHOS  --  60  --   --  49  --   --   --   --   --   --   --  41   ALT  --  31  --   --  31  --   --   --   --   --   --   --  44   AST  --  32  --   --  25  --   --   --   --   --   --   --  28    < > = values in this interval not displayed.     Arterial Blood Gases:    Recent Labs   Lab 07/10/22  0524 07/09/22  2250 07/09/22  1658 07/09/22  1213   PH 7.38 7.36 7.30* 7.21*   PCO2 41 40 40 50*   PO2 77* 78* 78* 88   HCO3 24 23 20* 20*   O2PER 30  30 30 30 30         Recent Results (from the past 24 hour(s))   XR Chest Port 1 View    Narrative    PROCEDURE INFORMATION:   Exam: XR Chest   Exam date and time: 7/10/2022 8:06 AM   Age: 72 years old   Clinical indication: Device placement; Other: Daily cxr check for intubation;   Additional info: Intubated     TECHNIQUE:   Imaging protocol: Radiologic exam of the chest.   Views: 1 view.     COMPARISON:   CR XR CHEST PORT 1 VIEW 7/9/2022 7:06 AM     FINDINGS:   Tubes, catheters and devices: ETT 6 cm above the chela. Apparent NG tube, not   clearly visualized below the thoracic inlet.     Lungs: Unremarkable. No consolidation.   Pleural spaces: Unremarkable. No pneumothorax.   Heart/Mediastinum: Unremarkable.    Bones/joints: Unremarkable.       Impression    IMPRESSION:   ETT 6 cm above  the chela. Apparent NG tube, not clearly visualized below the   thoracic inlet.     THIS DOCUMENT HAS BEEN ELECTRONICALLY SIGNED BY GARRETT GONZALEZ MD     Medications     dextrose       dextrose       fentanyl Stopped (07/10/22 0932)     heparin 1,450 Units/hr (07/10/22 0655)     norepinephrine Stopped (07/09/22 1136)     propofol (DIPRIVAN) infusion Stopped (07/10/22 0904)     sodium chloride 10 mL/hr at 07/04/22 2131       cefTRIAXone  2 g Intravenous Q24H     collagenase   Topical Daily     folic acid  1 mg Oral Daily     ipratropium - albuterol 0.5 mg/2.5 mg/3 mL  3 mL Nebulization 4x daily     [Held by provider] lisinopril  20 mg Oral Daily     methylPREDNISolone  40 mg Intravenous Q24H     [Held by provider] metoprolol tartrate  25 mg Oral BID     metroNIDAZOLE  500 mg Intravenous Q8H     multivitamin w/minerals  1 tablet Oral Daily     multivitamins w/minerals  15 mL Per Feeding Tube Daily     nystatin   Topical BID     pantoprazole (PROTONIX) IV  40 mg Intravenous Daily     [Held by provider] QUEtiapine  100 mg Oral At Bedtime     sodium chloride (PF)  10-40 mL Intracatheter Q8H     sodium chloride (PF)  3 mL Intracatheter Q8H     thiamine  300 mg Oral Daily     vitamin C  500 mg Per Feeding Tube Daily

## 2022-07-10 NOTE — PROGRESS NOTES
Tube Feedings began at 1345 at a rate of 10 ml/hr of Jevity 1.5 with goal being 30 ml/hr. Will work towards goal if patient tolerates.  Patient is currently resting comfortably on wean trial off of all sedation. Patient is currently unresponsive.

## 2022-07-11 ENCOUNTER — APPOINTMENT (OUTPATIENT)
Dept: GENERAL RADIOLOGY | Facility: OTHER | Age: 72
DRG: 673 | End: 2022-07-11
Attending: INTERNAL MEDICINE
Payer: MEDICARE

## 2022-07-11 LAB
ALBUMIN SERPL-MCNC: 2.6 G/DL (ref 3.5–5.7)
ALP SERPL-CCNC: 62 U/L (ref 34–104)
ALT SERPL W P-5'-P-CCNC: 35 U/L (ref 7–52)
ANION GAP SERPL CALCULATED.3IONS-SCNC: 12 MMOL/L (ref 3–14)
APTT PPP: 73 SECONDS (ref 22–38)
AST SERPL W P-5'-P-CCNC: 38 U/L (ref 13–39)
BACTERIA BLD CULT: NO GROWTH
BACTERIA BLD CULT: NO GROWTH
BASE EXCESS BLDA CALC-SCNC: 1.7 MMOL/L (ref -9–1.8)
BASE EXCESS BLDA CALC-SCNC: 1.8 MMOL/L (ref -9–1.8)
BASE EXCESS BLDA CALC-SCNC: 1.8 MMOL/L (ref -9–1.8)
BILIRUB SERPL-MCNC: 0.5 MG/DL (ref 0.3–1)
BUN SERPL-MCNC: 65 MG/DL (ref 7–25)
C DIFF TOX B STL QL: NEGATIVE
CALCIUM SERPL-MCNC: 8.6 MG/DL (ref 8.6–10.3)
CHLORIDE BLD-SCNC: 107 MMOL/L (ref 98–107)
CO2 SERPL-SCNC: 24 MMOL/L (ref 21–31)
CREAT SERPL-MCNC: 2.55 MG/DL (ref 0.7–1.3)
ERYTHROCYTE [DISTWIDTH] IN BLOOD BY AUTOMATED COUNT: 14.6 % (ref 10–15)
GFR SERPL CREATININE-BSD FRML MDRD: 26 ML/MIN/1.73M2
GLUCOSE BLD-MCNC: 116 MG/DL (ref 70–105)
GLUCOSE BLDC GLUCOMTR-MCNC: 111 MG/DL (ref 70–99)
GLUCOSE BLDC GLUCOMTR-MCNC: 117 MG/DL (ref 70–99)
GLUCOSE BLDC GLUCOMTR-MCNC: 123 MG/DL (ref 70–99)
GLUCOSE BLDC GLUCOMTR-MCNC: 157 MG/DL (ref 70–99)
GLUCOSE BLDC GLUCOMTR-MCNC: 184 MG/DL (ref 70–99)
GLUCOSE BLDC GLUCOMTR-MCNC: 198 MG/DL (ref 70–99)
HCO3 BLD-SCNC: 25 MMOL/L (ref 21–28)
HCO3 BLD-SCNC: 25 MMOL/L (ref 21–28)
HCO3 BLD-SCNC: 26 MMOL/L (ref 21–28)
HCT VFR BLD AUTO: 33.6 % (ref 40–53)
HGB BLD-MCNC: 11.4 G/DL (ref 13.3–17.7)
INR PPP: 1.06 (ref 0.85–1.15)
LACTATE SERPL-SCNC: 0.7 MMOL/L (ref 0.7–2)
MAGNESIUM SERPL-MCNC: 2.4 MG/DL (ref 1.9–2.7)
MCH RBC QN AUTO: 33.7 PG (ref 26.5–33)
MCHC RBC AUTO-ENTMCNC: 33.9 G/DL (ref 31.5–36.5)
MCV RBC AUTO: 99 FL (ref 78–100)
O2/TOTAL GAS SETTING VFR VENT: 25 %
O2/TOTAL GAS SETTING VFR VENT: 25 %
O2/TOTAL GAS SETTING VFR VENT: 60 %
PCO2 BLD: 35 MM HG (ref 35–45)
PCO2 BLD: 35 MM HG (ref 35–45)
PCO2 BLD: 38 MM HG (ref 35–45)
PH BLD: 7.45 [PH] (ref 7.35–7.45)
PH BLD: 7.47 [PH] (ref 7.35–7.45)
PH BLD: 7.47 [PH] (ref 7.35–7.45)
PHOSPHATE SERPL-MCNC: 4.3 MG/DL (ref 2.5–5)
PLATELET # BLD AUTO: 227 10E3/UL (ref 150–450)
PO2 BLD: 250 MM HG (ref 80–105)
PO2 BLD: 65 MM HG (ref 80–105)
PO2 BLD: 73 MM HG (ref 80–105)
POTASSIUM BLD-SCNC: 4.2 MMOL/L (ref 3.5–5.1)
PREALB SERPL IA-MCNC: 18 MG/DL (ref 15–45)
PROT SERPL-MCNC: 6.1 G/DL (ref 6.4–8.9)
RBC # BLD AUTO: 3.38 10E6/UL (ref 4.4–5.9)
RIBOTYPE 027/NAP1/BI: NORMAL
SODIUM SERPL-SCNC: 143 MMOL/L (ref 134–144)
TROPONIN I SERPL-MCNC: 2759.4 PG/ML (ref 0–34)
WBC # BLD AUTO: 14.7 10E3/UL (ref 4–11)

## 2022-07-11 PROCEDURE — 250N000011 HC RX IP 250 OP 636: Performed by: INTERNAL MEDICINE

## 2022-07-11 PROCEDURE — 94003 VENT MGMT INPAT SUBQ DAY: CPT

## 2022-07-11 PROCEDURE — 83735 ASSAY OF MAGNESIUM: CPT | Performed by: FAMILY MEDICINE

## 2022-07-11 PROCEDURE — 87493 C DIFF AMPLIFIED PROBE: CPT | Performed by: FAMILY MEDICINE

## 2022-07-11 PROCEDURE — 250N000013 HC RX MED GY IP 250 OP 250 PS 637: Performed by: FAMILY MEDICINE

## 2022-07-11 PROCEDURE — 84484 ASSAY OF TROPONIN QUANT: CPT | Performed by: INTERNAL MEDICINE

## 2022-07-11 PROCEDURE — 94640 AIRWAY INHALATION TREATMENT: CPT

## 2022-07-11 PROCEDURE — 250N000009 HC RX 250: Performed by: INTERNAL MEDICINE

## 2022-07-11 PROCEDURE — 99232 SBSQ HOSP IP/OBS MODERATE 35: CPT | Performed by: FAMILY MEDICINE

## 2022-07-11 PROCEDURE — 36600 WITHDRAWAL OF ARTERIAL BLOOD: CPT | Performed by: INTERNAL MEDICINE

## 2022-07-11 PROCEDURE — 71045 X-RAY EXAM CHEST 1 VIEW: CPT | Mod: TC

## 2022-07-11 PROCEDURE — 250N000011 HC RX IP 250 OP 636: Performed by: FAMILY MEDICINE

## 2022-07-11 PROCEDURE — 85610 PROTHROMBIN TIME: CPT | Performed by: FAMILY MEDICINE

## 2022-07-11 PROCEDURE — 84134 ASSAY OF PREALBUMIN: CPT | Performed by: FAMILY MEDICINE

## 2022-07-11 PROCEDURE — 99222 1ST HOSP IP/OBS MODERATE 55: CPT | Mod: 25 | Performed by: SURGERY

## 2022-07-11 PROCEDURE — 84100 ASSAY OF PHOSPHORUS: CPT | Performed by: FAMILY MEDICINE

## 2022-07-11 PROCEDURE — 250N000009 HC RX 250: Performed by: FAMILY MEDICINE

## 2022-07-11 PROCEDURE — 94640 AIRWAY INHALATION TREATMENT: CPT | Mod: 76

## 2022-07-11 PROCEDURE — 250N000013 HC RX MED GY IP 250 OP 250 PS 637: Performed by: INTERNAL MEDICINE

## 2022-07-11 PROCEDURE — 82803 BLOOD GASES ANY COMBINATION: CPT | Performed by: INTERNAL MEDICINE

## 2022-07-11 PROCEDURE — C9113 INJ PANTOPRAZOLE SODIUM, VIA: HCPCS | Performed by: INTERNAL MEDICINE

## 2022-07-11 PROCEDURE — 36415 COLL VENOUS BLD VENIPUNCTURE: CPT | Performed by: FAMILY MEDICINE

## 2022-07-11 PROCEDURE — 999N000157 HC STATISTIC RCP TIME EA 10 MIN

## 2022-07-11 PROCEDURE — 83605 ASSAY OF LACTIC ACID: CPT | Performed by: INTERNAL MEDICINE

## 2022-07-11 PROCEDURE — 80053 COMPREHEN METABOLIC PANEL: CPT | Performed by: FAMILY MEDICINE

## 2022-07-11 PROCEDURE — 0JB70ZZ EXCISION OF BACK SUBCUTANEOUS TISSUE AND FASCIA, OPEN APPROACH: ICD-10-PCS | Performed by: SURGERY

## 2022-07-11 PROCEDURE — 85027 COMPLETE CBC AUTOMATED: CPT | Performed by: INTERNAL MEDICINE

## 2022-07-11 PROCEDURE — 200N000001 HC R&B ICU

## 2022-07-11 PROCEDURE — 85730 THROMBOPLASTIN TIME PARTIAL: CPT | Performed by: INTERNAL MEDICINE

## 2022-07-11 PROCEDURE — 36415 COLL VENOUS BLD VENIPUNCTURE: CPT | Performed by: INTERNAL MEDICINE

## 2022-07-11 RX ORDER — GINSENG 100 MG
CAPSULE ORAL DAILY
Status: DISCONTINUED | OUTPATIENT
Start: 2022-07-11 | End: 2022-07-12 | Stop reason: HOSPADM

## 2022-07-11 RX ORDER — METOPROLOL TARTRATE 25 MG/1
25 TABLET, FILM COATED ORAL 2 TIMES DAILY
Status: DISCONTINUED | OUTPATIENT
Start: 2022-07-11 | End: 2022-07-12 | Stop reason: HOSPADM

## 2022-07-11 RX ORDER — METOPROLOL TARTRATE 1 MG/ML
5 INJECTION, SOLUTION INTRAVENOUS 3 TIMES DAILY PRN
Status: DISCONTINUED | OUTPATIENT
Start: 2022-07-11 | End: 2022-07-12 | Stop reason: HOSPADM

## 2022-07-11 RX ORDER — LOPERAMIDE HCL 2 MG
2 CAPSULE ORAL 4 TIMES DAILY PRN
Status: DISCONTINUED | OUTPATIENT
Start: 2022-07-11 | End: 2022-07-11

## 2022-07-11 RX ORDER — LOPERAMIDE HCL 2 MG
2 CAPSULE ORAL 4 TIMES DAILY PRN
Status: DISCONTINUED | OUTPATIENT
Start: 2022-07-11 | End: 2022-07-12 | Stop reason: HOSPADM

## 2022-07-11 RX ADMIN — NYSTATIN: 100000 POWDER TOPICAL at 21:41

## 2022-07-11 RX ADMIN — THIAMINE HCL TAB 100 MG 300 MG: 100 TAB at 09:06

## 2022-07-11 RX ADMIN — METOPROLOL TARTRATE 25 MG: 25 TABLET, FILM COATED ORAL at 21:39

## 2022-07-11 RX ADMIN — BACITRACIN: 500 OINTMENT TOPICAL at 16:50

## 2022-07-11 RX ADMIN — COLLAGENASE SANTYL: 250 OINTMENT TOPICAL at 11:07

## 2022-07-11 RX ADMIN — OXYCODONE HYDROCHLORIDE AND ACETAMINOPHEN 500 MG: 500 TABLET ORAL at 09:05

## 2022-07-11 RX ADMIN — IPRATROPIUM BROMIDE AND ALBUTEROL SULFATE 3 ML: 2.5; .5 SOLUTION RESPIRATORY (INHALATION) at 10:08

## 2022-07-11 RX ADMIN — NYSTATIN: 100000 POWDER TOPICAL at 09:06

## 2022-07-11 RX ADMIN — METRONIDAZOLE 500 MG: 500 INJECTION, SOLUTION INTRAVENOUS at 02:45

## 2022-07-11 RX ADMIN — HYDRALAZINE HYDROCHLORIDE 20 MG: 20 INJECTION INTRAMUSCULAR; INTRAVENOUS at 19:48

## 2022-07-11 RX ADMIN — Medication 100 MCG: at 23:55

## 2022-07-11 RX ADMIN — HYDRALAZINE HYDROCHLORIDE 20 MG: 20 INJECTION INTRAMUSCULAR; INTRAVENOUS at 13:52

## 2022-07-11 RX ADMIN — HYDRALAZINE HYDROCHLORIDE 20 MG: 20 INJECTION INTRAMUSCULAR; INTRAVENOUS at 02:40

## 2022-07-11 RX ADMIN — IPRATROPIUM BROMIDE AND ALBUTEROL SULFATE 3 ML: 2.5; .5 SOLUTION RESPIRATORY (INHALATION) at 14:12

## 2022-07-11 RX ADMIN — Medication 50 MCG: at 22:44

## 2022-07-11 RX ADMIN — Medication 25 MCG: at 01:44

## 2022-07-11 RX ADMIN — FOLIC ACID 1 MG: 1 TABLET ORAL at 09:06

## 2022-07-11 RX ADMIN — PANTOPRAZOLE SODIUM 40 MG: 40 INJECTION, POWDER, FOR SOLUTION INTRAVENOUS at 09:05

## 2022-07-11 RX ADMIN — MIDAZOLAM 2 MG: 1 INJECTION INTRAMUSCULAR; INTRAVENOUS at 07:14

## 2022-07-11 RX ADMIN — ACETAMINOPHEN 1000 MG: 500 TABLET ORAL at 09:05

## 2022-07-11 RX ADMIN — IPRATROPIUM BROMIDE AND ALBUTEROL SULFATE 3 ML: 2.5; .5 SOLUTION RESPIRATORY (INHALATION) at 18:17

## 2022-07-11 RX ADMIN — METOPROLOL TARTRATE 5 MG: 5 INJECTION INTRAVENOUS at 11:16

## 2022-07-11 RX ADMIN — METHYLPREDNISOLONE SODIUM SUCCINATE 40 MG: 40 INJECTION, POWDER, FOR SOLUTION INTRAMUSCULAR; INTRAVENOUS at 09:05

## 2022-07-11 RX ADMIN — Medication 50 MCG: at 21:42

## 2022-07-11 RX ADMIN — HYDRALAZINE HYDROCHLORIDE 20 MG: 20 INJECTION INTRAMUSCULAR; INTRAVENOUS at 07:14

## 2022-07-11 RX ADMIN — Medication 25 MCG: at 15:56

## 2022-07-11 RX ADMIN — CEFTRIAXONE SODIUM 2 G: 2 INJECTION, SOLUTION INTRAVENOUS at 11:04

## 2022-07-11 RX ADMIN — IPRATROPIUM BROMIDE AND ALBUTEROL SULFATE 3 ML: 2.5; .5 SOLUTION RESPIRATORY (INHALATION) at 06:09

## 2022-07-11 RX ADMIN — MULTIVIT AND MINERALS-FERROUS GLUCONATE 9 MG IRON/15 ML ORAL LIQUID 15 ML: at 09:26

## 2022-07-11 RX ADMIN — METRONIDAZOLE 500 MG: 500 INJECTION, SOLUTION INTRAVENOUS at 11:22

## 2022-07-11 RX ADMIN — METRONIDAZOLE 500 MG: 500 INJECTION, SOLUTION INTRAVENOUS at 21:39

## 2022-07-11 RX ADMIN — HEPARIN SODIUM 1450 UNITS/HR: 10000 INJECTION, SOLUTION INTRAVENOUS at 07:30

## 2022-07-11 ASSESSMENT — ACTIVITIES OF DAILY LIVING (ADL)
ADLS_ACUITY_SCORE: 50

## 2022-07-11 NOTE — PROGRESS NOTES
Sacral ulcer restaged today  Stage III. Present on admission but at stage 1-2. Has worsened  Surgery consult.   Appreciate recommendations.

## 2022-07-11 NOTE — PROGRESS NOTES
:      will continue to follow for future discharge planning needs.     REECE Rodriguez on 7/11/2022 at 12:42 PM

## 2022-07-11 NOTE — PROGRESS NOTES
Patient remains intubated with no sedation drips running. Received two doses of fentanyl throughout shift. Frequently laying with eyes open. Would not respond or follow commands. Pupils equal and reactive to light. Two large loose stools this shift. Bleeding noted to right side of mouth. Ulcer noted to lower medial mouth. Oral cares provided. Repositioned throughout shift.

## 2022-07-11 NOTE — PROGRESS NOTES
Shift Summary  Pt is still intubated and being mechanically ventilated. Pt sedation was weaned off during beginning of shift. Pt started Spontaneous breathing trial at 0949 with inspiratory pressure of 8 over CPAP of 5 in which the Inspiratory pressure was decreased to 7 around two PM. Pt underwent Spontaneous trial until 1809 per MD. MD did not want to extubate due to continuing need for airway protection. Pt switched back to full vent support for the night with settings of AC-22/500/25%/+5. Some blood has been orally suctioned from mouth, MD and RN notified.    Marisela Mendes, RT

## 2022-07-11 NOTE — PROGRESS NOTES
[facility]    Medicine Progress Note - Hospitalist Service    Date of Admission:  6/17/2022    Assessment & Plan      Summary of care     -Admitted 6/17 with alcohol withdrawal, acute kidney injury. -Encephalopathy worsened, agitation worsened and transferred to ICU for closer monitoring on 6/19.  - 6/21 worsening respiratory status, agitation. CT chest showed pulmonary embolism. Intubated due to mental status/somnolence.  -Extubated 6/24  -Improved renal function, transitioned from lovenox to xarelto. Physical and occupational therapy consulted and moved to medical floor. Echo showed arotic valve echodensity that will need ELLEN in future. Blood cultures negative. Slow daily improvement in mobility and mental status through July 4.   -More somnolent on 7/5, and became unresponsive. Code Blue called, CPR initiated. ROSC at 5 minutes. Intubated and transferred to ICU. Possible aspiration pneumonia as cause.   -Troponin on 7/7 was 327, increased to 2000, now decrased. Echo shows no wall motion abnormalities. Unclear of this was due to CPR, or was an event to cause the 7/5 code blue.   -7/9 persistent nonanion gap metabolic acidosis. Lactate normal, BG controlled. Doubt perfusion, ischemia, DKA. Suspect TPN related.  -7/10 ABG improved. Metabolic acidosis resolved. Spontaneously breathing but not awake enough to protect airway at this time. Creatinine increased, with troponin increase.         Acute respiratory failure  Assessment: recurrent, likely due to aspiration. Troponin elevation delayed enough to not thing cardiac arrest was responsible for CODE BLUE.  Plan: spontaneous breathing trial. Once awake enough to protect airway, plan extubation. this AM only barely able to open eyes and follow voice but unable to squeeze fingers. Continue to monitor today to see if he will be candidate for extubation/wean trial.      Aspiration pneumonia   Assessment: not present on admission   Plan: stop zosyn  due to acute kidney injury, stopped vancomycin after 48 hrs. Continue flagyl and ceftriaxone for one more day of treatment.      Metabolic encephalopathy  Assessment: multifactorial. Appreciate Dr. Le input. Unfortunately we do not have capability to perform MRI here on ventilated patients, per our radiology department. Noncontrast CT head done yesterday and no bleed.   Plan: await return of neurologic function off sedation.      Troponin elevation  Assessment:  Troponin increased.  Echo from 7/8 shows good EF and no wall motion abnormalities. CPR/trauma related with acute kidney injury vs ACS.   Plan: continue heparin drip given creatinine bump today. Recheck troponin in AM.        Tobacco dependence       Alcohol withdrawal (H)    Assessment: it has been nearly a month since last drink        ATN (acute tubular necrosis) (H)       CHARLOTTE (acute kidney injury) (H)    Assessment: secondary to ATN. Good urine output. Climbing creatinine - possibly related to zosyn.    Plan: monitor daily, switch antibiotics today.       Pulmonary embolism (H)    Assessment: diagnosed on 6/21 and anticoagulated since then    Plan: continue heparin       Pressure injury of buttock, stage 1-per RN, area appears worse than on admission. Will consult surgery.     Assessment: present on admission     Plan: wound care, monitor       Aortic valve mass    Assessment: noted on echo. blood culture negative 6/22. 6/27, 7/5. Anticoagulated.  Will need ELLEN in future     Severe protein calorie malutrition  Assessment: started TPN, lipids on 7/6. Given metabolic acidosis, stopped on 7/9.  Plan: started tube feeds by OG on 7/10/22     Nonanion gap metabolic acidosis  Assessment: persistent over 72 hrs. Lactate normal, BG controlled. Doubt perfusion, ischemia, DKA. Suspect TPN related. Resolved with bicarb drip.  Plan: monitor daily    No beds at [facility] for transfer.   On waitlist at [place]     Diet: Minced & Moist Diet (level 5) Thin Liquids  (level 0)  Adult Formula Drip Feeding: Continuous Jevity 1.5; Orogastric tube; Goal Rate: 30; mL/hr; Medication - Feeding Tube Flush Frequency: At least 15-30 mL water before and after medication administration and with tube clogging    DVT Prophylaxis: Heparin SQ  Escobedo Catheter: PRESENT, indication: Strict 1-2 Hour I&O, Strict 1-2 Hour I&O  Central Lines: PRESENT  CVC TRIPLE LUMEN Left Internal jugular Power injectable-Site Assessment: WDL  Cardiac Monitoring: None  Code Status: Full Code      Disposition Plan   Unclear  Several days  No beds available for transfer.     The patient's care was discussed with the Patient and Patient's Family.    Erna Shelton DO  Hospitalist Service  [facility]  Securely message with the [employer] Web Console (learn more here)  Text page via [employer] Paging/Directory         Clinically Significant Risk Factors Present on Admission                      ______________________________________________________________________    Interval History   Off sedation.  Only able to open eyes and will track to voice.  Continues to have good urine output, Escobedo in place.  On a heparin drip.  Off pressors.  Has had some episodes of elevated blood pressure which happened previously when he came off sedation.  Oral meds have been on hold.  We will try to give via OG if compatible.    Data reviewed today: I reviewed all medications, new labs and imaging results over the last 24 hours. I personally reviewed no images or EKG's today.    Physical Exam   Vital Signs: Temp: 98.7  F (37.1  C) Temp src: Tympanic BP: (!) 152/94 Pulse: 96   Resp: 28 SpO2: 97 % O2 Device: Mechanical Ventilator    Weight: 208 lbs 1.83 oz  GENERAL: Eyes open to command but delayed and very weak. Off sedation, on ventilator.   HEENT: Anicteric, non-injected sclera, mouth moist.   NECK: No JVD.  CARDIOVASCULAR: regular rate and rhythm, no murmur. 1+ lower extremity edema   RESPIRATORY: Clear to auscultation  bilaterally, no wheezes, no crackles.  GI: Non-distended, normal bowel sounds, soft, non-tender.  SKIN: Stage 2 coccyx ulcer and right buttock ulcer.   NEUROLOGY: Pupils react, eyes open, gag present. No purposeful movement.     Data   Recent Labs   Lab 07/11/22  0715 07/11/22  0555 07/11/22  0545 07/10/22  2206 07/10/22  1830 07/10/22  0526 07/10/22  0524 07/09/22  0607 07/09/22  0522 07/07/22  1047 07/07/22  0531   WBC  --  14.7*  --   --   --   --  15.8*  --  19.6*   < > 16.0*   HGB  --  11.4*  --   --   --   --  10.3*  --  11.0*   < > 10.4*   MCV  --  99  --   --   --   --  101*  --  106*   < > 107*   PLT  --  227  --   --   --   --  205  --  205   < > 171   INR  --  1.06  --   --   --   --   --   --   --   --  1.24*   NA  --  143  --   --  140  --  140  --  138   < > 136   POTASSIUM  --  4.2  --   --  4.9  --  5.5*  --  4.9   < > 4.3   CHLORIDE  --  107  --   --  105  --  105  --  110*   < > 106   CO2  --  24  --   --  22  --  23  --  18*   < > 23   BUN  --  65*  --   --  64*  --  55*  --  42*   < > 32*   CR  --  2.55*  --   --  2.41*  --  2.24*  --  1.66*   < > 1.61*   ANIONGAP  --  12  --   --  13  --  12  --  10   < > 7   PAWAN  --  8.6  --   --  8.4*  --  7.7*  --  8.0*   < > 8.0*   * 116* 117*   < > 140*   < > 113*   < > 187*   < > 263*   ALBUMIN  --  2.6*  --   --   --   --  2.3*  --  2.4*  --  2.5*   PROTTOTAL  --  6.1*  --   --   --   --  4.8*  --  5.0*  --  5.3*   BILITOTAL  --  0.5  --   --   --   --  0.5  --  0.4  --  0.6   ALKPHOS  --  62  --   --   --   --  60  --  49  --  41   ALT  --  35  --   --   --   --  31  --  31  --  44   AST  --  38  --   --   --   --  32  --  25  --  28    < > = values in this interval not displayed.     No results found for this or any previous visit (from the past 24 hour(s)).

## 2022-07-11 NOTE — CONSULTS
GENERAL SURGERY CONSULTATION NOTE    Antonio Rutherford   [address]  [facility] [address]  72 year old  male  Admission Date/Time: 6/17/2022  4:06 PM  Primary Care Provider:  Brian Rivero was asked to see this patient by  for evaluation of sacral ulcer.     HPI: Antonio is a 72-year-old male has been intubated for about a week now.  He had a cardiac arrest in the hospital.  There attempting extubation.  He is noted to have a worsening decubitus ulcer.    REVIEW OF SYSTEMS:     Unable to obtain      Patient Active Problem List   Diagnosis     Hyperlipidemia     Obesity     Right leg weakness     Tobacco dependence     Urinary frequency     H. pylori infection     Alcohol withdrawal (H)     ATN (acute tubular necrosis) (H)     Dehydration     CHARLOTTE (acute kidney injury) (H)     Pulmonary embolism (H)     Pressure injury of buttock, stage 1     Aortic valve mass     Acute kidney failure, unspecified (H)       Past Medical History:   Diagnosis Date     Alcohol abuse      Tobacco dependence        Past Surgical History:   Procedure Laterality Date     ESOPHAGOSCOPY, GASTROSCOPY, DUODENOSCOPY (EGD), COMBINED N/A 3/20/2019    Procedure: ESOPHAGOSCOPY, GASTROSCOPY, DUODENOSCOPY (EGD), Biuopsies and Dilation of Esophagus;  Surgeon: Izzy Cancino MD;  Location:  OR     ESOPHAGOSCOPY, GASTROSCOPY, DUODENOSCOPY (EGD), COMBINED N/A 6/5/2019    Procedure: ESOPHAGOGASTRODUODENOSCOPY, WITH BIOPSY;  Surgeon: Izzy Cancino MD;  Location:  OR     NO HISTORY OF SURGERY         Family History   Problem Relation Age of Onset     Other - See Comments Sister         b12 deficiency     Dysphagia Sister        Social History     Social History Narrative    Has worked as a , now retired.       Social History     Socioeconomic History     Marital status:      Spouse name: Not on file     Number of children: Not on file     Years of education: Not on file     Highest education level: Not on file  "  Occupational History     Not on file   Tobacco Use     Smoking status: Current Every Day Smoker     Packs/day: 1.50     Years: 62.00     Pack years: 93.00     Types: Cigarettes     Smokeless tobacco: Never Used     Tobacco comment: has cut back his smoking   Substance and Sexual Activity     Alcohol use: Yes     Comment: Alcoholic Drinks/day: \"way too much\" 1 L per week     Drug use: Not on file     Comment: no drug use     Sexual activity: Not on file   Other Topics Concern     Not on file   Social History Narrative    Has worked as a , now retired.     Social Determinants of Health     Financial Resource Strain: Not on file   Food Insecurity: Not on file   Transportation Needs: Not on file   Physical Activity: Not on file   Stress: Not on file   Social Connections: Not on file   Intimate Partner Violence: Not on file   Housing Stability: Not on file       No current facility-administered medications on file prior to encounter.  dicyclomine (BENTYL) 20 MG tablet, Take 20 mg by mouth 2 times daily as needed  ibuprofen (ADVIL/MOTRIN) 400 MG tablet, Take 400 mg by mouth 2 times daily as needed for moderate pain  lisinopril (ZESTRIL) 20 MG tablet, Take 20 mg by mouth daily  omeprazole 20 MG tablet, Take 20 mg by mouth daily  sulfamethoxazole-trimethoprim (BACTRIM DS) 800-160 MG tablet, Take 1 tablet by mouth 2 times daily  thiamine (B-1) 100 MG tablet, Take 100 mg by mouth daily          ALLERGIES/SENSITIVITIES:   Allergies   Allergen Reactions     Naproxen Other (See Comments)     Passed out with aleve    Tolerates ibuprofen 6/2022       PHYSICAL EXAM:     BP (!) 152/94   Pulse 114   Temp 99.7  F (37.6  C) (Tympanic)   Resp 30   Ht 1.778 m (5' 10\")   Wt 94.4 kg (208 lb 1.8 oz)   SpO2 97%   BMI 29.86 kg/m      General Appearance:   Intubated and sedated  Buttocks.  Soiled, there is dark near black eschar of the right gluteal margin of the gluteal cleft.  There is a midline ulcer that has " fibrinous exudate and appears to be granulating.  Scalpel was used to debride nonviable tissue of an area of its 5 cm x 1.5 cm down to subcutaneous tissues.  A portion of this is grade 2.  The other portion is grade 3.  The superior midline 1 is not scheduled due to granulation tissue.  Likely it was grade 2-3 prior to see healing by secondary intention      ADDITIONAL COMMENTS:      CONSULTATION ASSESSMENT AND PLAN:    72 year old male with pressure ulcers after prolonged ICU stay.  Continue pressure offloading.  Bacitracin openings at the time of dressing changes daily.  If this deepens we can do wet-to-dry dressings with Kerlix packing.  Would like to avoid that for now to avoid increasing the pressure to his coccyx and sacral regions.    There does not appear to be any associated infection.  There is no gross purulence    Benjamin Luevano MD on 7/11/2022 at 3:46 PM

## 2022-07-11 NOTE — PROGRESS NOTES
Clinical Nutrition /Reassessment     **Recommendations for tube feeding: Jevity 1.5 at 71 ml/hour, continuous. This will provide daily: 1704 ml formula, 2556 kcal, 108 g protein, 1295 ml fluids. Flushes of 200 ml tap water every 4 hours would provide an additional 1200 ml fluids (IF OFF IV FLUIDS).   Currently running at 30 ml/hour, recommend to titrate up by 10 ml/hour every hour until goal is reached and is tolerated.     Assessment:   Client History:  Pt intubated again on 7/5. TPN stopped 7/9 related to metabolic acidosis. Started on tube feedings 7/10, OG tube in place. Not receiving propofol at this time. Stage III pressure area on bottom.         Diet Order: NPO  Weight:   Vitals:    06/17/22 1613 06/18/22 0243 06/21/22 0409 06/22/22 0600   Weight: 96.2 kg (212 lb) 90.2 kg (198 lb 12.8 oz) 90.7 kg (199 lb 15.3 oz) 95.7 kg (210 lb 15.7 oz)    06/24/22 0348 06/25/22 0418 06/26/22 0557 06/27/22 0400   Weight: 100.5 kg (221 lb 9 oz) 99 kg (218 lb 4.1 oz) 96.8 kg (213 lb 6.5 oz) 95.5 kg (210 lb 8.6 oz)    06/27/22 1111 06/28/22 0515 06/29/22 0200 06/29/22 2346   Weight: 93.3 kg (205 lb 11 oz) 92.4 kg (203 lb 11.3 oz) 91.5 kg (201 lb 11.5 oz) 93.3 kg (205 lb 11 oz)    07/01/22 0100 07/02/22 0108 07/04/22 0523 07/05/22 0644   Weight: 92.5 kg (204 lb) 86.3 kg (190 lb 4.1 oz) 85.3 kg (188 lb 0.8 oz) 86.2 kg (190 lb 0.6 oz)    07/05/22 2330 07/07/22 0330   Weight: 90.9 kg (200 lb 6.4 oz) 94.4 kg (208 lb 1.8 oz)     Estimated nutrition needs using current wt: 91 kg (200#):   0673-1314 kcal (25-30 kcal/kg)  109-137 g protein (1.2-1.5 g/kg)  5969-5050 ml fluids (1 ml/kcal)    Malnutrition Criteria:  (Need to have 2 indicators to qualify recommendation)  Energy Intake:  Acute Severe: </= 50% of estimated energy requirement for >/= 5 days  Interpretation of Weight Loss:  No significant weight loss in past year noted per chart review and lack of information in the time frame  Physical Findings:  No significant  findings  Reduced  Strength:  noted weakness, likely reduced with current illness    Recommended Nutrition Diagnosis:   Severe Malnutrition in the context of acute illness or injury - based on AND/[facility] Clinical Characterstics of Malnutrition May 2012      Nutrition Education: Nutrition education will be provided as appropriate.    Nutrition Diagnosis: Oral or Nutrition Support Intake:  inadequate energy intakes related to refusal to consume oral nutrition/NPO status with intubation as evidenced by minimal/no intakes x past 6+ days and prior to that, poor intakes    Intervention:  Nutrition Prescription:     Nutrition Intervention(s): NPO now that he is intubated. On enteral feeding, see above for recommendations  1. Jevity 1.5 at 71 ml/hour, continuous with 200 ml water flushes every 4 hours.      Nutrition Goal(s):  1. Pt will consume 50% or more at meals- on hold, NPO  2. Pt will not have unplanned wt loss during hospitalization - on going  3. Pt will tolerate diet as ordered- on hold, NPO  4. Pt will tolerate tube feeding at goal rate   5. Pt wound will improve     Monitoring and Evaluation:   Enteral feeding tolerance, diet advancement     Discharge Recommendation:   Nutrition Discharge Planning  Will address closer to discharge     RD will reassess in within 1-5 days or sooner.  Leidy Joyce RD on 7/11/2022 at 1:55 PM

## 2022-07-11 NOTE — PLAN OF CARE
Problem: Plan of Care - These are the overarching goals to be used throughout the patient stay.    Goal: Readiness for Transition of Care  Outcome: Ongoing, Not Progressing  Flowsheets (Taken 7/11/2022 1253)  Anticipated Changes Related to Illness: inability to care for self  Intervention: Mutually Develop Transition Plan  Recent Flowsheet Documentation  Taken 7/11/2022 1253 by Licha Galvez RN  Anticipated Changes Related to Illness: inability to care for self     Problem: Plan of Care - These are the overarching goals to be used throughout the patient stay.    Goal: Optimal Comfort and Wellbeing  Outcome: Ongoing, Progressing     Problem: Fall Injury Risk  Goal: Absence of Fall and Fall-Related Injury  Outcome: Ongoing, Progressing  Intervention: Identify and Manage Contributors  Recent Flowsheet Documentation  Taken 7/11/2022 1200 by Licha Galvez RN  Medication Review/Management: medications reviewed  Taken 7/11/2022 0700 by Licha Galvez RN  Medication Review/Management: medications reviewed  Intervention: Promote Injury-Free Environment  Recent Flowsheet Documentation  Taken 7/11/2022 1200 by Licha Galvez RN  Safety Promotion/Fall Prevention: safety round/check completed  Taken 7/11/2022 0700 by Licha Galvez RN  Safety Promotion/Fall Prevention: safety round/check completed     Problem: Restraint, Nonbehavioral (Nonviolent)  Goal: Absence of Harm or Injury  Outcome: Ongoing, Progressing  Intervention: Implement Least Restrictive Safety Strategies  Recent Flowsheet Documentation  Taken 7/11/2022 1200 by Licha Galvez RN  Medical Device Protection: tubing secured  Taken 7/11/2022 0700 by Licha Galvez RN  Medical Device Protection: tubing secured  Intervention: Protect Skin and Joint Integrity  Recent Flowsheet Documentation  Taken 7/11/2022 1200 by Licha Galvez RN  Body Position: turned  Taken 7/11/2022 0900 by Licha Galvez  RN  Body Position: turned

## 2022-07-11 NOTE — PROGRESS NOTES
Tele-ICU progress note  DOS: 7/11/2022, 7:21 AM      Patient chart reviewed. PS trials yesterday went well, unable to extubate because of mental status/inability to protect airway.    Temp:  [98.3  F (36.8  C)-99.9  F (37.7  C)] 98.3  F (36.8  C)  Pulse:  [] 111  Resp:  [16-29] 28  BP: ()/() 152/94  MAP:  [93 mmHg-126 mmHg] 120 mmHg  Arterial Line BP: (155-199)/(61-86) 184/83  FiO2 (%):  [25 %-30 %] 25 %  SpO2:  [87 %-100 %] 94 %     Vent Mode: CMV/AC  (Continuous Mandatory Ventilation/ Assist Control)  FiO2 (%): 25 %  Resp Rate (Set): 22 breaths/min  Tidal Volume (Set, mL): 500 mL  PEEP (cm H2O): 5 cmH2O  Pressure Support (cm H2O): 2 cmH2O  Inspiratory Pressure (cm H2O) (Drager Kristi): 7  Resp: 28    22/500/5/25, 7.47/35/65/25    NE gtt off     Labs reviewed:  - WBCs 14.7 (15.8), Hb 11.4  - Trop 2759 (2885)  - Cr 2.41 (2.24)  CXR personally reviewed, no consolidations/effusions to my read    Assessment/recommendations:  Mr Rutherford is a 71 y/o man initially admitted with EtOH withdrawal 6/17. He was found to have a pulmonary embolus and was started on heparin. He sustained cardiac arrest 7/5. He remains critically ill with acute hypoxemic respiratory failure requiring ventilator support, acute kidney injury, aspiration pneumonia.     CNS: Sedation held, following return of neuro function  # Toxic/metabolic encephalopathy  # EtOH withdrawal  # Acute pain  Pulm: 22/500/5/25  # Acute hypoxemic respiratory failure  # Pulmonary embolus  # Smoking history  - On fairly minimal ventilator support  - Continue pressure support trials  - Based on charting, mechanically looks good with RSBI in the 30s; likely will be able to extubate when mental status permits  - Continues on heparin gtt  CV: Off pressors.  # Hypotension/shock, resolved  # HTN  # Troponin level elevated  - Continues on heparin  GI: On TPN for nutritional support  : UOP adequate, good diuretic response  # [place]  - Supportive cares  Heme: Hb  stable at 11.4  # Anemia of critical illness/chronic disease  - No indications to transfuse  Endocrine: Glucose 117-140  ID: Afebrile, WBCs persistently elevated  # ?Aspiration pneumonia  - Continues on ceftriaxone, metronidazole    At the time of my review I was able to access to the patient s medical records/chart but not personally see or evaluate the patient. I cannot provide direct medical advice or consultation, but can provide a general opinion for consideration by the in-person treating provider. The contents of this note are not meant to replace or supersede the clinical judgement of the in-person treating provider.     Tele-hub will continue to follow. Please call with questions.    Morris Allen MD, PhD  Surgical critical care  7/11/2022, 7:42 AM

## 2022-07-12 ENCOUNTER — APPOINTMENT (OUTPATIENT)
Dept: GENERAL RADIOLOGY | Facility: OTHER | Age: 72
DRG: 673 | End: 2022-07-12
Attending: INTERNAL MEDICINE
Payer: MEDICARE

## 2022-07-12 VITALS
HEIGHT: 70 IN | BODY MASS INDEX: 29.67 KG/M2 | DIASTOLIC BLOOD PRESSURE: 87 MMHG | WEIGHT: 207.23 LBS | OXYGEN SATURATION: 99 % | RESPIRATION RATE: 13 BRPM | SYSTOLIC BLOOD PRESSURE: 133 MMHG | HEART RATE: 125 BPM | TEMPERATURE: 99.8 F

## 2022-07-12 LAB
ANION GAP SERPL CALCULATED.3IONS-SCNC: 10 MMOL/L (ref 3–14)
APTT PPP: 72 SECONDS (ref 22–38)
BASE EXCESS BLDA CALC-SCNC: 2.7 MMOL/L (ref -9–1.8)
BASE EXCESS BLDA CALC-SCNC: 3.1 MMOL/L (ref -9–1.8)
BUN SERPL-MCNC: 67 MG/DL (ref 7–25)
CALCIUM SERPL-MCNC: 8.2 MG/DL (ref 8.6–10.3)
CHLORIDE BLD-SCNC: 112 MMOL/L (ref 98–107)
CO2 SERPL-SCNC: 26 MMOL/L (ref 21–31)
CREAT SERPL-MCNC: 2.49 MG/DL (ref 0.7–1.3)
ERYTHROCYTE [DISTWIDTH] IN BLOOD BY AUTOMATED COUNT: 15 % (ref 10–15)
GFR SERPL CREATININE-BSD FRML MDRD: 26 ML/MIN/1.73M2
GLUCOSE BLD-MCNC: 193 MG/DL (ref 70–105)
GLUCOSE BLDC GLUCOMTR-MCNC: 139 MG/DL (ref 70–99)
GLUCOSE BLDC GLUCOMTR-MCNC: 184 MG/DL (ref 70–99)
HCO3 BLD-SCNC: 27 MMOL/L (ref 21–28)
HCO3 BLD-SCNC: 27 MMOL/L (ref 21–28)
HCT VFR BLD AUTO: 29.8 % (ref 40–53)
HGB BLD-MCNC: 9.9 G/DL (ref 13.3–17.7)
LACTATE SERPL-SCNC: 1 MMOL/L (ref 0.7–2)
MCH RBC QN AUTO: 33.8 PG (ref 26.5–33)
MCHC RBC AUTO-ENTMCNC: 33.2 G/DL (ref 31.5–36.5)
MCV RBC AUTO: 102 FL (ref 78–100)
O2/TOTAL GAS SETTING VFR VENT: 25 %
O2/TOTAL GAS SETTING VFR VENT: 25 %
PCO2 BLD: 38 MM HG (ref 35–45)
PCO2 BLD: 40 MM HG (ref 35–45)
PH BLD: 7.44 [PH] (ref 7.35–7.45)
PH BLD: 7.46 [PH] (ref 7.35–7.45)
PLATELET # BLD AUTO: 189 10E3/UL (ref 150–450)
PO2 BLD: 66 MM HG (ref 80–105)
PO2 BLD: 66 MM HG (ref 80–105)
POTASSIUM BLD-SCNC: 4.3 MMOL/L (ref 3.5–5.1)
RBC # BLD AUTO: 2.93 10E6/UL (ref 4.4–5.9)
SODIUM SERPL-SCNC: 148 MMOL/L (ref 134–144)
WBC # BLD AUTO: 9.7 10E3/UL (ref 4–11)

## 2022-07-12 PROCEDURE — 250N000011 HC RX IP 250 OP 636: Performed by: FAMILY MEDICINE

## 2022-07-12 PROCEDURE — 250N000011 HC RX IP 250 OP 636: Performed by: INTERNAL MEDICINE

## 2022-07-12 PROCEDURE — 94640 AIRWAY INHALATION TREATMENT: CPT

## 2022-07-12 PROCEDURE — 250N000009 HC RX 250: Performed by: FAMILY MEDICINE

## 2022-07-12 PROCEDURE — 85730 THROMBOPLASTIN TIME PARTIAL: CPT | Performed by: INTERNAL MEDICINE

## 2022-07-12 PROCEDURE — 82310 ASSAY OF CALCIUM: CPT | Performed by: FAMILY MEDICINE

## 2022-07-12 PROCEDURE — 82803 BLOOD GASES ANY COMBINATION: CPT | Performed by: INTERNAL MEDICINE

## 2022-07-12 PROCEDURE — 99239 HOSP IP/OBS DSCHRG MGMT >30: CPT | Performed by: FAMILY MEDICINE

## 2022-07-12 PROCEDURE — 999N000157 HC STATISTIC RCP TIME EA 10 MIN

## 2022-07-12 PROCEDURE — 999N000253 HC STATISTIC WEANING TRIALS

## 2022-07-12 PROCEDURE — 94003 VENT MGMT INPAT SUBQ DAY: CPT

## 2022-07-12 PROCEDURE — 94640 AIRWAY INHALATION TREATMENT: CPT | Mod: 76

## 2022-07-12 PROCEDURE — 36600 WITHDRAWAL OF ARTERIAL BLOOD: CPT | Performed by: INTERNAL MEDICINE

## 2022-07-12 PROCEDURE — 85014 HEMATOCRIT: CPT | Performed by: FAMILY MEDICINE

## 2022-07-12 PROCEDURE — 36415 COLL VENOUS BLD VENIPUNCTURE: CPT | Performed by: FAMILY MEDICINE

## 2022-07-12 PROCEDURE — 83605 ASSAY OF LACTIC ACID: CPT | Performed by: FAMILY MEDICINE

## 2022-07-12 PROCEDURE — 250N000009 HC RX 250: Performed by: INTERNAL MEDICINE

## 2022-07-12 PROCEDURE — 71045 X-RAY EXAM CHEST 1 VIEW: CPT | Mod: TC

## 2022-07-12 RX ADMIN — HEPARIN SODIUM 1450 UNITS/HR: 10000 INJECTION, SOLUTION INTRAVENOUS at 02:20

## 2022-07-12 RX ADMIN — Medication 50 MCG: at 03:30

## 2022-07-12 RX ADMIN — METRONIDAZOLE 500 MG: 500 INJECTION, SOLUTION INTRAVENOUS at 05:44

## 2022-07-12 RX ADMIN — IPRATROPIUM BROMIDE AND ALBUTEROL SULFATE 3 ML: 2.5; .5 SOLUTION RESPIRATORY (INHALATION) at 06:12

## 2022-07-12 RX ADMIN — METOPROLOL TARTRATE 5 MG: 5 INJECTION INTRAVENOUS at 10:03

## 2022-07-12 RX ADMIN — IPRATROPIUM BROMIDE AND ALBUTEROL SULFATE 3 ML: 2.5; .5 SOLUTION RESPIRATORY (INHALATION) at 10:09

## 2022-07-12 RX ADMIN — PROPOFOL 5 MCG/KG/MIN: 10 INJECTION, EMULSION INTRAVENOUS at 01:20

## 2022-07-12 RX ADMIN — HYDRALAZINE HYDROCHLORIDE 20 MG: 20 INJECTION INTRAMUSCULAR; INTRAVENOUS at 01:12

## 2022-07-12 RX ADMIN — Medication 100 MCG: at 01:24

## 2022-07-12 RX ADMIN — Medication 50 MCG: at 05:24

## 2022-07-12 ASSESSMENT — ACTIVITIES OF DAILY LIVING (ADL)
ADLS_ACUITY_SCORE: 50

## 2022-07-12 NOTE — PROGRESS NOTES
:     Patient will be transferring to [facility], [place] in [place].     Call placed to patients , Judith, to update her on discharge plan.     No more needs at this time.     REECE Rodriguez on 7/12/2022 at 9:09 AM

## 2022-07-12 NOTE — DISCHARGE SUMMARY
[facility]  Hospitalist Discharge Summary      Date of Admission:  6/17/2022  Date of Discharge:  7/12/2022  Discharging Provider: Erna Shelton DO  Discharge Service: Hospitalist Service    Discharge Diagnoses   CHARLOTTE, POA  ATN, POA  Alcohol dependence with withdrawal, POA  Pulmonary embolism, POA  Metabolic encephalopathy, POA  Aspiration pneumonia, not POA  UTI, POA  Pressure injury of sacrum, stage 3, not POA  B/L Pressure injury of sacrum and buttocks, stage 2, POA  Pressure injury of left foot, stage 1, POA  Pressure ulcer of R heel, POA. Stage 1.   Aortic valve mass, POA    Follow-ups Needed After Discharge   Cardiac work up for troponin if appropriate  May need tracheostomy  MRI brain to r/o CVA or anoxic brain injury given minimal responsive state off sedation >48 hours  Has archer in place, central line, intubated. OG tube feeds      Unresulted Labs Ordered in the Past 30 Days of this Admission     No orders found from 5/18/2022 to 6/18/2022.      These results will be followed up by [facility], Dr. Zavala    Discharge Disposition   Transferred to [place], [place]  Condition at discharge: Poor but stable.     Hospital Course        Summary of care     -Admitted 6/17 with alcohol withdrawal, acute kidney injury. -Encephalopathy worsened, agitation worsened and transferred to ICU for closer monitoring on 6/19.  - 6/21 worsening respiratory status, agitation. CT chest showed pulmonary embolism. Intubated due to mental status/somnolence.  -Extubated 6/24  -Improved renal function, transitioned from lovenox to xarelto. Physical and occupational therapy consulted and moved to medical floor. Echo showed arotic valve echodensity that will need ELLEN in future. Blood cultures negative. Slow daily improvement in mobility and mental status through July 4.   -More somnolent on 7/5, and became unresponsive. Code Blue called, CPR initiated. ROSC at 5 minutes. Intubated and  transferred to ICU. Possible aspiration pneumonia as cause.   -Troponin on 7/7 was 327, increased to 2000, now decrased. Echo shows no wall motion abnormalities. Unclear of this was due to CPR, or was an event to cause the 7/5 code blue.   -7/9 persistent nonanion gap metabolic acidosis. Lactate normal, BG controlled. Doubt perfusion, ischemia, DKA. Suspect TPN related.  -7/10 ABG improved. Metabolic acidosis resolved. Spontaneously breathing but not awake enough to protect airway at this time. Creatinine increased, with troponin increase.      Acute respiratory failure, multifactorial.  Likely due to alcohol withdrawal and need for sedation initially, then pulmonary embolism, then aspiration likely causing cardiac arrest and CODE BLUE.  We have been working to wean him off the vent.  He has been off sedation greater than 48 hours.  He is unable to open his eyes but will try to make purposeful movement and follows voice.  Plan is to transfer to higher level of care at this time for ongoing management.  He will go by ground ambulance as he has been stable from a respiratory standpoint for many days.    Elevated cardiac enzymes.  Unclear etiology.  Could be related to need for CPR on CODE BLUE, however they continue to increase.  He does have decompensated renal function.  Initially renal function was felt likely to be due to dehydration poor p.o. intake urinary tract infection and ATN from ibuprofen and Bactrim.  This improved minimally with IV fluids but has been tenuous given his ongoing hospitalization.  Likely worsen with need for IV antibiotic therapy.  Continue to work to avoid nephrotoxins as able.  He has had several echocardiograms.  No new wall motion abnormalities.  Of significance, there was a calcified area seen on the aortic valve not felt to be infectious.  Blood cultures have been negative.    Ulcers of the mouth and soft palate.  Likely developed due to prolonged hospitalization and intubation.  May  need to consider tracheostomy at this time given inability to extubate.     Aspiration pneumonia   Assessment: not present on admission   Plan: stop zosyn due to acute kidney injury, stopped vancomycin after 48 hrs. Completed course of therapy with rocephin and flagyl. Per discussion with family, prior history of what sounds like achalasia, requiring dilatation of the esophagus.  Given his prolonged hospital stay, weakness and general poor nutrition suspect he will likely have ongoing issues with swallowing.  May require future dilatation of esophagus.  Will likely need to have G-tube feeds if continues to need intubation and/or tracheostomy.     Metabolic encephalopathy, multifactorial.  As noted above, several CT imaging has been done.  No acute changes.  MRI was done was patient was initially extubated with no acute CVA or TIA or other ischemic or anoxic type processes.  Unable to repeat MRI logistically at our facility on intubated patient so will require MRI at accepting hospital.       Tobacco dependence       Alcohol withdrawal (H)    Assessment: it has been nearly a month since last drink        ATN (acute tubular necrosis) (H), present on admission.  Likely due to Bactrim and ibuprofen and poor p.o. intake.  Had improvement and resolution initially with IV fluids and treatment of infection.  Continues to have ongoing kidney injury likely due to medications and critical status.  Continues to have good urine output.       Pulmonary embolism (H)    Assessment: diagnosed on 6/21 and anticoagulated since then, initially on lovenox, then xarelto then heparin due to renal function.        Pressure injury of buttock, stage 2 on admit, now stage 3 sacral ulcer.     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    Assessment: noted on echo. blood culture negative 6/22. 6/27, 7/5. Anticoagulated.  Will need ELLEN in future     Severe protein calorie  malutrition  Assessment: started TPN, lipids on 7/6. Given metabolic acidosis, stopped on 7/9. started tube feeds by OG on 7/10/22     Nonanion gap metabolic acidosis  Assessment: persistent over 72 hrs. Lactate normal, BG controlled. Doubt perfusion, ischemia, DKA. Suspect TPN related. Resolved with bicarb drip.       Diet: Minced & Moist Diet (level 5) Thin Liquids (level 0)  Adult Formula Drip Feeding: Continuous Jevity 1.5; Orogastric tube; Goal Rate: 30; mL/hr; Medication - Feeding Tube Flush Frequency: At least 15-30 mL water before and after medication administration and with tube clogging    DVT Prophylaxis: Heparin SQ  Escobedo Catheter: PRESENT, indication: Strict 1-2 Hour I&O, Strict 1-2 Hour I&O  Central Lines: PRESENT  CVC TRIPLE LUMEN Left Internal jugular Power injectable-Site Assessment: WDL  Cardiac Monitoring: None  Code Status: Full Code        Consultations This Hospital Stay   PHYSICAL THERAPY ADULT IP CONSULT  OCCUPATIONAL THERAPY ADULT IP CONSULT  PHYSICAL THERAPY ADULT IP CONSULT  OCCUPATIONAL THERAPY ADULT IP CONSULT  SPEECH LANGUAGE PATH ADULT IP CONSULT  CARE MANAGEMENT / SOCIAL WORK IP CONSULT  NUTRITION SERVICES ADULT IP CONSULT  SOCIAL WORK IP CONSULT  PHARMACY TO DOSE VANCO  NUTRITION SERVICES ADULT IP CONSULT  PHARMACY IP CONSULT  PHARMACY IP CONSULT  PHARMACY IP CONSULT  PHARMACY IP CONSULT  NUTRITION SERVICES ADULT IP CONSULT    Code Status   Full Code    Time Spent on this Encounter   IErna DO, personally saw the patient today and spent greater than 30 minutes discharging this patient.       Erna Shelton DO  [facility]  [address] [facility] [address]  Phone: [phone]  Fax: [phone]  ______________________________________________________________________    Physical Exam   Vital Signs: Temp: 99.8  F (37.7  C) Temp src: Temporal BP: 122/74 Pulse: 102   Resp: 14 SpO2: 95 % O2 Device: Mechanical Ventilator    Weight: 207 lbs  3.72 oz    GENERAL: Off sedation, on ventilator. general edema/anasarca with weight loss/deconditioning, frail, acutely and chronically ill appearing.  HEENT: Anicteric, non-injected sclera, mouth moist. Ulceration of R side of mouth and soft palate  NECK: No JVD.  CARDIOVASCULAR: regular rate and rhythm, no murmur. Edema of hands/feet 1+ pitting  RESPIRATORY: Clear to auscultation bilaterally, no wheezes, no crackles.  GI: Non-distended, normal bowel sounds, soft, non-tender.  SKIN: Stage 3 coccyx ulcer and right buttock ulcer.   NEUROLOGY: Pupils react, cannot open eyes to command, gag present. Rotates head toward voice but cannot open eyes or squeeze fingers on command.        Primary Care Physician   Brian Rivero    Discharge Orders   No discharge procedures on file.    Significant Results and Procedures   Most Recent 3 CBC's:  Recent Labs   Lab Test 07/12/22  0544 07/11/22  0555 07/10/22  0524   WBC 9.7 14.7* 15.8*   HGB 9.9* 11.4* 10.3*   * 99 101*    227 205     Most Recent 3 BMP's:  Recent Labs   Lab Test 07/12/22  0731 07/12/22  0544 07/12/22  0149 07/11/22  0715 07/11/22  0555 07/10/22  2206 07/10/22  1830   NA  --  148*  --   --  143  --  140   POTASSIUM  --  4.3  --   --  4.2  --  4.9   CHLORIDE  --  112*  --   --  107  --  105   CO2  --  26  --   --  24  --  22   BUN  --  67*  --   --  65*  --  64*   CR  --  2.49*  --   --  2.55*  --  2.41*   ANIONGAP  --  10  --   --  12  --  13   PAWAN  --  8.2*  --   --  8.6  --  8.4*   * 193* 139*   < > 116*   < > 140*    < > = values in this interval not displayed.     Most Recent 3 INR's:  Recent Labs   Lab Test 07/11/22  0555 07/07/22  0531 06/28/22  0620   INR 1.06 1.24* 1.13     Most Recent 3 Hemoglobins:  Recent Labs   Lab Test 07/12/22  0544 07/11/22  0555 07/10/22  0524   HGB 9.9* 11.4* 10.3*     Most Recent 3 Troponin's:No lab results found.  Most Recent 3 BNP's:No lab results found.  Most Recent D-dimer:  Recent Labs   Lab Test  06/21/22  0720   DD >=20.00*     Most Recent 6 Bacteria Isolates From Any Culture (See EPIC Reports for Culture Details):No lab results found.  Most Recent TSH and T4:No lab results found.  Most Recent Hemoglobin A1c:No lab results found.  Most Recent 6 glucoses:  Recent Labs   Lab Test 07/12/22  0731 07/12/22  0544 07/12/22  0149 07/11/22  2158 07/11/22  1726 07/11/22  1323   * 193* 139* 157* 184* 198*   ,   Results for orders placed or performed during the hospital encounter of 06/17/22   XR Chest Port 1 View    Narrative    XR CHEST PORT 1 VIEW    HISTORY: 72 yearsMale weakness    TECHNIQUE: A single view of the chest was performed    COMPARISON: None    FINDINGS: Heart size and pulmonary vascularity are within normal  limits. Lungs are clear. No consolidating airspace opacities are  present.        Impression    IMPRESSION: Clear chest    ASH FOX MD         SYSTEM ID:  [id number]   CT Abdomen Pelvis w/o Contrast    Narrative    Exam:CT ABDOMEN PELVIS W/O CONTRAST    History:  72 years Male with flank pain.      Technique: Axial CT imaging of the abdomen and pelvis was performed  without contrast. Coronal and sagittal reconstructions were obtained      Findings:      Lung bases:The lung bases are clear.        Kidneys: Multiple bilateral renal cysts are present. There is a  small calculus in the hilum of the left kidney, possibly vascular. No  renal calculi are otherwise seen. There is no hydronephrosis or  evidence of a ureteral calculus.       Abdomen: Evaluation is limited due to lack of intravenous contrast.  Unenhanced images of the liver spleen pancreas and adrenal glands are  unremarkable.  There is advanced diverticulosis of the colon. No inflammatory changes  are seen at this time. No abnormally distended or thickened loops of  bowel are present.  The appendix is unremarkable.       Pelvis:There is no mass or lymphadenopathy. No abnormal fluid  collections are present.                 Impression    Impression: Acute intra-abdominal findings. There is no evidence of a  ureteral calculus or hydronephrosis at this time.      ASH FOX MD         SYSTEM ID:  [id number]   CT Head w/o Contrast    Narrative    PROCEDURE INFORMATION:   Exam: CT Head Without Contrast   Exam date and time: 6/19/2022 2:15 PM   Age: 72 years old   Clinical indication: Altered mental status/memory loss and other: ETOH     TECHNIQUE:   Imaging protocol: Computed tomography of the head without contrast.   Radiation optimization: All CT scans at this facility use at least one of these   dose optimization techniques: automated exposure control; mA and/or kV   adjustment per patient size (includes targeted exams where dose is matched to   clinical indication); or iterative reconstruction.     COMPARISON:   No relevant prior studies available.     FINDINGS:   Brain:  Atrophy. No hemorrhage.  Periventricular white matter changes   consistent with chronic small vessel disease. No mass effect.   Cerebral ventricles: No ventriculomegaly.   Paranasal sinuses: Visualized sinuses are unremarkable. No fluid levels.   Mastoid air cells: Visualized mastoid air cells are well aerated.     Bones/joints: Unremarkable. No acute fracture.   Soft tissues: Unremarkable.       Impression    IMPRESSION:   No acute intracranial abnormality.     THIS DOCUMENT HAS BEEN ELECTRONICALLY SIGNED BY TRACY INIGUEZ MD   XR Chest Port 1 View    Narrative    PROCEDURE INFORMATION:   Exam: XR Chest   Exam date and time: 6/20/2022 2:56 AM   Age: 72 years old   Clinical indication: Wheezing; Additional info: Coarse lung sounds     TECHNIQUE:   Imaging protocol: Radiologic exam of the chest.   Views: 1 view.     COMPARISON:   CR XR CHEST PORT 1 VIEW 6/17/2022 5:08 PM     FINDINGS:   Lungs: No focal consolidation or pulmonary edema.   Pleural spaces: No pleural effusion. No pneumothorax.   Heart/Mediastinum:  Likely unchanged allowing for differences in  technique.    Bones/joints: No acute findings. No acute fracture.       Impression    IMPRESSION:   No evidence of acute cardiopulmonary disease.     THIS DOCUMENT HAS BEEN ELECTRONICALLY SIGNED BY MOMO CLAY MD   MR Brain w/o & w Contrast    Narrative    Exam: MR BRAIN W/O & W CONTRAST,    Exam reason: Mental status change, unknown cause    Technique:   Pre-contrast sagittal T1, axial T1, axial FLAIR, axial GRE, axial  diffusion, and axial T2 weighted images of the brain were obtained.   Post gadolinium axial images of the brain with sagittal and coronal  reformats and axial T1-weighted images of the brain obtained.      Meds/Contrast: Dotarem 18 mL    Comparison: 6/19/2022    Findings:     Parenchyma: No evidence of an acute infarct, prior infarct in a major  vascular territory, mass, abnormal enhancement or intraparenchymal  hemorrhage.       Mild diffuse cerebral volume loss. There are couple small foci of  T2/FLAIR hyperintense signal in the periventricular and deep white  matter, nonspecific but likely due to chronic microvascular ischemic  change. Probable remote lacunar infarcts in the basal ganglia  bilaterally. There is a single nonspecific punctate focus of  susceptibility artifact in the left frontal lobe.    No midline shift. The basilar cisterns are patent.    Major intracranial flow voids are preserved.    Extra-axial spaces: No extra-axial hemorrhage or fluid collection.     Ventricles: Normal.     Paranasal Sinuses: Scattered minimal mucosal thickening in the ethmoid  sinuses. The sinuses are otherwise essentially clear.   Mastoid air cells: Unremarkable.  Calvarium: Unremarkable.    Orbits: Normal.        Impression    Impression:  No acute intracranial abnormality. No acute infarct, intracranial  hemorrhage, or enhancing abnormality.    CLOTILDE DIOR MD         SYSTEM ID:  [id number]   CT Chest Pulmonary Embolism w Contrast    Narrative    Exam:  CT CHEST PULMONARY EMBOLISM W  CONTRAST    Exam reason:  PE suspected, low/intermediate prob, positive D-dimer,  hypoxia, tachycardia.     Technique:  Contrast enhanced helical CT pulmonary angiography was  performed. Sagittal and coronal reformats as well as coronal MIP  reformats were obtained.     Meds/Contrast: 83 ml isovue 370    Comparison:  6/20/2022, 6/17/2022    FINDINGS:    Technical quality: Diagnostic.    Cardiovascular:   -There are several segmental pulmonary emboli in the right lower lobe,  involving the majority of the right lower lobe. There are a couple of  subsegmental pulmonary emboli in the right upper lobe. There are a  couple of possible subsegmental pulmonary emboli in the left lower  lobe.  -No aortic or other cardiovascular abnormalities.  Lungs/Airways: There are foci of consolidation throughout much of the  left lower lobe. Breathing motion obscures fine details in the lungs.  Nidhi/Mediastinum: No adenopathy or mass.  Pleura: No pleural effusion or pneumothorax.  Chest Wall/Axilla: There is bilateral gynecomastia.    Visualized Upper Abdomen: There are multiple renal cysts which should  appear similar to the prior exam. No acute findings in the upper  abdomen.  Musculoskeletal: No acute osseous abnormality.      Impression    IMPRESSION:    Several segmental pulmonary emboli in the right lower lobe involving  the majority of the right lower lobe with a couple of subsegmental  pulmonary emboli in the right upper lobe and a couple of possible  subsegmental pulmonary in the left lower lobe.    Foci of consolidation in the left lower lobe compatible with an  infectious or inflammatory process such as pneumonia.    These results were discussed with Dr. Menjivar by Dr. Mcfadden on  6/21/2022 1:30 PM.     CLOTILDE MCFADDEN MD         SYSTEM ID:  [id number]   XR Chest Port 1 View    Narrative    PROCEDURE:  XR CHEST PORT 1 VIEW    HISTORY:  Endotracheal tube positioning.     COMPARISON:  6/20/2022    FINDINGS:   The cardiac  silhouette is normal in size. The pulmonary vasculature is  normal.  There is some abnormal increased density at the left lung  base consistent with atelectasis or pneumonia. No pleural effusion or  pneumothorax. There is an endotracheal tube is tip approximately 4 cm  above the chela. There is a nasogastric tube passed into the stomach.      Impression    IMPRESSION: Endotracheal tube with its tip approximately 4 cm above  the chela    DAT CEE MD         SYSTEM ID:  [id number]   XR Chest Port 1 View    Narrative    PROCEDURE:  XR CHEST PORT 1 VIEW    HISTORY:  line placement.     COMPARISON:  6/21/2022 at 1610 hours    FINDINGS:   The cardiac silhouette is normal in size. The pulmonary vasculature is  normal.  There is some abnormal increased density at the left lung  base consistent with atelectasis or pneumonia. Central venous  catheters been placed with its tip in the superior vena cava. No  complication of central line insertion is seen. There is a nasogastric  tube passing into the stomach. There is an endotracheal tube with its  tip 4 cm above the chela      Impression    IMPRESSION:  Central venous catheter with its tip in the superior vena  cava. No complication of central line insertion is seen      DAT CEE MD         SYSTEM ID:  [id number]   XR Chest Port 1 View    Narrative    PROCEDURE: XR CHEST PORT 1 VIEW 6/24/2022 1:59 PM    HISTORY: post extubation. recent pna.    COMPARISONS: 6/21/2022.    TECHNIQUE: Portable AP views.    FINDINGS: Heart is not enlarged. There is ectasia of the aorta. No  acute infiltrate or effusion is seen.    Central venous catheter catheter remains in place. Endotracheal tube  and nasogastric tube have been removed.         Impression    IMPRESSION: No acute infiltrate.    JAY JAY HART MD         SYSTEM ID:  [id number]   CT Head w/o contrast*    Narrative    Exam: CT HEAD W/O CONTRAST      Exam reason: ro bleed, encephalopathy    Technique:   -Axial  images of the head obtained without contrast. Coronal and  sagittal reformations were generated.    Comparison: 6/21/2022, 6/19/2022       Findings:      Parenchyma: No evidence of intraparenchymal hemorrhage, mass, acute  cortical infarct or prior infarct in a major vascular territory.      Mild diffuse cerebral volume loss. There is patchy periventricular and  deep white matter hypoattenuation which is nonspecific but likely due  to chronic microvascular ischemic change. 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    No midline shift. The basilar cisterns are patent.    Extra-axial spaces: No extra-axial fluid collection or hemorrhage.     Ventricles: Normal.  Paranasal sinuses: Clear.   Mastoid air cells: Clear.    Osseous: No acute findings.  Orbits: No acute findings.    Soft tissues: Unremarkable.       Impression    Impression:  No acute intracranial abnormality.      CLOTILDE DIOR MD         SYSTEM ID:  [id number]   XR Chest Port 1 View    Narrative    PROCEDURE:  XR CHEST PORT 1 VIEW    HISTORY: fever. .    COMPARISON:  None.    FINDINGS:    The cardiomediastinal contours are stable.  No focal consolidation, effusion or pneumothorax.      Impression    IMPRESSION:  No acute cardiopulmonary disease seen.      JEROD POWELL MD         SYSTEM ID:  [id number]   XR Chest Port 1 View    Narrative    PROCEDURE:  XR CHEST PORT 1 VIEW    HISTORY: post intubation. .    COMPARISON:  None.    FINDINGS:    The cardiomediastinal contours are stable.  No focal consolidation, effusion or pneumothorax.      Impression    IMPRESSION:   1. No acute cardiopulmonary disease seen.   2. Endotracheal tube in good position.     JEROD POWELL MD         SYSTEM ID:  [id number]   XR Chest Port 1 View    Narrative    PROCEDURE:  XR CHEST PORT 1 VIEW    HISTORY: for central line placement. .    COMPARISON:  CT chest 6/21/2022    FINDINGS:  A left central line terminates in projection with the mid mediastinum.  This may be  within the innominate vein.  The cardiomediastinal contours are stable.  Patchy airspace opacities are new when compared to the immediate  prior. No pneumothorax.      Impression    IMPRESSION:  No pneumothorax following central line placement.      SHARON FINLEY MD         SYSTEM ID:  [id number]   XR Chest Port 1 View    Narrative    XR CHEST PORT 1 VIEW    HISTORY: 72 yearsMale NG placement    TECHNIQUE: A single view of the chest was performed    COMPARISON: 7/6/2022    FINDINGS: An endotracheal tube is in place. The tip is well above the  chela. A left internal jugular central line is present. The tip is  unchanged.    A nasogastric tube is in place. The tube is coiled at the level of the  cardia of the stomach and the tip is directed rightward's. It is  possible the tip is at or above the gastroesophageal junction.        Impression    IMPRESSION: Endotracheal tube unchanged from the prior study.    Nasogastric tube position as described above. It is possible that tip  is at or above the gastroesophageal junction.    ASH FOX MD         SYSTEM ID:  [id number]   US Lower Extremity Venous Duplex Bilateral    Narrative    Exam:US LOWER EXTREMITY VENOUS DUPLEX BILATERAL    History:  72 years Male   : Shortness of breath, elevated troponin.    Comparisons:    Technique: Venous duplex ultrasonography of the bilateral lower  extremities was performed.     Findings: The common femoral veins, superficial femoral veins and  popliteal veins are fully compressible with spontaneous and  augmentable venous flow.           Impression    Impression: No evidence of deep venous thrombosis within the lower  extremities.    ASH FOX MD         SYSTEM ID:  [id number]   CT Head w/o contrast*    Narrative    PROCEDURE: CT HEAD W/O CONTRAST     HISTORY: acute mental status changes.    COMPARISON: June 24, 2022    TECHNIQUE:  Helical images of the head from the foramen magnum to the  vertex were obtained without  contrast.    FINDINGS: There is an old lacunar infarct seen in the head of the  caudate and the anterior limb of the internal capsule on the left.  There is an old lacunar infarct seen in the basal ganglia and anterior  limb of the internal capsule on the right. There are no masses or  ventricular shifts or extracerebral collections. The brainstem and  cerebellum appear normal. The cranial vault is intact. There is a  polyp or retention cyst seen in the right maxillary sinus.      Impression    IMPRESSION: No acute brain abnormality. Old lacunar infarcts seen in  both hemispheres      DAT CEE MD         SYSTEM ID:  [id number]   XR Chest Port 1 View    Narrative    PROCEDURE INFORMATION:   Exam: XR Chest   Exam date and time: 7/9/2022 7:06 AM   Age: 72 years old   Clinical indication: Device placement; Other: Og placement; Patient HX: Og tube   was pulled out slightly and had to be re-advanced. Placement check after   re-advancement.     TECHNIQUE:   Imaging protocol: Radiologic exam of the chest.   Views: 1 view.     COMPARISON:   CR XR CHEST PORT 1 VIEW 7/7/2022 11:51 AM     FINDINGS:   Tubes, catheters and devices: There is an endotracheal tube terminating about 7   cm above the chela in this projection. The enteric tube terminates distal to   the GE junction.     Lungs: Improving interstitial markings without dense consolidation.   Pleural spaces: Unremarkable. No pleural effusion. No pneumothorax.   Heart/Mediastinum: Stable cardiomediastinal silhouette. Bilateral paratracheal   thickening is likely vascular stable.   Bones/joints: Degenerative changes without acute fracture.       Impression    IMPRESSION:   Support tubes as above.  No focal airspace disease.    THIS DOCUMENT HAS BEEN ELECTRONICALLY SIGNED BY WOODROW NAVA MD   XR Chest Port 1 View    Narrative    PROCEDURE INFORMATION:   Exam: XR Chest   Exam date and time: 7/10/2022 8:06 AM   Age: 72 years old   Clinical indication: Device  placement; Other: Daily cxr check for intubation;   Additional info: Intubated     TECHNIQUE:   Imaging protocol: Radiologic exam of the chest.   Views: 1 view.     COMPARISON:   CR XR CHEST PORT 1 VIEW 2022 7:06 AM     FINDINGS:   Tubes, catheters and devices: ETT 6 cm above the chela. Apparent NG tube, not   clearly visualized below the thoracic inlet.     Lungs: Unremarkable. No consolidation.   Pleural spaces: Unremarkable. No pneumothorax.   Heart/Mediastinum: Unremarkable.    Bones/joints: Unremarkable.       Impression    IMPRESSION:   ETT 6 cm above the chela. Apparent NG tube, not clearly visualized below the   thoracic inlet.     THIS DOCUMENT HAS BEEN ELECTRONICALLY SIGNED BY GARRETT GONZALEZ MD   XR Chest Port 1 View    Narrative    PROCEDURE INFORMATION:   Exam: XR Chest   Exam date and time: 2022 5:41 AM   Age: 72 years old   Clinical indication: Device placement; Chest tube; Additional info: Intubated     TECHNIQUE:   Imaging protocol: Radiologic exam of the chest.   Views: 1 view.     COMPARISON:   CR XR CHEST PORT 1 VIEW 7/10/2022 8:06 AM     FINDINGS:    Lungs: Chronic appearing increased interstitial markings. Lungs otherwise   clear.    Pleural spaces: No definite pneumothorax.    Heart/Mediastinum: Cardiac silhouette and the pulmonary vasculature within   normal limits. Endotracheal tube, enteric tube, and left central line in stable   positions.    Bones/Joints: Osseous structures unchanged.       Impression    IMPRESSION:   No focal consolidation/pneumonia.     THIS DOCUMENT HAS BEEN ELECTRONICALLY SIGNED BY RACHANA OWEN MD   Echocardiogram Complete     Value    LVEF  55-60%    Narrative    [id number]  [id number]  [id number]  [id number]^NADYA^MEGHANA^T     [facility]  [address] [place] [address]     Name: ALEJANDRA MAIN  MRN: [id number]  : 1950  Study Date: 2022 01:39 PM  Age: 72 yrs  Gender: Male  Patient Location: [facility]  Reason For Study:  Pulmonary Embolism  Ordering Physician: MEGHANA COVINGTON  Performed By: Louise Glasgow RDCS, RVT     BSA: 2.1 m2  Height: 70 in  Weight: 199 lb  HR: 140  BP: 140/81 mmHg  ______________________________________________________________________________  Procedure  Complete Portable Echo Adult.  ______________________________________________________________________________  Interpretation Summary  Extremely limited study due to a-fib RVR and tachycardia.     Left ventricular size, wall motion and function are normal. The ejection  fraction is 55-60%.  Right ventricular function, chamber size, wall motion, and thickness are  normal.  There is trileaflet aortic stenosis. Unable to exclude a small, mobile  echodensity on the AV, ventricular side. Recommend further evaluation.  The inferior vena cava was normal in size with preserved respiratory  variability. No pericardial effusion is present.     There is no prior study for direct comparison. Suggest further evaluation of  the AV especially if infection is a consideration. Biventricular function  appears to be preserved yet study is very limited by severe tachycardia.  ______________________________________________________________________________  Left Ventricle  Left ventricular size, wall motion and function are normal. The ejection  fraction is 55-60%. Diastolic function not assessed due to atrial  fibrillation.     Right Ventricle  Right ventricular function, chamber size, wall motion, and thickness are  normal.     Atria  The atria cannot be assessed.     Mitral Valve  The mitral valve is normal.     Aortic Valve  The aortic valve is tricuspid. There is trileaflet aortic stenosis. Unable to  exclude a small, mobile echodensity on the AV, ventricular side. Recommend  further evaluation. No aortic regurgitation is present.     Tricuspid Valve  The tricuspid valve is normal.     Pulmonic Valve  The pulmonic valve cannot be assessed.     Vessels  The aorta root is  normal. The inferior vena cava was normal in size with  preserved respiratory variability.     Pericardium  No pericardial effusion is present.     Compared to Previous Study  There is no prior study for direct comparison.     ______________________________________________________________________________  MMode/2D Measurements & Calculations  IVSd: 1.4 cm  LVIDd: 3.9 cm  LVIDs: 2.2 cm  LVPWd: 1.0 cm  FS: 42.4 %     LV mass(C)d: 159.4 grams  LV mass(C)dI: 76.5 grams/m2  Ao root diam: 3.3 cm  asc Aorta Diam: 3.6 cm  LVOT diam: 2.2 cm  LVOT area: 3.8 cm2  LA Volume (BP): 41.3 ml  LA Volume Index (BP): 19.9 ml/m2  RWT: 0.54     Doppler Measurements & Calculations  MV E max margarito: 59.5 cm/sec  MV A max margarito: 87.9 cm/sec  MV E/A: 0.68     MV dec slope: 329.0 cm/sec2  MV dec time: 0.18 sec  Ao V2 max: 230.8 cm/sec  Ao max P.0 mmHg  Ao V2 mean: 167.3 cm/sec  Ao mean P.8 mmHg  Ao V2 VTI: 27.2 cm  EMELY(I,D): 2.1 cm2  EMELY(V,D): 1.8 cm2  LV V1 max P.0 mmHg  LV V1 max: 111.8 cm/sec  LV V1 VTI: 14.8 cm  SV(LVOT): 56.1 ml  SI(LVOT): 26.9 ml/m2  PA acc time: 0.06 sec  AV Margarito Ratio (DI): 0.48  EMELY Index (cm2/m2): 0.99  Lateral E/e': 6.4     ______________________________________________________________________________  Report approved by: Jasmyne BOOTHE 2022 02:47 PM         Echocardiogram Complete     Value    LVEF  55-60%    Narrative    [id number]  [id number]  [id number]  [id number]^EUGENIO^GORDO^DALLAS     [facility]  [address] [place] [address]     Name: ALEJANDRA MAIN  MRN: [id number]  : 1950  Study Date: 2022 09:15 AM  Age: 72 yrs  Gender: Male  Patient Location: [facility]  Reason For Study: Aortic Valve Disorder  Ordering Physician: GORDO BECKER  Performed By: Louise Glasgow RDCS, RVT     BSA: 2.1 m2  Height: 70 in  Weight: 210 lb  BP: 154/81 mmHg  ______________________________________________________________________________  Procedure  Complete Portable Echo Adult.  Contrast Definity. Definity (NDC #24850-034-76)  given intravenously. Patient was given 2ml mixture of 1.5ml Definity and 8.5ml  saline. 8 ml wasted. Definity Lot # [id number] .  ______________________________________________________________________________  Interpretation Summary  There is a mobile hyperechoic echodensity on the non-coronary cusp.  Echocardiographic appearance is most consistent with a mobile calcific nodule,  however vegetation cannot be excluded in the appropriate clinical context.  Global and regional left ventricular function is normal with an EF of 55-60%.  Right ventricular function, chamber size, wall motion, and thickness are  normal.  This study was compared with the study from 6/21/22 .  Sinus rhythm has replaced A.fib [facility] RVR.  ______________________________________________________________________________  Left Ventricle  Global and regional left ventricular function is normal with an EF of 55-60%.  Left ventricular size is normal. Relative wall thickness is increased  consistent with concentric remodeling. Left ventricular diastolic function is  indeterminate. Diastolic Doppler findings (E/E' ratio and/or other parameters)  suggest left ventricular filling pressures are normal.     Right Ventricle  Right ventricular function, chamber size, wall motion, and thickness are  normal.     Atria  Both atria appear normal.     Mitral Valve  Mild mitral annular calcification is present.     Aortic Valve  Trileaflet aortic sclerosis without stenosis. There is a mobile hyperechoic  echodensity on the non-coronary cusp. Echocardiographic appearance is most  consistent with a mobile calcific nodule, however vegetation cannot be  excluded in the appropriate clinical context.     Tricuspid Valve  Trace tricuspid insufficiency is present. The peak velocity of the tricuspid  regurgitant jet is not obtainable. Pulmonary artery systolic pressure cannot  be assessed.     Pulmonic Valve  The pulmonic valve is  normal.     Vessels  The aorta root is normal. The thoracic aorta is normal. The inferior vena cava  was normal in size with preserved respiratory variability. IVC diameter <2.1  cm collapsing >50% with sniff suggests a normal RA pressure of 3 mmHg.     Pericardium  No pericardial effusion is present.     Compared to Previous Study  This study was compared with the study from 22 . Sinus rhythm has  replaced A.fib wth RVR.     ______________________________________________________________________________  MMode/2D Measurements & Calculations  IVSd: 1.2 cm  LVIDd: 3.8 cm  LVIDs: 2.7 cm  LVPWd: 0.97 cm     FS: 29.7 %  LV mass(C)d: 129.1 grams  LV mass(C)dI: 60.6 grams/m2  Ao root diam: 3.2 cm  asc Aorta Diam: 3.4 cm  LVOT diam: 2.3 cm  LVOT area: 4.2 cm2  LA Volume (BP): 47.2 ml  LA Volume Index (BP): 22.2 ml/m2  RWT: 0.51     Doppler Measurements & Calculations  MV E max margarito: 55.7 cm/sec  MV A max margarito: 72.8 cm/sec  MV E/A: 0.77  MV dec slope: 205.0 cm/sec2  MV dec time: 0.27 sec  Ao V2 max: 195.0 cm/sec  Ao max PG: 15.0 mmHg  Ao V2 mean: 136.0 cm/sec  Ao mean P.0 mmHg  Ao V2 VTI: 34.1 cm  EMELY(I,D): 2.3 cm2  EMELY(V,D): 2.2 cm2  LV V1 max P.1 mmHg  LV V1 max: 101.0 cm/sec  LV V1 VTI: 19.2 cm  SV(LVOT): 79.8 ml  SI(LVOT): 37.4 ml/m2  PA acc time: 0.09 sec  AV Margarito Ratio (DI): 0.52  EMELY Index (cm2/m2): 1.1  E/E' av.7  Lateral E/e': 8.5  Medial E/e': 8.8     ______________________________________________________________________________  Report approved by: Jasmyne Funez 2022 10:49 AM         Echocardiogram Complete     Value    LVEF  60-65%    Narrative    [id number]  [id number]  [id number]  [id number]^NATALIA^DORA^[facility]  [address] [place] [place] [address]     Name: ALEJANDRA MAIN  MRN: [id number]  : 1950  Study Date: 2022 08:03 AM  Age: 72 yrs  Gender: Male  Patient Location: [facility]  Reason For Study: Cardiac Arrest  Ordering Physician:  DORA FISHER  Performed By: Louise Glasgow RDCS, RVT     BSA: 2.1 m2  Height: 70 in  Weight: 200 lb  HR: 76  BP: 117/67 mmHg  ______________________________________________________________________________  Procedure  Complete Portable Echo Adult.  ______________________________________________________________________________  Interpretation Summary  Global and regional left ventricular function is normal with an EF of 60-65%.  Right ventricular function, chamber size, wall motion, and thickness are  normal.  Calcified trileaflet aortic valve with mild aortic stenosis (peak velocity 2.4  m/s, mean gradient 22 mmHg.) Calcific nodule on the non-coronary cusp of the  aortic valve, unchanged from the prior study.  This study was compared with the study from 6/22/22: Mild aortic stenosis is  now present.  ______________________________________________________________________________  Left Ventricle  Left ventricular size is normal. Global and regional left ventricular function  is normal with an EF of 60-65%. Relative wall thickness is increased  consistent with concentric remodeling. Left ventricular diastolic function is  indeterminate.     Right Ventricle  Right ventricular function, chamber size, wall motion, and thickness are  normal.     Atria  Both atria appear normal.     Mitral Valve  Mild mitral annular calcification is present.     Aortic Valve  The aortic valve is tricuspid. Moderate aortic valve calcification is present.  Calcific nodule on the non-coronary cusp of the aortic valve, unchanged from  the prior study. Mild aortic stenosis is present. The peak aortic velocity is  2.4 m/sec. The mean gradient across the aortic valve is22 mmHg.     Tricuspid Valve  The tricuspid valve is normal. Trace tricuspid insufficiency is present. The  peak velocity of the tricuspid regurgitant jet is not obtainable. Pulmonary  artery systolic pressure cannot be assessed.     Pulmonic Valve  The pulmonic valve is  normal.     Vessels  The aorta root is normal. The inferior vena cava was normal in size with  preserved respiratory variability. The pulmonary artery cannot be assessed.  IVC diameter <2.1 cm collapsing >50% with sniff suggests a normal RA pressure  of 3 mmHg.     Pericardium  Prominent epicardial fat is noted. No pericardial effusion is present.     Compared to Previous Study  This study was compared with the study from 22 . Mild aortic stenosis is  now present.     ______________________________________________________________________________  MMode/2D Measurements & Calculations  IVSd: 1.0 cm  LVIDd: 4.4 cm  LVIDs: 2.7 cm  LVPWd: 0.99 cm  FS: 38.5 %     LV mass(C)d: 149.9 grams  LV mass(C)dI: 71.8 grams/m2  asc Aorta Diam: 3.3 cm  LVOT diam: 2.2 cm  LVOT area: 3.8 cm2  RWT: 0.45     Doppler Measurements & Calculations  MV E max margarito: 61.7 cm/sec  MV A max margarito: 63.8 cm/sec  MV E/A: 0.97  MV dec slope: 232.0 cm/sec2  MV dec time: 0.27 sec  Ao V2 max: 235.0 cm/sec  Ao max P.0 mmHg  Ao V2 mean: 156.0 cm/sec  Ao mean P.0 mmHg  Ao V2 VTI: 34.4 cm  EMELY(I,D): 2.3 cm2  EMELY(V,D): 1.7 cm2  LV V1 max P.4 mmHg  LV V1 max: 105.0 cm/sec  LV V1 VTI: 21.2 cm  SV(LVOT): 80.6 ml  SI(LVOT): 38.6 ml/m2  PA acc time: 0.09 sec  AV Margarito Ratio (DI): 0.45  EMELY Index (cm2/m2): 1.1     ______________________________________________________________________________  Report approved by: Jasmyne Funez 2022 10:13 AM         Echo Limited     Value    LVEF  60-65%    Narrative    [id number]  [id number]  [id number]  [id number]^EUGENIO^GORDO^DALLAS     [facility]  [address] [place] [address]     Name: ALEJANDRA MAIN  MRN: [id number]  : 1950  Study Date: 2022 02:05 PM  Age: 72 yrs  Gender: Male  Patient Location: [facility]  Reason For Study: Chest Pain  Ordering Physician: GORDO BECKER  Performed By: Louise Glasgow, VICKY, RVT     BSA: 2.1 m2  Height: 70 in  Weight: 208 lb  HR: 72  BP:  119/56 mmHg  ______________________________________________________________________________  Procedure  Limited Portable Echo Adult.  ______________________________________________________________________________  Interpretation Summary  Limited echo.  Global and regional left ventricular function is normal with an EF of 60-65%.  Global right ventricular function is normal.  No pericardial effusion is present.     No change in biventricular function compared with yesterday's echo.  ______________________________________________________________________________  Left Ventricle  Global and regional left ventricular function is normal with an EF of 60-65%.     Right Ventricle  The right ventricle is normal size. Global right ventricular function is  normal.     Mitral Valve  The mitral valve is normal.     Vessels  Unable to assess mean RA pressure given the patient is on a ventilator.     Pericardium  No pericardial effusion is present.     ______________________________________________________________________________  Doppler Measurements & Calculations  MV E max juan luis: 86.5 cm/sec  MV A max juan luis: 69.8 cm/sec  MV E/A: 1.2  MV dec slope: 510.0 cm/sec2  MV dec time: 0.17 sec  PA acc time: 0.10 sec     ______________________________________________________________________________  Report approved by: Jasmyne Deshpande 07/07/2022 03:39 PM               Discharge Medications   Current Discharge Medication List      CONTINUE these medications which have NOT CHANGED    Details   dicyclomine (BENTYL) 20 MG tablet Take 20 mg by mouth 2 times daily as needed      lisinopril (ZESTRIL) 20 MG tablet Take 20 mg by mouth daily      omeprazole 20 MG tablet Take 20 mg by mouth daily      thiamine (B-1) 100 MG tablet Take 100 mg by mouth daily         STOP taking these medications       ibuprofen (ADVIL/MOTRIN) 400 MG tablet Comments:   Reason for Stopping:         sulfamethoxazole-trimethoprim (BACTRIM DS) 800-160 MG tablet  Comments:   Reason for Stopping:             Allergies   Allergies   Allergen Reactions     Naproxen Other (See Comments)     Passed out with aleve    Tolerates ibuprofen 6/2022

## 2022-07-12 NOTE — PROGRESS NOTES
Pt on full vent support this evening. Pt breathing slightly over the vent. Pt appears to be resting comfortably at this time.

## 2022-07-12 NOTE — PLAN OF CARE
"BP 90/60   Pulse 93   Temp 98.8  F (37.1  C) (Temporal)   Resp 21   Ht 1.778 m (5' 10\")   Wt 94 kg (207 lb 3.7 oz)   SpO2 92%   BMI 29.73 kg/m      Pt is tachycardic and hypertensive.  Tolerating vent settings with intermittent coughing.  Pt opens eyes spontaneously and able to follow commands, no verbal response.  Hand grasp weak, able to move feet.  Pt grimacing and tense extremities.  Low dose propofol restarted, low dose fentanyl restarted,  adequate for relief.  Carolina Menjivar RN.............................7/12/2022 5:05 AM    "

## 2022-07-12 NOTE — PHARMACY - DISCHARGE MEDICATION RECONCILIATION AND EDUCATION
Pharmacy:  Discharge Counseling and Medication Reconciliation    Antonio Rutherford  [address]  [facility] [address]  [phone] (home)   72 year old male  PCP: Brian Rivero    Allergies: Naproxen    Discharge Counseling:    Pharmacist did not meet with patient prior to discharge, as patient is being discharged to a higher level of care.    Summary of Education: N/A    Materials Provided:  MedCounselor sheets printed from Clinical Pharmacology on: N/A    Discharge Medication Reconciliation:    Final discharge list, availability of medications to be determined by accepting hospital, when patient ultimately discharges home/to prior living arrangement.    Thank you for the consult.    Kuldeep Hanson [facility]........July 12, 2022 9:07 AM

## 2022-07-12 NOTE — PROGRESS NOTES
"Discussed care plan with primary service.  In summary 72-year-old gentleman with acute kidney injury altered mental status who has been intubated for respiratory failure.  His respiratory failure is improving at this time and he is tolerating spontaneous breathing trials.  However his mental status waxes and wanes and prevents him from being extubated given his inability to consistently protect his airway.  Patient does need an MRI for his altered mental status.  My recommendations would be given patient's altered mental status to leave the patient intubated for imaging and then reassess the patient for extubation after MRI.  It is also of note patient has ulcerations in his mouth and may benefit from a tracheostomy.  I agree with the plan to continue family conferences regarding goals of care.  /73   Pulse 97   Temp 98.8  F (37.1  C) (Temporal)   Resp 11   Ht 1.778 m (5' 10\")   Wt 94 kg (207 lb 3.7 oz)   SpO2 95%   BMI 29.73 kg/m    Vent Mode: CMV/AC  (Continuous Mandatory Ventilation/ Assist Control)  FiO2 (%): 25 %  Resp Rate (Set): 22 breaths/min  Tidal Volume (Set, mL): 500 mL  PEEP (cm H2O): 5 cmH2O  Pressure Support (cm H2O): 5 cmH2O  Resp: 11    Recommendations  Remain for MRI  Spontaneous breathing trial after MRI  Reevaluate for possible extubation  Discussed with family goals of care.  Continue to monitor for acute kidney injury, creatinine seems to be plateauing.  Continue heparin for pulmonary embolus.    Morris Eldridge MD  Critical Care Medicine  "

## 2022-07-12 NOTE — PROGRESS NOTES
Patient remains on Vent, current settings are Vt 500, RR 22, PEEP 5, FiO2 titrated to maintain sats greater that 90%.   ETT remains secured at 24 at the lips.  ETT moved Q2 to prevent pressure sores.  Suctioned thick inna yellow secretions from ETT.  Scheduled nebs given as ordered.

## 2022-07-14 ENCOUNTER — PATIENT OUTREACH (OUTPATIENT)
Dept: CARE COORDINATION | Facility: CLINIC | Age: 72
End: 2022-07-14

## 2022-07-14 NOTE — PROGRESS NOTES
Clinic Care Coordination Contact  Chart reviewed 7/13/22 for TCM review, late entry.  Patient transferred to higher level of care. No TCM call required per policy.   Maddie.  Sada Scruggs RN ,....................  7/14/2022   8:10 AM

## 2022-09-01 ENCOUNTER — TELEPHONE (OUTPATIENT)
Dept: FAMILY MEDICINE | Facility: OTHER | Age: 72
End: 2022-09-01

## 2022-09-01 NOTE — TELEPHONE ENCOUNTER
Any one available for work in?Patient needs a hospital follow up and change of wound dressing per nurse at hospital. They will not discharge until they hear from us with an appointment. They would prefer 9/6/22 , but will take anything  next week. PBI is not available . Contact Viviana at [phone] today please as soon as possible.     Randee Palmer on 9/1/2022 at 10:56 AM
